# Patient Record
Sex: FEMALE | Race: WHITE | NOT HISPANIC OR LATINO | Employment: OTHER | URBAN - METROPOLITAN AREA
[De-identification: names, ages, dates, MRNs, and addresses within clinical notes are randomized per-mention and may not be internally consistent; named-entity substitution may affect disease eponyms.]

---

## 2023-03-22 ENCOUNTER — DOCUMENTATION ONLY (OUTPATIENT)
Dept: OTHER | Facility: CLINIC | Age: 73
End: 2023-03-22

## 2023-04-02 ENCOUNTER — LAB REQUISITION (OUTPATIENT)
Dept: LAB | Facility: CLINIC | Age: 73
End: 2023-04-02

## 2023-04-02 DIAGNOSIS — A15.0 TUBERCULOSIS OF LUNG: ICD-10-CM

## 2023-04-02 DIAGNOSIS — R76.12 NONSPECIFIC REACTION TO CELL MEDIATED IMMUNITY MEASUREMENT OF GAMMA INTERFERON ANTIGEN RESPONSE WITHOUT ACTIVE TUBERCULOSIS: ICD-10-CM

## 2023-04-03 PROCEDURE — 86481 TB AG RESPONSE T-CELL SUSP: CPT | Performed by: FAMILY MEDICINE

## 2023-04-04 ENCOUNTER — LAB REQUISITION (OUTPATIENT)
Dept: LAB | Facility: CLINIC | Age: 73
End: 2023-04-04

## 2023-04-04 DIAGNOSIS — I50.9 HEART FAILURE, UNSPECIFIED (H): ICD-10-CM

## 2023-04-04 LAB
GAMMA INTERFERON BACKGROUND BLD IA-ACNC: 0.03 IU/ML
M TB IFN-G BLD-IMP: NEGATIVE
M TB IFN-G CD4+ BCKGRND COR BLD-ACNC: 0.82 IU/ML
MITOGEN IGNF BCKGRD COR BLD-ACNC: 0.01 IU/ML
MITOGEN IGNF BCKGRD COR BLD-ACNC: 0.02 IU/ML
QUANTIFERON MITOGEN: 0.85 IU/ML
QUANTIFERON NIL TUBE: 0.03 IU/ML
QUANTIFERON TB1 TUBE: 0.04 IU/ML
QUANTIFERON TB2 TUBE: 0.05

## 2023-04-05 LAB
ANION GAP SERPL CALCULATED.3IONS-SCNC: 16 MMOL/L (ref 7–15)
BUN SERPL-MCNC: 26.4 MG/DL (ref 8–23)
CALCIUM SERPL-MCNC: 8.6 MG/DL (ref 8.8–10.2)
CHLORIDE SERPL-SCNC: 110 MMOL/L (ref 98–107)
CREAT SERPL-MCNC: 0.74 MG/DL (ref 0.51–0.95)
DEPRECATED HCO3 PLAS-SCNC: 18 MMOL/L (ref 22–29)
GFR SERPL CREATININE-BSD FRML MDRD: 84 ML/MIN/1.73M2
GLUCOSE SERPL-MCNC: 72 MG/DL (ref 70–99)
POTASSIUM SERPL-SCNC: 4 MMOL/L (ref 3.4–5.3)
SODIUM SERPL-SCNC: 144 MMOL/L (ref 136–145)

## 2023-04-05 PROCEDURE — 36415 COLL VENOUS BLD VENIPUNCTURE: CPT | Performed by: FAMILY MEDICINE

## 2023-04-05 PROCEDURE — P9603 ONE-WAY ALLOW PRORATED MILES: HCPCS | Performed by: FAMILY MEDICINE

## 2023-04-05 PROCEDURE — 80048 BASIC METABOLIC PNL TOTAL CA: CPT | Performed by: FAMILY MEDICINE

## 2023-06-22 ENCOUNTER — LAB REQUISITION (OUTPATIENT)
Dept: LAB | Facility: CLINIC | Age: 73
End: 2023-06-22
Payer: COMMERCIAL

## 2023-06-22 DIAGNOSIS — R60.9 EDEMA, UNSPECIFIED: ICD-10-CM

## 2023-06-23 LAB
ANION GAP SERPL CALCULATED.3IONS-SCNC: 13 MMOL/L (ref 7–15)
BUN SERPL-MCNC: 14.2 MG/DL (ref 8–23)
CALCIUM SERPL-MCNC: 8.1 MG/DL (ref 8.8–10.2)
CHLORIDE SERPL-SCNC: 106 MMOL/L (ref 98–107)
CREAT SERPL-MCNC: 0.8 MG/DL (ref 0.51–0.95)
DEPRECATED HCO3 PLAS-SCNC: 26 MMOL/L (ref 22–29)
GFR SERPL CREATININE-BSD FRML MDRD: 76 ML/MIN/1.73M2
GLUCOSE SERPL-MCNC: 76 MG/DL (ref 70–99)
POTASSIUM SERPL-SCNC: 3.7 MMOL/L (ref 3.4–5.3)
SODIUM SERPL-SCNC: 145 MMOL/L (ref 136–145)

## 2023-06-23 PROCEDURE — P9603 ONE-WAY ALLOW PRORATED MILES: HCPCS | Mod: ORL | Performed by: FAMILY MEDICINE

## 2023-06-23 PROCEDURE — 36415 COLL VENOUS BLD VENIPUNCTURE: CPT | Mod: ORL | Performed by: FAMILY MEDICINE

## 2023-06-23 PROCEDURE — 80048 BASIC METABOLIC PNL TOTAL CA: CPT | Mod: ORL | Performed by: FAMILY MEDICINE

## 2023-06-25 ENCOUNTER — LAB REQUISITION (OUTPATIENT)
Dept: LAB | Facility: CLINIC | Age: 73
End: 2023-06-25
Payer: COMMERCIAL

## 2023-06-25 DIAGNOSIS — R60.9 EDEMA, UNSPECIFIED: ICD-10-CM

## 2023-06-26 LAB
ANION GAP SERPL CALCULATED.3IONS-SCNC: 11 MMOL/L (ref 7–15)
BUN SERPL-MCNC: 14.3 MG/DL (ref 8–23)
CALCIUM SERPL-MCNC: 9 MG/DL (ref 8.8–10.2)
CHLORIDE SERPL-SCNC: 104 MMOL/L (ref 98–107)
CREAT SERPL-MCNC: 0.85 MG/DL (ref 0.51–0.95)
DEPRECATED HCO3 PLAS-SCNC: 26 MMOL/L (ref 22–29)
GFR SERPL CREATININE-BSD FRML MDRD: 71 ML/MIN/1.73M2
GLUCOSE SERPL-MCNC: 78 MG/DL (ref 70–99)
POTASSIUM SERPL-SCNC: 3.6 MMOL/L (ref 3.4–5.3)
SODIUM SERPL-SCNC: 141 MMOL/L (ref 136–145)

## 2023-06-26 PROCEDURE — P9603 ONE-WAY ALLOW PRORATED MILES: HCPCS | Mod: ORL | Performed by: FAMILY MEDICINE

## 2023-06-26 PROCEDURE — 36415 COLL VENOUS BLD VENIPUNCTURE: CPT | Mod: ORL | Performed by: FAMILY MEDICINE

## 2023-06-26 PROCEDURE — 80048 BASIC METABOLIC PNL TOTAL CA: CPT | Mod: ORL | Performed by: FAMILY MEDICINE

## 2023-11-09 ENCOUNTER — LAB REQUISITION (OUTPATIENT)
Dept: LAB | Facility: CLINIC | Age: 73
End: 2023-11-09
Payer: COMMERCIAL

## 2023-11-09 DIAGNOSIS — I50.9 HEART FAILURE, UNSPECIFIED (H): ICD-10-CM

## 2023-11-10 LAB
ANION GAP SERPL CALCULATED.3IONS-SCNC: 12 MMOL/L (ref 7–15)
BUN SERPL-MCNC: 18.4 MG/DL (ref 8–23)
CALCIUM SERPL-MCNC: 9.2 MG/DL (ref 8.8–10.2)
CHLORIDE SERPL-SCNC: 97 MMOL/L (ref 98–107)
CREAT SERPL-MCNC: 1.13 MG/DL (ref 0.51–0.95)
DEPRECATED HCO3 PLAS-SCNC: 32 MMOL/L (ref 22–29)
EGFRCR SERPLBLD CKD-EPI 2021: 50 ML/MIN/1.73M2
ERYTHROCYTE [DISTWIDTH] IN BLOOD BY AUTOMATED COUNT: 15.7 % (ref 10–15)
GLUCOSE SERPL-MCNC: 102 MG/DL (ref 70–99)
HCT VFR BLD AUTO: 33.2 % (ref 35–47)
HGB BLD-MCNC: 9.7 G/DL (ref 11.7–15.7)
MCH RBC QN AUTO: 26.9 PG (ref 26.5–33)
MCHC RBC AUTO-ENTMCNC: 29.2 G/DL (ref 31.5–36.5)
MCV RBC AUTO: 92 FL (ref 78–100)
PLATELET # BLD AUTO: 225 10E3/UL (ref 150–450)
POTASSIUM SERPL-SCNC: 3.3 MMOL/L (ref 3.4–5.3)
RBC # BLD AUTO: 3.61 10E6/UL (ref 3.8–5.2)
SODIUM SERPL-SCNC: 141 MMOL/L (ref 135–145)
WBC # BLD AUTO: 6.5 10E3/UL (ref 4–11)

## 2023-11-10 PROCEDURE — 80048 BASIC METABOLIC PNL TOTAL CA: CPT | Mod: ORL | Performed by: FAMILY MEDICINE

## 2023-11-10 PROCEDURE — P9603 ONE-WAY ALLOW PRORATED MILES: HCPCS | Mod: ORL | Performed by: FAMILY MEDICINE

## 2023-11-10 PROCEDURE — 36415 COLL VENOUS BLD VENIPUNCTURE: CPT | Mod: ORL | Performed by: FAMILY MEDICINE

## 2023-11-10 PROCEDURE — 85027 COMPLETE CBC AUTOMATED: CPT | Mod: ORL | Performed by: FAMILY MEDICINE

## 2024-04-08 ENCOUNTER — LAB REQUISITION (OUTPATIENT)
Dept: LAB | Facility: CLINIC | Age: 74
End: 2024-04-08

## 2024-04-08 DIAGNOSIS — I50.9 HEART FAILURE, UNSPECIFIED (H): ICD-10-CM

## 2024-05-07 ENCOUNTER — LAB REQUISITION (OUTPATIENT)
Dept: LAB | Facility: CLINIC | Age: 74
End: 2024-05-07
Payer: COMMERCIAL

## 2024-05-07 DIAGNOSIS — I50.9 HEART FAILURE, UNSPECIFIED (H): ICD-10-CM

## 2024-05-07 DIAGNOSIS — D64.9 ANEMIA, UNSPECIFIED: ICD-10-CM

## 2024-05-08 ENCOUNTER — HOSPITAL ENCOUNTER (INPATIENT)
Facility: CLINIC | Age: 74
LOS: 9 days | Discharge: NURSING, REHAB OR LONG-TERM CARE | DRG: 314 | End: 2024-05-18
Attending: FAMILY MEDICINE | Admitting: FAMILY MEDICINE
Payer: COMMERCIAL

## 2024-05-08 DIAGNOSIS — N17.9 AKI (ACUTE KIDNEY INJURY) (H): ICD-10-CM

## 2024-05-08 DIAGNOSIS — R65.20 SEVERE SEPSIS (H): ICD-10-CM

## 2024-05-08 DIAGNOSIS — I48.91 ATRIAL FIBRILLATION WITH RVR (H): ICD-10-CM

## 2024-05-08 DIAGNOSIS — A41.9 SEVERE SEPSIS (H): ICD-10-CM

## 2024-05-08 DIAGNOSIS — J44.9 CHRONIC OBSTRUCTIVE PULMONARY DISEASE, UNSPECIFIED COPD TYPE (H): ICD-10-CM

## 2024-05-08 DIAGNOSIS — E87.5 HYPERKALEMIA: ICD-10-CM

## 2024-05-08 DIAGNOSIS — N18.31 ACUTE RENAL FAILURE SUPERIMPOSED ON STAGE 3A CHRONIC KIDNEY DISEASE, UNSPECIFIED ACUTE RENAL FAILURE TYPE (H): ICD-10-CM

## 2024-05-08 DIAGNOSIS — I27.20 PULMONARY HYPERTENSION (H): ICD-10-CM

## 2024-05-08 DIAGNOSIS — N17.9 ACUTE RENAL FAILURE SUPERIMPOSED ON STAGE 3A CHRONIC KIDNEY DISEASE, UNSPECIFIED ACUTE RENAL FAILURE TYPE (H): ICD-10-CM

## 2024-05-08 DIAGNOSIS — M79.2 NEUROPATHIC PAIN: Primary | ICD-10-CM

## 2024-05-08 DIAGNOSIS — N39.0 ACUTE UTI: ICD-10-CM

## 2024-05-08 DIAGNOSIS — E86.0 DEHYDRATION: ICD-10-CM

## 2024-05-08 DIAGNOSIS — I50.812 CHRONIC RIGHT HEART FAILURE (H): ICD-10-CM

## 2024-05-08 LAB
ANION GAP SERPL CALCULATED.3IONS-SCNC: 21 MMOL/L (ref 7–15)
BASE EXCESS BLDV CALC-SCNC: -8.1 MMOL/L (ref -3–3)
BASOPHILS # BLD AUTO: 0 10E3/UL (ref 0–0.2)
BASOPHILS NFR BLD AUTO: 0 %
BUN SERPL-MCNC: 113 MG/DL (ref 8–23)
CALCIUM SERPL-MCNC: 9.7 MG/DL (ref 8.8–10.2)
CHLORIDE SERPL-SCNC: 97 MMOL/L (ref 98–107)
CREAT SERPL-MCNC: 3.12 MG/DL (ref 0.51–0.95)
DEPRECATED HCO3 PLAS-SCNC: 15 MMOL/L (ref 22–29)
EGFRCR SERPLBLD CKD-EPI 2021: 15 ML/MIN/1.73M2
EOSINOPHIL # BLD AUTO: 0 10E3/UL (ref 0–0.7)
EOSINOPHIL NFR BLD AUTO: 0 %
ERYTHROCYTE [DISTWIDTH] IN BLOOD BY AUTOMATED COUNT: 14.4 % (ref 10–15)
ERYTHROCYTE [DISTWIDTH] IN BLOOD BY AUTOMATED COUNT: 14.5 % (ref 10–15)
GLUCOSE SERPL-MCNC: 64 MG/DL (ref 70–99)
HCO3 BLDV-SCNC: 17 MMOL/L (ref 21–28)
HCT VFR BLD AUTO: 44 % (ref 35–47)
HCT VFR BLD AUTO: 46.9 % (ref 35–47)
HGB BLD-MCNC: 14.4 G/DL (ref 11.7–15.7)
HGB BLD-MCNC: 15.4 G/DL (ref 11.7–15.7)
IMM GRANULOCYTES # BLD: 0.1 10E3/UL
IMM GRANULOCYTES NFR BLD: 1 %
LACTATE SERPL-SCNC: 1.4 MMOL/L (ref 0.7–2)
LYMPHOCYTES # BLD AUTO: 2.4 10E3/UL (ref 0.8–5.3)
LYMPHOCYTES NFR BLD AUTO: 17 %
MCH RBC QN AUTO: 29.8 PG (ref 26.5–33)
MCH RBC QN AUTO: 29.9 PG (ref 26.5–33)
MCHC RBC AUTO-ENTMCNC: 32.7 G/DL (ref 31.5–36.5)
MCHC RBC AUTO-ENTMCNC: 32.8 G/DL (ref 31.5–36.5)
MCV RBC AUTO: 91 FL (ref 78–100)
MCV RBC AUTO: 91 FL (ref 78–100)
MONOCYTES # BLD AUTO: 0.9 10E3/UL (ref 0–1.3)
MONOCYTES NFR BLD AUTO: 6 %
NEUTROPHILS # BLD AUTO: 10.6 10E3/UL (ref 1.6–8.3)
NEUTROPHILS NFR BLD AUTO: 76 %
NRBC # BLD AUTO: 0 10E3/UL
NRBC BLD AUTO-RTO: 0 /100
O2/TOTAL GAS SETTING VFR VENT: 21 %
OXYHGB MFR BLDV: 47 % (ref 70–75)
PCO2 BLDV: 32 MM HG (ref 40–50)
PH BLDV: 7.33 [PH] (ref 7.32–7.43)
PLATELET # BLD AUTO: 288 10E3/UL (ref 150–450)
PLATELET # BLD AUTO: 301 10E3/UL (ref 150–450)
PO2 BLDV: 29 MM HG (ref 25–47)
POTASSIUM SERPL-SCNC: 5.5 MMOL/L (ref 3.4–5.3)
RBC # BLD AUTO: 4.82 10E6/UL (ref 3.8–5.2)
RBC # BLD AUTO: 5.17 10E6/UL (ref 3.8–5.2)
SAO2 % BLDV: 47.3 % (ref 70–75)
SODIUM SERPL-SCNC: 133 MMOL/L (ref 135–145)
WBC # BLD AUTO: 13.3 10E3/UL (ref 4–11)
WBC # BLD AUTO: 14 10E3/UL (ref 4–11)

## 2024-05-08 PROCEDURE — 82805 BLOOD GASES W/O2 SATURATION: CPT | Performed by: FAMILY MEDICINE

## 2024-05-08 PROCEDURE — P9603 ONE-WAY ALLOW PRORATED MILES: HCPCS | Mod: ORL | Performed by: FAMILY MEDICINE

## 2024-05-08 PROCEDURE — 36415 COLL VENOUS BLD VENIPUNCTURE: CPT | Performed by: FAMILY MEDICINE

## 2024-05-08 PROCEDURE — 83880 ASSAY OF NATRIURETIC PEPTIDE: CPT | Performed by: FAMILY MEDICINE

## 2024-05-08 PROCEDURE — 84155 ASSAY OF PROTEIN SERUM: CPT | Performed by: FAMILY MEDICINE

## 2024-05-08 PROCEDURE — 85652 RBC SED RATE AUTOMATED: CPT | Performed by: FAMILY MEDICINE

## 2024-05-08 PROCEDURE — 99291 CRITICAL CARE FIRST HOUR: CPT | Mod: 25

## 2024-05-08 PROCEDURE — 84443 ASSAY THYROID STIM HORMONE: CPT | Performed by: FAMILY MEDICINE

## 2024-05-08 PROCEDURE — 36415 COLL VENOUS BLD VENIPUNCTURE: CPT | Mod: ORL | Performed by: FAMILY MEDICINE

## 2024-05-08 PROCEDURE — 82550 ASSAY OF CK (CPK): CPT | Performed by: FAMILY MEDICINE

## 2024-05-08 PROCEDURE — 85025 COMPLETE CBC W/AUTO DIFF WBC: CPT | Performed by: FAMILY MEDICINE

## 2024-05-08 PROCEDURE — 93005 ELECTROCARDIOGRAM TRACING: CPT

## 2024-05-08 PROCEDURE — 87040 BLOOD CULTURE FOR BACTERIA: CPT | Performed by: FAMILY MEDICINE

## 2024-05-08 PROCEDURE — 80048 BASIC METABOLIC PNL TOTAL CA: CPT | Mod: ORL | Performed by: FAMILY MEDICINE

## 2024-05-08 PROCEDURE — 99292 CRITICAL CARE ADDL 30 MIN: CPT | Performed by: FAMILY MEDICINE

## 2024-05-08 PROCEDURE — 86140 C-REACTIVE PROTEIN: CPT | Performed by: FAMILY MEDICINE

## 2024-05-08 PROCEDURE — 84460 ALANINE AMINO (ALT) (SGPT): CPT | Performed by: FAMILY MEDICINE

## 2024-05-08 PROCEDURE — 85027 COMPLETE CBC AUTOMATED: CPT | Mod: ORL | Performed by: FAMILY MEDICINE

## 2024-05-08 PROCEDURE — 99291 CRITICAL CARE FIRST HOUR: CPT | Mod: 25 | Performed by: FAMILY MEDICINE

## 2024-05-08 PROCEDURE — 93010 ELECTROCARDIOGRAM REPORT: CPT | Performed by: FAMILY MEDICINE

## 2024-05-08 PROCEDURE — 84484 ASSAY OF TROPONIN QUANT: CPT | Performed by: FAMILY MEDICINE

## 2024-05-08 PROCEDURE — 83605 ASSAY OF LACTIC ACID: CPT | Performed by: FAMILY MEDICINE

## 2024-05-08 PROCEDURE — 84145 PROCALCITONIN (PCT): CPT | Performed by: FAMILY MEDICINE

## 2024-05-08 RX ORDER — ATORVASTATIN CALCIUM 40 MG/1
40 TABLET, FILM COATED ORAL DAILY
COMMUNITY

## 2024-05-08 RX ORDER — FLUTICASONE PROPIONATE 50 MCG
1 SPRAY, SUSPENSION (ML) NASAL DAILY
COMMUNITY

## 2024-05-08 RX ORDER — TRAMADOL HYDROCHLORIDE 50 MG/1
50 TABLET ORAL EVERY 8 HOURS PRN
Status: ON HOLD | COMMUNITY
End: 2024-05-18

## 2024-05-08 RX ORDER — ALBUTEROL SULFATE 0.83 MG/ML
2.5 SOLUTION RESPIRATORY (INHALATION) EVERY 6 HOURS PRN
COMMUNITY

## 2024-05-08 RX ORDER — CETIRIZINE HYDROCHLORIDE 10 MG/1
10 TABLET ORAL DAILY
Status: ON HOLD | COMMUNITY
End: 2024-05-17

## 2024-05-08 RX ORDER — ACETAMINOPHEN 500 MG
500-1000 TABLET ORAL 2 TIMES DAILY
Status: ON HOLD | COMMUNITY
End: 2024-05-17

## 2024-05-08 RX ORDER — POTASSIUM CHLORIDE 1500 MG/1
20 TABLET, EXTENDED RELEASE ORAL 2 TIMES DAILY
Status: ON HOLD | COMMUNITY
End: 2024-05-17

## 2024-05-08 RX ORDER — SPIRONOLACTONE 25 MG/1
25 TABLET ORAL DAILY
COMMUNITY

## 2024-05-08 RX ORDER — ASPIRIN 81 MG/1
81 TABLET ORAL DAILY
COMMUNITY

## 2024-05-08 RX ORDER — PREDNISONE 5 MG/1
5 TABLET ORAL DAILY
COMMUNITY

## 2024-05-08 RX ORDER — METHYL SALICYLATE
OIL (ML) MISCELLANEOUS PRN
Status: ON HOLD | COMMUNITY
End: 2024-05-17

## 2024-05-08 RX ORDER — METOPROLOL SUCCINATE 25 MG/1
75 TABLET, EXTENDED RELEASE ORAL DAILY
Status: ON HOLD | COMMUNITY
End: 2024-05-17

## 2024-05-08 RX ORDER — BUMETANIDE 1 MG/1
2 TABLET ORAL DAILY
Status: ON HOLD | COMMUNITY
End: 2024-05-17

## 2024-05-08 ASSESSMENT — COLUMBIA-SUICIDE SEVERITY RATING SCALE - C-SSRS
1. IN THE PAST MONTH, HAVE YOU WISHED YOU WERE DEAD OR WISHED YOU COULD GO TO SLEEP AND NOT WAKE UP?: NO
6. HAVE YOU EVER DONE ANYTHING, STARTED TO DO ANYTHING, OR PREPARED TO DO ANYTHING TO END YOUR LIFE?: NO
2. HAVE YOU ACTUALLY HAD ANY THOUGHTS OF KILLING YOURSELF IN THE PAST MONTH?: NO

## 2024-05-08 ASSESSMENT — ACTIVITIES OF DAILY LIVING (ADL): ADLS_ACUITY_SCORE: 35

## 2024-05-08 NOTE — LETTER
Transition Communication Hand-off for Care Transitions to Next Level of Care Provider    Name: Jayashree Carson  : 1950  MRN #: [id number]  Primary Care Provider: [facility]     [facility] [address] [facility] [address]     Reason for Hospitalization:  Dehydration [E86.0]  Hyperkalemia [E87.5]  Acute UTI [N39.0]  NIC (acute kidney injury) (H24) [N17.9]  Severe sepsis (H) [A41.9, R65.20]  Acute renal failure superimposed on stage 3a chronic kidney disease, unspecified acute renal failure type (H) [N17.9, N18.31]  Admit Date/Time: 2024 10:36 PM  Discharge Date: 24  Payor Source: Payor: BLUE PLUS / Plan: BLUE PLUS ADVANTAGE DUAL MSHO / Product Type: Medicare /     Readmission Assessment Measure (HANY) Risk Score/category: High             Reason for Communication Hand-off Referral: Fragility    Discharge Plan:  Discharge Plan:      Flowsheet Row Most Recent Value   Disposition Comments Return to LTC [facility]             Concern for non-adherence with plan of care:   Y/N no    Discharge Needs Assessment:  Needs      Flowsheet Row Most Recent Value   Equipment Currently Used at Home walker, rolling, wheelchair, manual   # of Referrals Placed by CM External Care Coordination            Already enrolled in Tele-monitoring program and name of program:  no  Follow-up specialty is recommended: No    Follow-up plan:  No future appointments.    Any outstanding tests or procedures:              Key Recommendations:  Patient will return to [facility] Jamshid Howard RN    AVS/Discharge Summary is the source of truth; this is a helpful guide for improved communication of patient story

## 2024-05-09 ENCOUNTER — APPOINTMENT (OUTPATIENT)
Dept: ULTRASOUND IMAGING | Facility: CLINIC | Age: 74
DRG: 314 | End: 2024-05-09
Attending: FAMILY MEDICINE
Payer: COMMERCIAL

## 2024-05-09 ENCOUNTER — APPOINTMENT (OUTPATIENT)
Dept: GENERAL RADIOLOGY | Facility: CLINIC | Age: 74
DRG: 314 | End: 2024-05-09
Attending: FAMILY MEDICINE
Payer: COMMERCIAL

## 2024-05-09 ENCOUNTER — APPOINTMENT (OUTPATIENT)
Dept: CT IMAGING | Facility: CLINIC | Age: 74
DRG: 314 | End: 2024-05-09
Attending: FAMILY MEDICINE
Payer: COMMERCIAL

## 2024-05-09 ENCOUNTER — LAB REQUISITION (OUTPATIENT)
Dept: LAB | Facility: CLINIC | Age: 74
End: 2024-05-09
Payer: COMMERCIAL

## 2024-05-09 DIAGNOSIS — D72.829 ELEVATED WHITE BLOOD CELL COUNT, UNSPECIFIED: ICD-10-CM

## 2024-05-09 PROBLEM — N39.0 ACUTE UTI: Status: ACTIVE | Noted: 2024-05-09

## 2024-05-09 PROBLEM — E87.29 INCREASED ANION GAP METABOLIC ACIDOSIS: Status: ACTIVE | Noted: 2024-05-09

## 2024-05-09 PROBLEM — R65.10 SIRS (SYSTEMIC INFLAMMATORY RESPONSE SYNDROME) (H): Status: ACTIVE | Noted: 2024-05-09

## 2024-05-09 PROBLEM — R82.71 ASYMPTOMATIC BACTERIURIA: Status: ACTIVE | Noted: 2024-05-09

## 2024-05-09 PROBLEM — E87.5 HYPERKALEMIA: Status: ACTIVE | Noted: 2024-05-09

## 2024-05-09 PROBLEM — N18.31 ACUTE RENAL FAILURE SUPERIMPOSED ON STAGE 3A CHRONIC KIDNEY DISEASE, UNSPECIFIED ACUTE RENAL FAILURE TYPE (H): Status: ACTIVE | Noted: 2024-05-09

## 2024-05-09 PROBLEM — E86.0 DEHYDRATION: Status: ACTIVE | Noted: 2024-05-09

## 2024-05-09 PROBLEM — I48.91 ATRIAL FIBRILLATION WITH RVR (H): Status: ACTIVE | Noted: 2024-05-09

## 2024-05-09 PROBLEM — R65.20 SEVERE SEPSIS (H): Status: ACTIVE | Noted: 2024-05-09

## 2024-05-09 PROBLEM — N17.9 ACUTE RENAL FAILURE SUPERIMPOSED ON STAGE 3A CHRONIC KIDNEY DISEASE, UNSPECIFIED ACUTE RENAL FAILURE TYPE (H): Status: ACTIVE | Noted: 2024-05-09

## 2024-05-09 PROBLEM — I95.9 ARTERIAL HYPOTENSION: Status: ACTIVE | Noted: 2024-05-09

## 2024-05-09 PROBLEM — N17.9 AKI (ACUTE KIDNEY INJURY) (H): Status: ACTIVE | Noted: 2024-05-09

## 2024-05-09 PROBLEM — A41.9 SEVERE SEPSIS (H): Status: ACTIVE | Noted: 2024-05-09

## 2024-05-09 LAB
ALBUMIN SERPL BCG-MCNC: 4 G/DL (ref 3.5–5.2)
ALBUMIN UR-MCNC: 30 MG/DL
ALP SERPL-CCNC: 174 U/L (ref 40–150)
ALT SERPL W P-5'-P-CCNC: 89 U/L (ref 0–50)
ANION GAP SERPL CALCULATED.3IONS-SCNC: 12 MMOL/L (ref 7–15)
ANION GAP SERPL CALCULATED.3IONS-SCNC: 19 MMOL/L (ref 7–15)
ANION GAP SERPL CALCULATED.3IONS-SCNC: 21 MMOL/L (ref 7–15)
ANION GAP SERPL CALCULATED.3IONS-SCNC: 21 MMOL/L (ref 7–15)
APPEARANCE UR: ABNORMAL
AST SERPL W P-5'-P-CCNC: ABNORMAL U/L
BACTERIA #/AREA URNS HPF: ABNORMAL /HPF
BILIRUB SERPL-MCNC: 1.1 MG/DL
BILIRUB UR QL STRIP: NEGATIVE
BUN SERPL-MCNC: 103.6 MG/DL (ref 8–23)
BUN SERPL-MCNC: 109.8 MG/DL (ref 8–23)
BUN SERPL-MCNC: 119.3 MG/DL (ref 8–23)
BUN SERPL-MCNC: 88.3 MG/DL (ref 8–23)
CALCIUM SERPL-MCNC: 10 MG/DL (ref 8.8–10.2)
CALCIUM SERPL-MCNC: 7.9 MG/DL (ref 8.8–10.2)
CALCIUM SERPL-MCNC: 8.7 MG/DL (ref 8.8–10.2)
CALCIUM SERPL-MCNC: 8.9 MG/DL (ref 8.8–10.2)
CHLORIDE SERPL-SCNC: 105 MMOL/L (ref 98–107)
CHLORIDE SERPL-SCNC: 111 MMOL/L (ref 98–107)
CHLORIDE SERPL-SCNC: 99 MMOL/L (ref 98–107)
CHLORIDE SERPL-SCNC: 99 MMOL/L (ref 98–107)
CK SERPL-CCNC: 109 U/L (ref 26–192)
COLOR UR AUTO: YELLOW
CREAT SERPL-MCNC: 1.71 MG/DL (ref 0.51–0.95)
CREAT SERPL-MCNC: 2.3 MG/DL (ref 0.51–0.95)
CREAT SERPL-MCNC: 2.63 MG/DL (ref 0.51–0.95)
CREAT SERPL-MCNC: 2.95 MG/DL (ref 0.51–0.95)
CRP SERPL-MCNC: 25.5 MG/L
DEPRECATED HCO3 PLAS-SCNC: 12 MMOL/L (ref 22–29)
DEPRECATED HCO3 PLAS-SCNC: 15 MMOL/L (ref 22–29)
DEPRECATED HCO3 PLAS-SCNC: 15 MMOL/L (ref 22–29)
DEPRECATED HCO3 PLAS-SCNC: 16 MMOL/L (ref 22–29)
EGFRCR SERPLBLD CKD-EPI 2021: 16 ML/MIN/1.73M2
EGFRCR SERPLBLD CKD-EPI 2021: 18 ML/MIN/1.73M2
EGFRCR SERPLBLD CKD-EPI 2021: 22 ML/MIN/1.73M2
EGFRCR SERPLBLD CKD-EPI 2021: 31 ML/MIN/1.73M2
ERYTHROCYTE [DISTWIDTH] IN BLOOD BY AUTOMATED COUNT: 14.7 % (ref 10–15)
ERYTHROCYTE [DISTWIDTH] IN BLOOD BY AUTOMATED COUNT: 14.8 % (ref 10–15)
ERYTHROCYTE [SEDIMENTATION RATE] IN BLOOD BY WESTERGREN METHOD: 17 MM/HR (ref 0–30)
GLUCOSE SERPL-MCNC: 134 MG/DL (ref 70–99)
GLUCOSE SERPL-MCNC: 77 MG/DL (ref 70–99)
GLUCOSE SERPL-MCNC: 79 MG/DL (ref 70–99)
GLUCOSE SERPL-MCNC: 80 MG/DL (ref 70–99)
GLUCOSE UR STRIP-MCNC: NEGATIVE MG/DL
HCT VFR BLD AUTO: 39.9 % (ref 35–47)
HCT VFR BLD AUTO: 42.1 % (ref 35–47)
HGB BLD-MCNC: 12.4 G/DL (ref 11.7–15.7)
HGB BLD-MCNC: 13.8 G/DL (ref 11.7–15.7)
HGB UR QL STRIP: ABNORMAL
KETONES UR STRIP-MCNC: NEGATIVE MG/DL
LACTATE SERPL-SCNC: 1.2 MMOL/L (ref 0.7–2)
LEUKOCYTE ESTERASE UR QL STRIP: ABNORMAL
MCH RBC QN AUTO: 29.7 PG (ref 26.5–33)
MCH RBC QN AUTO: 29.8 PG (ref 26.5–33)
MCHC RBC AUTO-ENTMCNC: 31.1 G/DL (ref 31.5–36.5)
MCHC RBC AUTO-ENTMCNC: 32.8 G/DL (ref 31.5–36.5)
MCV RBC AUTO: 91 FL (ref 78–100)
MCV RBC AUTO: 95 FL (ref 78–100)
NITRATE UR QL: NEGATIVE
NT-PROBNP SERPL-MCNC: ABNORMAL PG/ML (ref 0–900)
PH UR STRIP: 5 [PH] (ref 5–7)
PLATELET # BLD AUTO: 193 10E3/UL (ref 150–450)
PLATELET # BLD AUTO: 245 10E3/UL (ref 150–450)
POTASSIUM SERPL-SCNC: 4.3 MMOL/L (ref 3.4–5.3)
POTASSIUM SERPL-SCNC: 5.3 MMOL/L (ref 3.4–5.3)
POTASSIUM SERPL-SCNC: 5.4 MMOL/L (ref 3.4–5.3)
POTASSIUM SERPL-SCNC: 5.8 MMOL/L (ref 3.4–5.3)
POTASSIUM SERPL-SCNC: 6.1 MMOL/L (ref 3.4–5.3)
PROCALCITONIN SERPL IA-MCNC: 0.14 NG/ML
PROCALCITONIN SERPL IA-MCNC: 0.27 NG/ML
PROT SERPL-MCNC: 7.8 G/DL (ref 6.4–8.3)
RBC # BLD AUTO: 4.18 10E6/UL (ref 3.8–5.2)
RBC # BLD AUTO: 4.63 10E6/UL (ref 3.8–5.2)
RBC URINE: 3 /HPF
SODIUM SERPL-SCNC: 135 MMOL/L (ref 135–145)
SODIUM SERPL-SCNC: 136 MMOL/L (ref 135–145)
SODIUM SERPL-SCNC: 136 MMOL/L (ref 135–145)
SODIUM SERPL-SCNC: 138 MMOL/L (ref 135–145)
SP GR UR STRIP: 1.01 (ref 1–1.03)
TROPONIN T SERPL HS-MCNC: 69 NG/L
TROPONIN T SERPL HS-MCNC: 79 NG/L
TSH SERPL DL<=0.005 MIU/L-ACNC: 2.88 UIU/ML (ref 0.3–4.2)
UROBILINOGEN UR STRIP-MCNC: NORMAL MG/DL
WBC # BLD AUTO: 11.5 10E3/UL (ref 4–11)
WBC # BLD AUTO: 9.2 10E3/UL (ref 4–11)
WBC CLUMPS #/AREA URNS HPF: PRESENT /HPF
WBC URINE: >182 /HPF

## 2024-05-09 PROCEDURE — 87086 URINE CULTURE/COLONY COUNT: CPT | Performed by: FAMILY MEDICINE

## 2024-05-09 PROCEDURE — 250N000011 HC RX IP 250 OP 636: Performed by: FAMILY MEDICINE

## 2024-05-09 PROCEDURE — 80048 BASIC METABOLIC PNL TOTAL CA: CPT | Performed by: INTERNAL MEDICINE

## 2024-05-09 PROCEDURE — 99207 PR APP CREDIT; MD BILLING SHARED VISIT: CPT | Performed by: FAMILY MEDICINE

## 2024-05-09 PROCEDURE — 258N000003 HC RX IP 258 OP 636: Performed by: FAMILY MEDICINE

## 2024-05-09 PROCEDURE — 250N000013 HC RX MED GY IP 250 OP 250 PS 637: Performed by: FAMILY MEDICINE

## 2024-05-09 PROCEDURE — 36415 COLL VENOUS BLD VENIPUNCTURE: CPT | Performed by: FAMILY MEDICINE

## 2024-05-09 PROCEDURE — 258N000003 HC RX IP 258 OP 636: Performed by: INTERNAL MEDICINE

## 2024-05-09 PROCEDURE — 250N000013 HC RX MED GY IP 250 OP 250 PS 637: Performed by: INTERNAL MEDICINE

## 2024-05-09 PROCEDURE — 85027 COMPLETE CBC AUTOMATED: CPT | Performed by: INTERNAL MEDICINE

## 2024-05-09 PROCEDURE — 99418 PROLNG IP/OBS E/M EA 15 MIN: CPT | Performed by: INTERNAL MEDICINE

## 2024-05-09 PROCEDURE — 71250 CT THORAX DX C-: CPT

## 2024-05-09 PROCEDURE — 76770 US EXAM ABDO BACK WALL COMP: CPT

## 2024-05-09 PROCEDURE — 99223 1ST HOSP IP/OBS HIGH 75: CPT | Performed by: INTERNAL MEDICINE

## 2024-05-09 PROCEDURE — 999N000065 XR CHEST PORT 1 VIEW

## 2024-05-09 PROCEDURE — 96361 HYDRATE IV INFUSION ADD-ON: CPT

## 2024-05-09 PROCEDURE — 96365 THER/PROPH/DIAG IV INF INIT: CPT

## 2024-05-09 PROCEDURE — 87186 SC STD MICRODIL/AGAR DIL: CPT | Performed by: FAMILY MEDICINE

## 2024-05-09 PROCEDURE — 84484 ASSAY OF TROPONIN QUANT: CPT | Performed by: FAMILY MEDICINE

## 2024-05-09 PROCEDURE — 70450 CT HEAD/BRAIN W/O DYE: CPT

## 2024-05-09 PROCEDURE — 80048 BASIC METABOLIC PNL TOTAL CA: CPT | Performed by: FAMILY MEDICINE

## 2024-05-09 PROCEDURE — 272N000579 HC TRAY POWER PICC SOLO 4FR SINGLE LUMEN

## 2024-05-09 PROCEDURE — 81001 URINALYSIS AUTO W/SCOPE: CPT | Performed by: FAMILY MEDICINE

## 2024-05-09 PROCEDURE — 36569 INSJ PICC 5 YR+ W/O IMAGING: CPT

## 2024-05-09 PROCEDURE — 83605 ASSAY OF LACTIC ACID: CPT | Performed by: INTERNAL MEDICINE

## 2024-05-09 PROCEDURE — 250N000012 HC RX MED GY IP 250 OP 636 PS 637: Performed by: FAMILY MEDICINE

## 2024-05-09 PROCEDURE — 84145 PROCALCITONIN (PCT): CPT | Performed by: INTERNAL MEDICINE

## 2024-05-09 PROCEDURE — 87040 BLOOD CULTURE FOR BACTERIA: CPT | Performed by: FAMILY MEDICINE

## 2024-05-09 PROCEDURE — 272N000024 HC KIT POWER PICC DOUBLE LUMEN

## 2024-05-09 PROCEDURE — 85014 HEMATOCRIT: CPT | Performed by: FAMILY MEDICINE

## 2024-05-09 PROCEDURE — 84132 ASSAY OF SERUM POTASSIUM: CPT | Performed by: INTERNAL MEDICINE

## 2024-05-09 PROCEDURE — 272N000433 HC KIT CATH IV 18 OR 20G CM, POWERGLIDE W MAX BARRIER

## 2024-05-09 PROCEDURE — 120N000013 HC R&B IMCU

## 2024-05-09 PROCEDURE — 250N000011 HC RX IP 250 OP 636: Mod: JZ | Performed by: INTERNAL MEDICINE

## 2024-05-09 RX ORDER — SPIRONOLACTONE 25 MG/1
25 TABLET ORAL DAILY
Status: DISCONTINUED | OUTPATIENT
Start: 2024-05-09 | End: 2024-05-17

## 2024-05-09 RX ORDER — POTASSIUM CHLORIDE 1500 MG/1
20 TABLET, EXTENDED RELEASE ORAL 2 TIMES DAILY
Status: DISCONTINUED | OUTPATIENT
Start: 2024-05-09 | End: 2024-05-16

## 2024-05-09 RX ORDER — CEFTRIAXONE 1 G/1
1 INJECTION, POWDER, FOR SOLUTION INTRAMUSCULAR; INTRAVENOUS EVERY 24 HOURS
Status: DISCONTINUED | OUTPATIENT
Start: 2024-05-10 | End: 2024-05-12

## 2024-05-09 RX ORDER — HEPARIN SODIUM 5000 [USP'U]/.5ML
5000 INJECTION, SOLUTION INTRAVENOUS; SUBCUTANEOUS EVERY 8 HOURS
Status: DISCONTINUED | OUTPATIENT
Start: 2024-05-09 | End: 2024-05-09

## 2024-05-09 RX ORDER — TRAMADOL HYDROCHLORIDE 50 MG/1
50 TABLET ORAL EVERY 8 HOURS PRN
Status: DISCONTINUED | OUTPATIENT
Start: 2024-05-09 | End: 2024-05-09

## 2024-05-09 RX ORDER — NALOXONE HYDROCHLORIDE 0.4 MG/ML
0.2 INJECTION, SOLUTION INTRAMUSCULAR; INTRAVENOUS; SUBCUTANEOUS
Status: DISCONTINUED | OUTPATIENT
Start: 2024-05-09 | End: 2024-05-18 | Stop reason: HOSPADM

## 2024-05-09 RX ORDER — ONDANSETRON 2 MG/ML
4 INJECTION INTRAMUSCULAR; INTRAVENOUS EVERY 6 HOURS PRN
Status: DISCONTINUED | OUTPATIENT
Start: 2024-05-09 | End: 2024-05-18 | Stop reason: HOSPADM

## 2024-05-09 RX ORDER — ATORVASTATIN CALCIUM 40 MG/1
40 TABLET, FILM COATED ORAL DAILY
Status: DISCONTINUED | OUTPATIENT
Start: 2024-05-09 | End: 2024-05-18 | Stop reason: HOSPADM

## 2024-05-09 RX ORDER — ACETAMINOPHEN 325 MG/1
650 TABLET ORAL ONCE
Status: COMPLETED | OUTPATIENT
Start: 2024-05-09 | End: 2024-05-09

## 2024-05-09 RX ORDER — ACETAMINOPHEN 500 MG
1000 TABLET ORAL 3 TIMES DAILY
Status: DISCONTINUED | OUTPATIENT
Start: 2024-05-09 | End: 2024-05-18 | Stop reason: HOSPADM

## 2024-05-09 RX ORDER — CYCLOBENZAPRINE HCL 5 MG
5 TABLET ORAL 3 TIMES DAILY PRN
Status: DISCONTINUED | OUTPATIENT
Start: 2024-05-09 | End: 2024-05-11

## 2024-05-09 RX ORDER — ALBUTEROL SULFATE 0.83 MG/ML
2.5 SOLUTION RESPIRATORY (INHALATION) EVERY 6 HOURS PRN
Status: DISCONTINUED | OUTPATIENT
Start: 2024-05-09 | End: 2024-05-18 | Stop reason: HOSPADM

## 2024-05-09 RX ORDER — CEFTRIAXONE 2 G/1
2 INJECTION, POWDER, FOR SOLUTION INTRAMUSCULAR; INTRAVENOUS ONCE
Status: COMPLETED | OUTPATIENT
Start: 2024-05-09 | End: 2024-05-09

## 2024-05-09 RX ORDER — HEPARIN SODIUM,PORCINE 10 UNIT/ML
5-10 VIAL (ML) INTRAVENOUS
Status: DISCONTINUED | OUTPATIENT
Start: 2024-05-09 | End: 2024-05-12

## 2024-05-09 RX ORDER — HEPARIN SODIUM (PORCINE) LOCK FLUSH IV SOLN 100 UNIT/ML 100 UNIT/ML
5-10 SOLUTION INTRAVENOUS
Status: DISCONTINUED | OUTPATIENT
Start: 2024-05-09 | End: 2024-05-12

## 2024-05-09 RX ORDER — LIDOCAINE 40 MG/G
CREAM TOPICAL
Status: DISCONTINUED | OUTPATIENT
Start: 2024-05-09 | End: 2024-05-17

## 2024-05-09 RX ORDER — ONDANSETRON 4 MG/1
4 TABLET, ORALLY DISINTEGRATING ORAL EVERY 6 HOURS PRN
Status: DISCONTINUED | OUTPATIENT
Start: 2024-05-09 | End: 2024-05-18 | Stop reason: HOSPADM

## 2024-05-09 RX ORDER — NALOXONE HYDROCHLORIDE 0.4 MG/ML
0.4 INJECTION, SOLUTION INTRAMUSCULAR; INTRAVENOUS; SUBCUTANEOUS
Status: DISCONTINUED | OUTPATIENT
Start: 2024-05-09 | End: 2024-05-18 | Stop reason: HOSPADM

## 2024-05-09 RX ORDER — OXYCODONE HYDROCHLORIDE 5 MG/1
5 TABLET ORAL EVERY 4 HOURS PRN
Status: DISCONTINUED | OUTPATIENT
Start: 2024-05-09 | End: 2024-05-09

## 2024-05-09 RX ORDER — AMOXICILLIN 250 MG
2 CAPSULE ORAL 2 TIMES DAILY PRN
Status: DISCONTINUED | OUTPATIENT
Start: 2024-05-09 | End: 2024-05-18 | Stop reason: HOSPADM

## 2024-05-09 RX ORDER — ACETAMINOPHEN 500 MG
500-1000 TABLET ORAL 2 TIMES DAILY
Status: DISCONTINUED | OUTPATIENT
Start: 2024-05-09 | End: 2024-05-09

## 2024-05-09 RX ORDER — DIGOXIN 0.25 MG/ML
125 INJECTION INTRAMUSCULAR; INTRAVENOUS ONCE
Status: COMPLETED | OUTPATIENT
Start: 2024-05-09 | End: 2024-05-09

## 2024-05-09 RX ORDER — MULTIPLE VITAMINS W/ MINERALS TAB 9MG-400MCG
1 TAB ORAL DAILY
Status: DISCONTINUED | OUTPATIENT
Start: 2024-05-09 | End: 2024-05-18 | Stop reason: HOSPADM

## 2024-05-09 RX ORDER — AMOXICILLIN 250 MG
1 CAPSULE ORAL 2 TIMES DAILY PRN
Status: DISCONTINUED | OUTPATIENT
Start: 2024-05-09 | End: 2024-05-09

## 2024-05-09 RX ORDER — HEPARIN SODIUM 5000 [USP'U]/.5ML
5000 INJECTION, SOLUTION INTRAVENOUS; SUBCUTANEOUS EVERY 8 HOURS
Status: DISCONTINUED | OUTPATIENT
Start: 2024-05-09 | End: 2024-05-11

## 2024-05-09 RX ORDER — BUMETANIDE 2 MG/1
2 TABLET ORAL DAILY
Status: DISCONTINUED | OUTPATIENT
Start: 2024-05-09 | End: 2024-05-16

## 2024-05-09 RX ORDER — HEPARIN SODIUM,PORCINE 10 UNIT/ML
5-10 VIAL (ML) INTRAVENOUS EVERY 24 HOURS
Status: DISCONTINUED | OUTPATIENT
Start: 2024-05-09 | End: 2024-05-12

## 2024-05-09 RX ORDER — SODIUM POLYSTYRENE SULFONATE 15 G/60ML
15 SUSPENSION ORAL; RECTAL ONCE
Status: COMPLETED | OUTPATIENT
Start: 2024-05-09 | End: 2024-05-09

## 2024-05-09 RX ORDER — METOPROLOL SUCCINATE 25 MG/1
25 TABLET, EXTENDED RELEASE ORAL ONCE
Status: DISCONTINUED | OUTPATIENT
Start: 2024-05-09 | End: 2024-05-09

## 2024-05-09 RX ORDER — SODIUM CHLORIDE 9 MG/ML
INJECTION, SOLUTION INTRAVENOUS CONTINUOUS
Status: DISCONTINUED | OUTPATIENT
Start: 2024-05-09 | End: 2024-05-12

## 2024-05-09 RX ORDER — FLUTICASONE PROPIONATE 50 MCG
1 SPRAY, SUSPENSION (ML) NASAL DAILY
Status: DISCONTINUED | OUTPATIENT
Start: 2024-05-09 | End: 2024-05-18 | Stop reason: HOSPADM

## 2024-05-09 RX ORDER — AMOXICILLIN 250 MG
1 CAPSULE ORAL 2 TIMES DAILY PRN
Status: DISCONTINUED | OUTPATIENT
Start: 2024-05-09 | End: 2024-05-18 | Stop reason: HOSPADM

## 2024-05-09 RX ORDER — ACETAMINOPHEN 500 MG
1000 TABLET ORAL 2 TIMES DAILY
Status: DISCONTINUED | OUTPATIENT
Start: 2024-05-09 | End: 2024-05-09

## 2024-05-09 RX ORDER — CETIRIZINE HYDROCHLORIDE 10 MG/1
10 TABLET ORAL DAILY
Status: DISCONTINUED | OUTPATIENT
Start: 2024-05-09 | End: 2024-05-16

## 2024-05-09 RX ORDER — AMOXICILLIN 250 MG
2 CAPSULE ORAL 2 TIMES DAILY PRN
Status: DISCONTINUED | OUTPATIENT
Start: 2024-05-09 | End: 2024-05-09

## 2024-05-09 RX ORDER — ASPIRIN 81 MG/1
81 TABLET ORAL DAILY
Status: DISCONTINUED | OUTPATIENT
Start: 2024-05-09 | End: 2024-05-18 | Stop reason: HOSPADM

## 2024-05-09 RX ORDER — PREDNISONE 5 MG/1
5 TABLET ORAL DAILY
Status: DISCONTINUED | OUTPATIENT
Start: 2024-05-09 | End: 2024-05-18 | Stop reason: HOSPADM

## 2024-05-09 RX ORDER — METOPROLOL TARTRATE 1 MG/ML
5 INJECTION, SOLUTION INTRAVENOUS EVERY 6 HOURS PRN
Status: DISCONTINUED | OUTPATIENT
Start: 2024-05-09 | End: 2024-05-12

## 2024-05-09 RX ORDER — LIDOCAINE 40 MG/G
CREAM TOPICAL
Status: DISCONTINUED | OUTPATIENT
Start: 2024-05-09 | End: 2024-05-12

## 2024-05-09 RX ORDER — ACETAMINOPHEN 325 MG/1
325-650 TABLET ORAL EVERY 6 HOURS PRN
Status: ON HOLD | COMMUNITY
Start: 2024-01-21 | End: 2024-05-17

## 2024-05-09 RX ORDER — CALCIUM CARBONATE 500 MG/1
1000 TABLET, CHEWABLE ORAL 4 TIMES DAILY PRN
Status: DISCONTINUED | OUTPATIENT
Start: 2024-05-09 | End: 2024-05-18 | Stop reason: HOSPADM

## 2024-05-09 RX ADMIN — SODIUM CHLORIDE 500 ML: 9 INJECTION, SOLUTION INTRAVENOUS at 09:58

## 2024-05-09 RX ADMIN — HEPARIN SODIUM 5000 UNITS: 10000 INJECTION, SOLUTION INTRAVENOUS; SUBCUTANEOUS at 14:47

## 2024-05-09 RX ADMIN — CYCLOBENZAPRINE HYDROCHLORIDE 5 MG: 5 TABLET, FILM COATED ORAL at 10:34

## 2024-05-09 RX ADMIN — CEFTRIAXONE SODIUM 2 G: 2 INJECTION, POWDER, FOR SOLUTION INTRAMUSCULAR; INTRAVENOUS at 01:03

## 2024-05-09 RX ADMIN — OXYCODONE HYDROCHLORIDE 2.5 MG: 5 TABLET ORAL at 22:03

## 2024-05-09 RX ADMIN — OXYCODONE HYDROCHLORIDE 5 MG: 5 TABLET ORAL at 06:01

## 2024-05-09 RX ADMIN — ACETAMINOPHEN 1000 MG: 500 TABLET ORAL at 21:13

## 2024-05-09 RX ADMIN — CYCLOBENZAPRINE HYDROCHLORIDE 5 MG: 5 TABLET, FILM COATED ORAL at 15:47

## 2024-05-09 RX ADMIN — SODIUM CHLORIDE 250 ML: 9 INJECTION, SOLUTION INTRAVENOUS at 18:38

## 2024-05-09 RX ADMIN — HEPARIN SODIUM 5000 UNITS: 10000 INJECTION, SOLUTION INTRAVENOUS; SUBCUTANEOUS at 05:37

## 2024-05-09 RX ADMIN — SODIUM CHLORIDE 500 ML: 9 INJECTION, SOLUTION INTRAVENOUS at 12:06

## 2024-05-09 RX ADMIN — ACETAMINOPHEN 650 MG: 325 TABLET, FILM COATED ORAL at 03:45

## 2024-05-09 RX ADMIN — AMIODARONE HYDROCHLORIDE 1 MG/MIN: 50 INJECTION, SOLUTION INTRAVENOUS at 14:55

## 2024-05-09 RX ADMIN — CETIRIZINE HYDROCHLORIDE 10 MG: 10 TABLET, FILM COATED ORAL at 08:05

## 2024-05-09 RX ADMIN — HEPARIN SODIUM 5000 UNITS: 10000 INJECTION, SOLUTION INTRAVENOUS; SUBCUTANEOUS at 22:03

## 2024-05-09 RX ADMIN — ASPIRIN 81 MG: 81 TABLET, COATED ORAL at 08:05

## 2024-05-09 RX ADMIN — OXYCODONE HYDROCHLORIDE 2.5 MG: 5 TABLET ORAL at 14:59

## 2024-05-09 RX ADMIN — PREDNISONE 5 MG: 5 TABLET ORAL at 08:05

## 2024-05-09 RX ADMIN — SODIUM CHLORIDE 500 ML: 9 INJECTION, SOLUTION INTRAVENOUS at 09:22

## 2024-05-09 RX ADMIN — ATORVASTATIN CALCIUM 40 MG: 40 TABLET, FILM COATED ORAL at 08:05

## 2024-05-09 RX ADMIN — Medication 1 TABLET: at 14:47

## 2024-05-09 RX ADMIN — SODIUM POLYSTYRENE SULFONATE 15 G: 15 SUSPENSION ORAL; RECTAL at 15:48

## 2024-05-09 RX ADMIN — ACETAMINOPHEN 1000 MG: 500 TABLET ORAL at 08:05

## 2024-05-09 RX ADMIN — SODIUM CHLORIDE, POTASSIUM CHLORIDE, SODIUM LACTATE AND CALCIUM CHLORIDE 1000 ML: 600; 310; 30; 20 INJECTION, SOLUTION INTRAVENOUS at 03:08

## 2024-05-09 RX ADMIN — SODIUM CHLORIDE: 9 INJECTION, SOLUTION INTRAVENOUS at 13:00

## 2024-05-09 RX ADMIN — METOPROLOL SUCCINATE 75 MG: 50 TABLET, EXTENDED RELEASE ORAL at 05:38

## 2024-05-09 RX ADMIN — SODIUM CHLORIDE 500 ML: 9 INJECTION, SOLUTION INTRAVENOUS at 10:47

## 2024-05-09 RX ADMIN — DIGOXIN 125 MCG: 0.25 INJECTION INTRAMUSCULAR; INTRAVENOUS at 18:39

## 2024-05-09 RX ADMIN — AMIODARONE HYDROCHLORIDE 150 MG: 1.5 INJECTION, SOLUTION INTRAVENOUS at 14:43

## 2024-05-09 RX ADMIN — ACETAMINOPHEN 1000 MG: 500 TABLET ORAL at 14:47

## 2024-05-09 RX ADMIN — SODIUM CHLORIDE 1000 ML: 9 INJECTION, SOLUTION INTRAVENOUS at 01:52

## 2024-05-09 RX ADMIN — SODIUM CHLORIDE 1000 ML: 9 INJECTION, SOLUTION INTRAVENOUS at 00:11

## 2024-05-09 RX ADMIN — SODIUM CHLORIDE: 9 INJECTION, SOLUTION INTRAVENOUS at 07:29

## 2024-05-09 ASSESSMENT — ACTIVITIES OF DAILY LIVING (ADL)
ADLS_ACUITY_SCORE: 40
ADLS_ACUITY_SCORE: 51
ADLS_ACUITY_SCORE: 37
ADLS_ACUITY_SCORE: 54
ADLS_ACUITY_SCORE: 54
ADLS_ACUITY_SCORE: 35
ADLS_ACUITY_SCORE: 40
ADLS_ACUITY_SCORE: 35
ADLS_ACUITY_SCORE: 54
ADLS_ACUITY_SCORE: 51
DEPENDENT_IADLS:: CLEANING;COOKING;LAUNDRY;MEAL PREPARATION;SHOPPING;MEDICATION MANAGEMENT;MONEY MANAGEMENT;TRANSPORTATION;INCONTINENCE
ADLS_ACUITY_SCORE: 40
ADLS_ACUITY_SCORE: 51
ADLS_ACUITY_SCORE: 51
ADLS_ACUITY_SCORE: 40
ADLS_ACUITY_SCORE: 37
ADLS_ACUITY_SCORE: 54
ADLS_ACUITY_SCORE: 54
ADLS_ACUITY_SCORE: 52
ADLS_ACUITY_SCORE: 40
ADLS_ACUITY_SCORE: 51
ADLS_ACUITY_SCORE: 54

## 2024-05-09 NOTE — PROGRESS NOTES
S-(situation): Patient arrives to room 217 via cart from ED [facility]    B-(background): Admitted for Urosepsis, A-fib RVR.  -150    A-(assessment): Pt with Intermittent confusion, BP soft, Got Bolus x3 in ED    R-(recommendations): Orders reviewed with Pt. Will monitor patient per MD orders.     Inpatient nursing criteria listed below were met:    Health care directives status obtained and documented: Yes  VTE ordered/documented: Yes  Skin issues/needs documented:Yes  Isolation addressed and Signage used: NA  Fall Prevention: Care plan updated Yes Education given and documented NA  Care Plan initiated and Co-Morbidities added: Yes  Education Assessment documented:Yes  Admission Education Documented: Yes  If present CAUTI/CLABI Education done: NA  New medication patient education completed and documented (Possible Side Effects of Common Medications handout): Yes  Allergies Reviewed: Yes  Admission Medication Reconciliation completed: Yes  Home medications if not able to send immediately home with family stored here: NA  Reminder note placed in discharge instructions regarding home meds: NA  Individualized care needs/preferences addressed and charted: Yes  Provider Notified that patient has arrived to the unit: Yes

## 2024-05-09 NOTE — ED TRIAGE NOTES
Pt presents by EMS from [facility] in [place]. Pt has been not eating and not interacting with staff lately. Pt sent in for abnormal labs.

## 2024-05-09 NOTE — ED NOTES
Bed: ED07  Expected date: 5/8/24  Expected time: 10:40 PM  Means of arrival:   Comments:  Abnormal Labs

## 2024-05-09 NOTE — PROVIDER NOTIFICATION
BP's have been in the 60's-80's systolically with maps in the 50's-60's. Bp goes up slightly when pt is aroused but still in the 80's. HR 90's- low 100's. MD updated.     1830: MD wants to continue Amiodorone infusion. Will order 1 x digoxin and 250ml bolus overy 1 hour.

## 2024-05-09 NOTE — H&P
[facility]    History and Physical - Hospitalist Service       Date of Admission:  5/8/2024    Assessment & Plan      Acute renal failure,   -    Likely due to dehydration, in addition to her home medications including Entresto and Aldactone.  Will hold them for now.  -    Patient did receive 2 L of normal saline in the ED.  But due to history of heart failure will do gentle hydration.  Monitor for any signs of CHF exacerbation.  -    Renal ultrasound is pending.  Nephrology on consult appreciate input.    Urinary tract infection  -     Start IV Rocephin pending urinary cultures.    A fib RVR s/p watchman procedure   -     Blood pressure has improved after IV hydration.  Will restart patient's metoprolol XL 75 mg p.o. daily.  Cardiology is on consult appreciate input.    Chronic diastolic CHF  -     Will hold Bumex due to the acute kidney injury.  Monitor for any signs of CHF exacerbation.    Hx of CVA with left sided weakness   -    At baseline patient has left-sided weakness and facial droop.    -    Currently on Lipitor, aspirin    Hypertension   -     Initially patient was hypotensive but did respond well to IV hydration.  -     Metoprolol XL has been restarted.  Continue to hold Aldactone, Entresto, Bumex due to acute kidney injury.    COPD  -    Patient is not in any exacerbation at this time.  -    Albuterol as needed if needed.  Pulmonary hypertension   Morbid obesity   Chronic pain syndrome.       Diet:  cardiac   DVT Prophylaxis: Heparin SQ  Miramontes Catheter: Not present  Lines: None     Cardiac Monitoring: None  Code Status:   DNI otherwise ok with resuscitation.    Clinically Significant Risk Factors Present on Admission        # Hyperkalemia: Highest K = 6.1 mmol/L in last 2 days, will monitor as appropriate      # Anion Gap Metabolic Acidosis: Highest Anion Gap = 21 mmol/L in last 2 days, will monitor and treat as appropriate    # Drug Induced Platelet Defect: home  medication list includes an antiplatelet medication                   Disposition Plan     Medically Ready for Discharge: Anticipated in 2-4 Days           Joe Puri MD  Hospitalist Service  [facility]  Securely message with [employer] (more info)  Text page via [employer] Paging/Directory     ______________________________________________________________________    Chief Complaint   Confusion, fatigue     History is obtained from the patient    History of Present Illness   Jayashree Carson is a 73 year old female with past medical history of atrial fibrillation status post Watchman procedure, CVA with left-sided weakness, hypertension, COPD, pulmonary hypertension, morbid obesity was transferred from nursing [facility] due to increased confusion and generalized fatigue.  Patient's symptoms have been increasing for the past couple of days.  Patient is alert and oriented x 3.  Denies any fever, chills, chest pain, shortness of breath, abdominal pain or urinary symptoms.  On presentation to the ED patient's vital signs were significant for low blood pressure.  She was given 2 L of fluids.  With improvement of her blood pressure.  Her labs were significant for elevation in creatinine 2.63.  In taking a look back at her previous creatinine and it was up to 3.12.  But a couple months ago her creatinine was actually normal at 1.  States she has had decreased appetite.  UA was positive for a urinary tract infection and patient was started on IV Rocephin.    The patient reached ICU her blood pressure has been stabilized but unfortunately heart rate was in the 150s with A-fib with RVR.  We are giving her dose of metoprolol XL 75 mg.      Past Medical History      Acute on chronic diastolic CHF  Chronic pain syndrome  Contracture left hand  Hemiplegia and hemiparesis following cerebral infarction affecting left nondominant side  Hyperlipidemia   Mrbid obesity  Peripheral vascular  disease  Pulmonary hypertension  Acute pulmonary edema  COPD  Obstipation  Depression essential hypertension  Low back pain  Atrial fibrillation with Watchman    Past Surgical History   No past surgical history on file.    Prior to Admission Medications   Prior to Admission Medications   Prescriptions Last Dose Informant Patient Reported? Taking?   acetaminophen (TYLENOL) 500 MG tablet 5/8/2024 at 2000  Yes Yes   Sig: Take 500-1,000 mg by mouth 2 times daily   albuterol (PROVENTIL) (2.5 MG/3ML) 0.083% neb solution Past Week  Yes Yes   Sig: Take 2.5 mg by nebulization every 6 hours as needed for shortness of breath, wheezing or cough   aspirin 81 MG EC tablet 5/8/2024 at 0800  Yes Yes   Sig: Take 81 mg by mouth daily   atorvastatin (LIPITOR) 40 MG tablet 5/8/2024 at 2000  Yes Yes   Sig: Take 40 mg by mouth daily   bumetanide (BUMEX) 1 MG tablet 5/8/2024 at 0800  Yes Yes   Sig: Take 2 mg by mouth daily   cetirizine (ZYRTEC) 10 MG tablet 5/8/2024 at 0800  Yes Yes   Sig: Take 10 mg by mouth daily   diclofenac (VOLTAREN) 1 % topical gel 5/7/2024 at 0800  Yes Yes   Sig: Apply 2 g topically 4 times daily   fluticasone (FLONASE) 50 MCG/ACT nasal spray 5/8/2024 at 2100  Yes Yes   Sig: Spray 1 spray into both nostrils daily   methyl salicylate external liquid 5/8/2024 at 2000  Yes Yes   Sig: Apply topically as needed for moderate to severe pain   metoprolol succinate ER (TOPROL XL) 25 MG 24 hr tablet 5/8/2024 at 0800  Yes Yes   Sig: Take 75 mg by mouth daily   potassium chloride peyman ER (KLOR-CON M20) 20 MEQ CR tablet 5/8/2024 at 0800  Yes Yes   Sig: Take 20 mEq by mouth 2 times daily   predniSONE (DELTASONE) 5 MG tablet 5/8/2024 at 0800  Yes Yes   Sig: Take 5 mg by mouth daily   sacubitril-valsartan (ENTRESTO) 24-26 MG per tablet 5/8/2024 at 2000  Yes Yes   Sig: Take 1 tablet by mouth 2 times daily   spironolactone (ALDACTONE) 25 MG tablet 5/8/2024 at 0800  Yes Yes   Sig: Take 25 mg by mouth daily   traMADol (ULTRAM) 50 MG  tablet 5/7/2024 at 2139  Yes Yes   Sig: Take 50 mg by mouth every 8 hours as needed for severe pain      Facility-Administered Medications: None        Review of Systems    The 10 point Review of Systems is negative other than noted in the HPI or here.   Generalized weakness.     Physical Exam   Vital Signs: Temp: 97.2  F (36.2  C) Temp src: Axillary BP: 95/43 Pulse: 120   Resp: 14 SpO2: 97 %      Weight: 220 lbs 0 oz    Constitutional: awake, alert, cooperative, no apparent distress, and appears stated age  Eyes: pupils equal, sclera clear, conjunctiva normal  Respiratory: No increased work of breathing, good air exchange, clear to auscultation bilaterally, no crackles or wheezing  Cardiovascular: Irregular rhythm and rate.  GI: No scars, normal bowel sounds, soft, non-distended, non-tender, no masses palpated, no hepatosplenomegally  Skin: no redness, warmth, or swelling  Musculoskeletal: Trace bilateral lower extremity edema  Neurologic: Patient is alert and oriented x 3.  She does have left-sided weakness with contracted left hand.  As well as left-sided facial droop from previous stroke in 2016.    Medical Decision Making       55 MINUTES SPENT BY ME on the date of service doing chart review, history, exam, documentation & further activities per the note.      Data     I have personally reviewed the following data over the past 24 hrs:    14.0 (H)  \   15.4   / 301     136 99 109.8 (H) /  79   5.3 16 (L) 2.63 (H) \     ALT: 89 (H) AST: N/A AP: 174 (H) TBILI: 1.1   ALB: 4.0 TOT PROTEIN: 7.8 LIPASE: N/A     Trop: 69 (H) BNP: 15,089 (H)     TSH: 2.88 T4: N/A A1C: N/A     Procal: 0.27 CRP: 25.50 (H) Lactic Acid: 1.4         Imaging results reviewed over the past 24 hrs:   Recent Results (from the past 24 hour(s))   CT Head w/o Contrast    Narrative    EXAM: CT HEAD W/O CONTRAST  LOCATION: [facility]  DATE: 5/9/2024    INDICATION: altered mental status  COMPARISON:  3/1/2023.  TECHNIQUE: Routine CT Head without IV contrast. Multiplanar reformats. Dose reduction techniques were used.    FINDINGS:  INTRACRANIAL CONTENTS: No intracranial hemorrhage, extraaxial collection, or mass effect.  No CT evidence of acute infarct. There is diffuse confluent low-attenuation within the periventricular and subcortical white matter consistent with advanced small   vessel ischemia disease. There is encephalomalacia involving the right middle cerebral artery territory, the left parietal lobe, left occipital lobe and the inferior left cerebellum. The ventricular system, basal cisterns and the cortical sulci are   consistent with diffuse volume loss.     VISUALIZED ORBITS/SINUSES/MASTOIDS: No intraorbital abnormality. No paranasal sinus mucosal disease. No middle ear or mastoid effusion.    BONES/SOFT TISSUES: No acute abnormality.      Impression    IMPRESSION:  1.  No CT finding of a mass, hemorrhage or focal area suggestive of acute infarct.  2.  Diffuse age related changes with multiple areas of encephalomalacia as described above.   CT Chest Abdomen Pelvis w/o Contrast    Narrative    EXAM: CT CHEST ABDOMEN PELVIS W/O CONTRAST  LOCATION: [facility]  DATE: 5/9/2024    INDICATION: RLQ tenderness, tachycardia, acute renal failure, dehydration. concern for sepsis  COMPARISON: Noncontrast CT of the chest 4/11/2024.  TECHNIQUE: CT scan of the chest, abdomen, and pelvis was performed without IV contrast. Multiplanar reformats were obtained. Dose reduction techniques were used.   CONTRAST: None.    FINDINGS:   LUNGS AND PLEURA: Irregular consolidation at the posterior base of the right lower lobe compatible with postpneumonic scarring (images 228-252 of axial series 4). Stable scarring anterior right middle lobe (image 190 of series 4). Mild streaky   scarring/atelectasis of the lower left lung. Small benign calcified granulomata in the right upper and right lower  lobes.    MEDIASTINUM/AXILLAE: Enlarged heart. Left atrial appendage occlusion device in position. Small calcified right hilar and mediastinal lymph nodes related to benign granulomatous disease.    CORONARY ARTERY CALCIFICATION: Severe.    HEPATOBILIARY: Postcholecystectomy. Lobulated contour of the liver concerning for cirrhosis.    PANCREAS: Normal.    SPLEEN: Numerous tiny benign calcified granulomata.    ADRENAL GLANDS: Normal.    KIDNEYS/BLADDER: 2 mm nonobstructing stones in both renal collecting systems. Mild cortical scarring of the left kidney. Small benign cysts of the kidneys not warranting follow-up. Normal bladder.    BOWEL: Colonic diverticulosis. No bowel obstruction or inflammation. Normal appendix. No free fluid or gas.    LYMPH NODES: Normal.    VASCULATURE: Moderate aortoiliac atherosclerosis.    PELVIC ORGANS: Normal.    MUSCULOSKELETAL: Degenerative changes of the spine. Mild convex left lumbar scoliosis.      Impression    IMPRESSION:  1.  No acute process identified in the chest, abdomen or pelvis.  2.  Areas of scarring in both lungs.  3.  Cardiomegaly and severe coronary artery calcification.  4.  Lobulated contour of the liver concerning for cirrhosis.  5.  Bilateral nonobstructive nephrolithiasis.  6.  Colonic diverticulosis.

## 2024-05-09 NOTE — CONSULTS
Care Management Initial Consult    General Information  Assessment completed with: VM-chart review, Care Team Member, Myriam Neal [facility] RN  Type of CM/SW Visit: Initial Assessment    Primary Care Provider verified and updated as needed: Yes   Readmission within the last 30 days: no previous admission in last 30 days      Reason for Consult: discharge planning  Advance Care Planning: Advance Care Planning Reviewed: other (see comments) (none on file)          Communication Assessment  Patient's communication style: spoken language (English or Bilingual)    Hearing Difficulty or Deaf: no   Wear Glasses or Blind: no    Cognitive  Cognitive/Neuro/Behavioral: .WDL except, level of consciousness, mood/behavior, orientation, speech  Level of Consciousness: intermittent confusion  Arousal Level: opens eyes spontaneously  Orientation: disoriented to, situation        Speech: clear    Living Environment:   People in home: facility resident     Current living Arrangements: other (see comments) (LTC)  [facility]: [facility]   Able to return to prior arrangements:         Family/Social Support:  Care provided by: other (see comments) (facility staff)  Provides care for: no one, unable/limited ability to care for self  Marital Status: Single  Children          Description of Support System: Supportive, Involved    Support Assessment: Adequate family and caregiver support    Current Resources:   Patient receiving home care services: No     Community Resources: County Worker, , Other (see comment) (CC coordinator)  Equipment currently used at home: walker, rolling, wheelchair, manual  Supplies currently used at home: Incontinence Supplies    Employment/Financial:  Employment Status: retired        Financial Concerns: none   Referral to Financial Worker: No       Does the patient's insurance plan have a 3 day qualifying hospital stay waiver?  No    Lifestyle & Psychosocial Needs:  Social  Determinants of Health     Food Insecurity: Not on file   Depression: Not on file   Housing Stability: Not on file   Tobacco Use: Not on file   Financial Resource Strain: Not on file   Alcohol Use: Not on file   Transportation Needs: Not on file   Physical Activity: Not on file   Interpersonal Safety: Not on file   Stress: Not on file   Social Connections: Not on file   Health Literacy: Not on file       Functional Status:  Prior to admission patient needed assistance:   Dependent ADLs:: Ambulation-walker, Bathing, Dressing, Grooming, Incontinence, Positioning, Transfers, Wheelchair-with assist, Toileting, Eating  Dependent IADLs:: Cleaning, Cooking, Laundry, Meal Preparation, Shopping, Medication Management, Money Management, Transportation, Incontinence       Mental Health Status:  Mental Health Status: No Current Concerns       Chemical Dependency Status:  Chemical Dependency Status: No Current Concerns             Values/Beliefs:  Spiritual, Cultural Beliefs, Cheondoism Practices, Values that affect care: no               Additional Information:  Per MD at IDT rounds pt is NOT medically ready for discharge today. CM consulted due to discharge planning, patient comes from [facility] (Phone: [phone] Fax: [phone]) LTC unit.    CM placed call to [facility] to perform assessment, [facility] pt resides in LTC facility is A1 with all cares and wheelchair bound except for small distances. Patient will need w/c transport upon discharge. [facility] patient has financial worker [facility], unsure of name, [phone]    CM placed call to Liv to introduce self/role, Liv stated pt was hospitalized at [facility] 4/9-4/15 for CHF, this is not displayed in chart review care everywhwere. CM informed Liv pt to return to LTC once medically stable.    CM placed call to Myriam QUEEN at [facility] (Phone: [phone] Fax:  [phone]) LTC to discuss baseline, Patient is A1 with all IADLS and ADLS at baseline. Patient has a walker for pivot transfers and short distances, pt is wheelchair bound for longer distances. Per facility unsure if bedhold is in place, facility to contact CM once known. RN to RN [phone].    CM to continue to follow during hospitalization for discharge planning.     Update 3:30- Confirmed with Mary in admissions patient has a bedhold, weekend phone number [phone]    Plan: Return to [facility] (Phone: [phone] Fax: [phone]) LTC    [facility]omal [facility]stud[facility] RNCM  Care Transitions Registered Nurse  Tele: [phone]

## 2024-05-09 NOTE — PROGRESS NOTES
"CLINICAL NUTRITION SERVICES - ASSESSMENT NOTE     Nutrition Prescription    RECOMMENDATIONS FOR MDs/PROVIDERS TO ORDER:  None at this time    Malnutrition Status:    Unable to determine due to lack of NFPE - pt was unavailable, will retry as able. Lack of recent weight data.    Recommendations already ordered by Registered Dietitian (RD):  Will offer trial of Ensure enlive (no chocolate) TID with each meal to support PO intake - will adjust based on pt preference/tolerance     Multivitamin with minerals - ordered due to poor intake less than 75% of estimated needs    Future/Additional Recommendations:  Will follow to monitor oral intake, weight changes, GI symptoms/BMs, and nutritional supplement tolerance      REASON FOR ASSESSMENT  Jayashree Carson is a/an 73 year old female assessed by the dietitian for: identified at risk via screening criteria    MST score of 2: recent weight loss of 2-13 lbs, poor appetite noted    NUTRITION HISTORY  Pt admitted via EMS from [facility]. Has been eating less lately and less interactive with staff. Has been withdrawn from peers and staff the last few days and refusing meals. BP have been a bit lower, having increased pain. Pt provided some hx upon arrival, is aware that is not oriented to day, date, or month. Knows that it is 2024. Has not been feeling well, was in the [facility] ED recently. Is not sure how long ago, but they sent pt back to nursing home. Pt reports hurting \"all over.\" Was able to walk before went to [facility] ED but has not been able to since. Has not had much to eat or drink recently, with urine output markedly less. Not sure the last time urinated. Has had a little diarrhea. No vomiting. Had labs drawn at nursing home earlier in the day of arrival, was told to go to ED because they were abnormal. Renal function has markedly deteriorated. Has chronic a.fib with watchman. Pt reports hx of abdominal surgery but not sure what it was for. Uses a walker/wheelchair at " "baseline.     Found to have urosepsis, acute renal failure likely related to dehydration, UTI, a.fib    Dxhx of stage 3a CKD, CHF, chronic pain syndrome, contracture of left hand, hemiplegia and hemiparesis following cerebral infarction affecting left nondominant side, hyperlipidemia, morbid obesity, PVD, pulm HTN, acute pulmonary edema, COPD, depression, HTN, a.fib with watchman    No previous RD notes available per chart review for additional nutrition hx.     CURRENT NUTRITION ORDERS  Diet: Orders Placed This Encounter      Combination Diet Low Saturated Fat Na <2400mg Diet, No Caffeine Diet    Intake/Tolerance: 0% this AM per flow sheet. No GI concerns - bowel sounds are audible, normoactive in all quadrants. Last BM was 5/8. Pt is passing flatus.     LABS  Labs reviewed - elevated potassium (critically elevated upon arrival), low CO2, elevated urea nitrogen and creatinine, low GFR, low calcium, elevated anion gap, elevated alkaline phosphatase, elevated ALT, elevated CRP, elevated BNP, elevated troponin    MEDICATIONS  Medications reviewed - tylenol, aspirin, lipitor, bumex (autoheld), zyrtec, flonase, heparin injection, toprol, potassium chloride (autoheld), prednisone, entresto (autoheld), aldactone (autoheld), sodium chloride IVF at 75 mL/hr = 1,800 mL per day, PRN oxycodone given today 5/9    ANTHROPOMETRICS  Height: 149.9 cm (4' 11\")  Most Recent Weight: 99.8 kg (220 lb 1.6 oz) via bed scale   IBW: 95 lbs  ADJ BW: 126 lbs  BMI: Obesity Grade III BMI >40  Weight History:   Wt Readings from Last 15 Encounters:   05/09/24 99.8 kg (220 lb 1.6 oz)   No other weight data available per chart review - unable to assess changes.    Dosing Weight: 57.3 kg using ADJ BW    ASSESSED NUTRITION NEEDS  Estimated Energy Needs: 1,433-1,719+ kcals/day (25 - 30+ kcals/kg)  Justification: Increased needs and Repletion  Estimated Protein Needs: 34-69+ grams protein/day (0.6 - 1.2+ grams of pro/kg)  Justification: CKD, " Hypercatabolism with critical illness, Increased needs, and Repletion  Estimated Fluid Needs: 1,433-1,719 mL/day (25 - 30 mL/kg)   Justification: Maintenance    PHYSICAL FINDINGS  See malnutrition section below.  Per chart review: blanchable redness to perineum, buttocks/IT, and sacrum/coccyx    MALNUTRITION  % Intake: </= 50% for >/= 5 days (severe) *estimated per chart review, will verify with pt as able - some confusion noted, so unsure if hx will be reliable  % Weight Loss: Unable to assess - no previous weight data found  Subcutaneous Fat Loss: Unable to assess  Muscle Loss: Unable to assess  Fluid Accumulation/Edema: Does not meet criteria - 1+ trace dependent and to BLEs  Malnutrition Diagnosis: Unable to determine due to lack of NFPE, no previous weight data    NUTRITION DIAGNOSIS  Inadequate oral intake related to poor appetite, acute on chronic illness as evidenced by estimated intake of </= 50% for >/= 5 days (severe), report of recent meal refusals, and increased needs above baseline    INTERVENTIONS  Implementation  Nutrition Education: Will be provided if education needs arise - slight confusion noted earlier in stay, will monitor and provide ed if appropriate    Collaboration with other providers  Medical food supplement therapy - Ensure enlive TID (no chocolate)  Multivitamin/mineral supplement therapy - ordered due to poor PO intake less than 75% of estimated needs    Goals  Patient to consume % of nutritionally adequate meal trays TID, or the equivalent with supplements/snacks  Pt weight will remain stable during admission  Pt will tolerate nutritional supplement      Monitoring/Evaluation  Progress toward goals will be monitored and evaluated per protocol.  Delaney Hernandez RD, LD  Clinical Dietitian  Office: [phone]  Weekend pager: [phone]

## 2024-05-09 NOTE — PROVIDER NOTIFICATION
Potassium now 5.8. Unable to obtain oral temp. Rectal temp 96.6. Warm blankets applied. MD updated. 1 x Kayexalate ordered. No bear hugger at this time.

## 2024-05-09 NOTE — PLAN OF CARE
"Goal Outcome Evaluation:      Plan of Care Reviewed With: patient    Overall Patient Progress: decliningOverall Patient Progress: declining    Outcome Evaluation: 4 Hour Shift Note: Bp's have been low this afternoon. MD aware and wants to continue Amiodorone infusion.  Additional 250ml bolus and 1 x digoxin given. HR has been in the 80's-low 100's. Unable to obtain oral temp at start of shift. Rectal temp of 96.6. MD did not want bear hugger applied. Warm blankets used and recheck was 97.5 rectally.  Pt has been alert and talkative. Reports tenderness and pain all over especially with repositioning. Flexeril given x 1. She also reported seeing bugs earlier this afternoon but when asked several hours later, reports \"it's better\".  Potassium 5.8. 1 x Kayexalate given. Pt did have 1 very large loose stool and was incontinent of urine at that time as well. Unable to capture amout. Poor appetite for supper taking bites only.      "

## 2024-05-09 NOTE — ED PROVIDER NOTES
"  History     Chief Complaint   Patient presents with    Abnormal Labs     HPI  Jayashree Carson is a 73 year old female who presents to the ED by EMS from [facility] in [place].  Apparently has been eating less lately and less interactive with staff.  That is the only history we got from medics.    Patient can actually give me some history and is aware although she is not oriented to day date or month.  She knows that it is 2024.    She states she has not been feeling well and was in the [facility] emergency department recently.  She does not recall how long ago but she states they sent her back to the [facility].  She was not pleased with that visit.  She hurts \"all over\".  States that she was able to walk before she went to [facility] emergency department but has not been able to sense.  Says she has not had much to eat or drink recently.  Urine output has been markedly last.  She is not sure of the last time she urinated.  She has had a little bit of diarrhea which she classifies as \"wet farts\".  No vomiting.  No significant cough.  She has oxygen per nasal cannula in place but states she is not on that on a regular basis.  I have very little in the way of old records.  She had labs drawn at the nursing home earlier today and apparently told to go to the hospital because they were abnormal.    Renal function has deteriorated markedly.  Last labs I have available were done on 11/10/2023 and showed creatinine 1.113, BUN 18.4, GFR 50 all of which were slightly worse compared to June 2023.  Earlier today her GFR is down to 15, creatinine up to 3.12 and her BUN is up to 113.  Some of this certainly could be prerenal so we will rehydrate her and see if that corrects.  Potassium up just slightly at 5.5, CO2 down at 15 with an anion gap of 21.  Blood glucose was low at 64 earlier today.    She has chronic A-fib but is not on anticoagulation because she had a watchman placed.  She had some sort of abdominal " surgery but does not recall what.    Finally was able to obtain some records from the nursing home.    Per nursing notes, she has been withdrawn from her peers and staff for a few days and has been refusing meals.  Her blood pressures have been a little lower throughout the week and has been having increasing pain as well.        Past medical history:    Acute on chronic diastolic CHF  Chronic pain syndrome  Contracture left hand  Hemiplegia and hemiparesis following cerebral infarction affecting left nondominant side  Hyperlipidemia   Mrbid obesity  Peripheral vascular disease  Pulmonary hypertension  Acute pulmonary edema  COPD  Obstipation  Depression essential hypertension  Low back pain  Atrial fibrillation with Watchman      POLST: Attempt CPR but DO NOT INTUBATE   Selective treatment: Use medical treatment, antibiotics, IV fluids and cardiac monitor as indicated.  No intubation, advanced airway interventions or mechanical ventilation management.  May consider less invasive airway support such as CPAP or BiPAP.  Transfer to hospital if indicated.  Generally avoid the ICU.    Allergies:  Not on File    Problem List:    There are no problems to display for this patient.       Past Medical History:    No past medical history on file.    Past Surgical History:    No past surgical history on file.    Family History:    No family history on file.    Social History:  Marital Status:          Medications:    acetaminophen (TYLENOL) 500 MG tablet  albuterol (PROVENTIL) (2.5 MG/3ML) 0.083% neb solution  aspirin 81 MG EC tablet  atorvastatin (LIPITOR) 40 MG tablet  bumetanide (BUMEX) 1 MG tablet  cetirizine (ZYRTEC) 10 MG tablet  diclofenac (VOLTAREN) 1 % topical gel  fluticasone (FLONASE) 50 MCG/ACT nasal spray  methyl salicylate external liquid  metoprolol succinate ER (TOPROL XL) 25 MG 24 hr tablet  potassium chloride peyman ER (KLOR-CON M20) 20 MEQ CR tablet  predniSONE (DELTASONE) 5 MG tablet  sacubitril-valsartan  (ENTRESTO) 24-26 MG per tablet  spironolactone (ALDACTONE) 25 MG tablet  traMADol (ULTRAM) 50 MG tablet          Review of Systems    Physical Exam   BP: (!) 132/119  Pulse: (!) 124  Temp: 97.2  F (36.2  C)  Resp: 16  Weight: 99.8 kg (220 lb)  SpO2: 98 %      Physical Exam    ED Course        Procedures              EKG Interpretation:      Interpreted by Derrick Haynes MD  Time reviewed: 11:18 PM   Symptoms at time of EKG: weakness, abnormal labs   Rhythm: atrial fibrillation - rapid  Rate: 146  Axis: Left Axis Deviation  Ectopy: none  Conduction: normal  ST Segments/ T Waves: T wave inversion I and aVL  Q Waves: none  Comparison to prior: No old EKG available    Clinical Impression: A-fib with a rapid ventricular response at 146.  Lateral T wave inversions in lead I and aVL.  No acute ST elevation or depression noted.            Critical Care time:  was 120 minutes for this patient excluding procedures.     The patient has signs of Severe Sepsis        If one the following conditions is present, a 30 mL/kg bolus is recommended as part of the 6 hour bundle (IBW can be used for BMI >30, or document refusal/contraindication):      1.   Initial hypotension  defined as 2 bps < 90 or map < 65 in the 6hrs before or 3hrs after time zero.     2.  Lactate >4.      The patient has signs of Severe Sepsis as evidenced by:    1. 2 SIRS criteria, AND  2. Suspected infection, AND   3. Organ dysfunction:  SBP <90, MAP < 65, or SBP decrease of >40 from baseline due to infection and ARF with Cr >2 due to infection    Time severe sepsis diagnosis confirmed: 2345  05/08/24 as this was the time when SBP <90 or MAP <65    3 Hour Severe Sepsis Bundle Completion:    1. Initial Lactic Acid Result:   Recent Labs   Lab Test 05/08/24  2332   LACT 1.4     2. Blood Cultures before Antibiotics: Yes  3. Broad Spectrum Antibiotics Administered:  yes       Anti-infectives (From admission through now)      Start     Dose/Rate Route  Frequency Ordered Stop    05/09/24 0045  cefTRIAXone (ROCEPHIN) 2 g vial to attach to  ml bag for ADULTS or NS 50 ml bag for PEDS         2 g  over 30 Minutes Intravenous ONCE 05/09/24 0040 05/09/24 0133            4. Is initial hypotension present?     Yes. (Definition - 2 SBPs <90, MAP <65, or decrease > 40 from baseline due to infection w/in 3 hrs of each other during the time period of 6 hrs before and 3 hrs after time zero)   Full 30 mL/kg bolus intentionally NOT administered to this patient due to obese patient - using ideal body weight and CHF and acute Pulmonary Edema. The target volume to infuse in this patient is 3000 gently.    BMI Readings from Last 1 Encounters:   No data found for BMI     30 mL/kg fluids based on weight: 2,990 mL  30 mL/kg fluids based on IBW (must be >= 60 inches tall): Patient ideal weight not available.                        Labs from earlier this morning at the nursing home:    Results for orders placed or performed in visit on 05/08/24 (from the past 24 hour(s))   Basic metabolic panel   Result Value Ref Range    Sodium 133 (L) 135 - 145 mmol/L    Potassium 5.5 (H) 3.4 - 5.3 mmol/L    Chloride 97 (L) 98 - 107 mmol/L    Carbon Dioxide (CO2) 15 (L) 22 - 29 mmol/L    Anion Gap 21 (H) 7 - 15 mmol/L    Urea Nitrogen 113.0 (H) 8.0 - 23.0 mg/dL    Creatinine 3.12 (H) 0.51 - 0.95 mg/dL    GFR Estimate 15 (L) >60 mL/min/1.73m2    Calcium 9.7 8.8 - 10.2 mg/dL    Glucose 64 (L) 70 - 99 mg/dL   CBC with platelets   Result Value Ref Range    WBC Count 13.3 (H) 4.0 - 11.0 10e3/uL    RBC Count 4.82 3.80 - 5.20 10e6/uL    Hemoglobin 14.4 11.7 - 15.7 g/dL    Hematocrit 44.0 35.0 - 47.0 %    MCV 91 78 - 100 fL    MCH 29.9 26.5 - 33.0 pg    MCHC 32.7 31.5 - 36.5 g/dL    RDW 14.4 10.0 - 15.0 %    Platelet Count 288 150 - 450 10e3/uL         Labs tonight.    Results for orders placed or performed during the hospital encounter of 05/08/24 (from the past 24 hour(s))   CBC with platelets  differential    Narrative    The following orders were created for panel order CBC with platelets differential.  Procedure                               Abnormality         Status                     ---------                               -----------         ------                     CBC with platelets and d...[[id number]]  Abnormal            Final result                 Please view results for these tests on the individual orders.   Comprehensive metabolic panel   Result Value Ref Range    Sodium 135 135 - 145 mmol/L    Potassium 6.1 (HH) 3.4 - 5.3 mmol/L    Carbon Dioxide (CO2) 15 (L) 22 - 29 mmol/L    Anion Gap 21 (H) 7 - 15 mmol/L    Urea Nitrogen 119.3 (H) 8.0 - 23.0 mg/dL    Creatinine 2.95 (H) 0.51 - 0.95 mg/dL    GFR Estimate 16 (L) >60 mL/min/1.73m2    Calcium 10.0 8.8 - 10.2 mg/dL    Chloride 99 98 - 107 mmol/L    Glucose 80 70 - 99 mg/dL    Alkaline Phosphatase 174 (H) 40 - 150 U/L    AST      ALT 89 (H) 0 - 50 U/L    Protein Total 7.8 6.4 - 8.3 g/dL    Albumin 4.0 3.5 - 5.2 g/dL    Bilirubin Total 1.1 <=1.2 mg/dL   Lactic acid whole blood w/ reflex x1 in 3 Hr when >2   Result Value Ref Range    Lactic Acid, Initial 1.4 0.7 - 2.0 mmol/L   Procalcitonin   Result Value Ref Range    Procalcitonin 0.27 <0.50 ng/mL   Troponin T, High Sensitivity   Result Value Ref Range    Troponin T, High Sensitivity 79 (H) <=14 ng/L   TSH with free T4 reflex   Result Value Ref Range    TSH 2.88 0.30 - 4.20 uIU/mL   Nt probnp inpatient (BNP)   Result Value Ref Range    N terminal Pro BNP Inpatient 15,089 (H) 0 - 900 pg/mL   CRP inflammation   Result Value Ref Range    CRP Inflammation 25.50 (H) <5.00 mg/L   Erythrocyte sedimentation rate auto   Result Value Ref Range    Erythrocyte Sedimentation Rate 17 0 - 30 mm/hr   CK total   Result Value Ref Range     26 - 192 U/L   CBC with platelets and differential   Result Value Ref Range    WBC Count 14.0 (H) 4.0 - 11.0 10e3/uL    RBC Count 5.17 3.80 - 5.20 10e6/uL     Hemoglobin 15.4 11.7 - 15.7 g/dL    Hematocrit 46.9 35.0 - 47.0 %    MCV 91 78 - 100 fL    MCH 29.8 26.5 - 33.0 pg    MCHC 32.8 31.5 - 36.5 g/dL    RDW 14.5 10.0 - 15.0 %    Platelet Count 301 150 - 450 10e3/uL    % Neutrophils 76 %    % Lymphocytes 17 %    % Monocytes 6 %    % Eosinophils 0 %    % Basophils 0 %    % Immature Granulocytes 1 %    NRBCs per 100 WBC 0 <1 /100    Absolute Neutrophils 10.6 (H) 1.6 - 8.3 10e3/uL    Absolute Lymphocytes 2.4 0.8 - 5.3 10e3/uL    Absolute Monocytes 0.9 0.0 - 1.3 10e3/uL    Absolute Eosinophils 0.0 0.0 - 0.7 10e3/uL    Absolute Basophils 0.0 0.0 - 0.2 10e3/uL    Absolute Immature Granulocytes 0.1 <=0.4 10e3/uL    Absolute NRBCs 0.0 10e3/uL   Blood gas venous   Result Value Ref Range    pH Venous 7.33 7.32 - 7.43    pCO2 Venous 32 (L) 40 - 50 mm Hg    pO2 Venous 29 25 - 47 mm Hg    Bicarbonate Venous 17 (L) 21 - 28 mmol/L    Base Excess/Deficit Venous -8.1 (L) -3.0 - 3.0 mmol/L    FIO2 21     Oxyhemoglobin Venous 47 (L) 70 - 75 %    O2 Sat, Venous 47.3 (L) 70.0 - 75.0 %    Narrative    In healthy individuals, oxyhemoglobin (O2Hb) and oxygen saturation (SO2) are approximately equal. In the presence of dyshemoglobins, oxyhemoglobin can be considerably lower than oxygen saturation.   UA with Microscopic reflex to Culture    Specimen: Urine, Catheter   Result Value Ref Range    Color Urine Yellow Colorless, Straw, Light Yellow, Yellow    Appearance Urine Cloudy (A) Clear    Glucose Urine Negative Negative mg/dL    Bilirubin Urine Negative Negative    Ketones Urine Negative Negative mg/dL    Specific Gravity Urine 1.013 1.003 - 1.035    Blood Urine Small (A) Negative    pH Urine 5.0 5.0 - 7.0    Protein Albumin Urine 30 (A) Negative mg/dL    Urobilinogen Urine Normal Normal, 2.0 mg/dL    Nitrite Urine Negative Negative    Leukocyte Esterase Urine Large (A) Negative    Bacteria Urine Many (A) None Seen /HPF    WBC Clumps Urine Present (A) None Seen /HPF    RBC Urine 3 (H) <=2 /HPF     WBC Urine >182 (H) <=5 /HPF    Narrative    Urine Culture ordered based on laboratory criteria   CT Head w/o Contrast    Narrative    EXAM: CT HEAD W/O CONTRAST  LOCATION: [facility]  DATE: 5/9/2024    INDICATION: altered mental status  COMPARISON: 3/1/2023.  TECHNIQUE: Routine CT Head without IV contrast. Multiplanar reformats. Dose reduction techniques were used.    FINDINGS:  INTRACRANIAL CONTENTS: No intracranial hemorrhage, extraaxial collection, or mass effect.  No CT evidence of acute infarct. There is diffuse confluent low-attenuation within the periventricular and subcortical white matter consistent with advanced small   vessel ischemia disease. There is encephalomalacia involving the right middle cerebral artery territory, the left parietal lobe, left occipital lobe and the inferior left cerebellum. The ventricular system, basal cisterns and the cortical sulci are   consistent with diffuse volume loss.     VISUALIZED ORBITS/SINUSES/MASTOIDS: No intraorbital abnormality. No paranasal sinus mucosal disease. No middle ear or mastoid effusion.    BONES/SOFT TISSUES: No acute abnormality.      Impression    IMPRESSION:  1.  No CT finding of a mass, hemorrhage or focal area suggestive of acute infarct.  2.  Diffuse age related changes with multiple areas of encephalomalacia as described above.   CT Chest Abdomen Pelvis w/o Contrast    Narrative    EXAM: CT CHEST ABDOMEN PELVIS W/O CONTRAST  LOCATION: [facility]  DATE: 5/9/2024    INDICATION: RLQ tenderness, tachycardia, acute renal failure, dehydration. concern for sepsis  COMPARISON: Noncontrast CT of the chest 4/11/2024.  TECHNIQUE: CT scan of the chest, abdomen, and pelvis was performed without IV contrast. Multiplanar reformats were obtained. Dose reduction techniques were used.   CONTRAST: None.    FINDINGS:   LUNGS AND PLEURA: Irregular consolidation at the posterior base of the right lower  lobe compatible with postpneumonic scarring (images 228-252 of axial series 4). Stable scarring anterior right middle lobe (image 190 of series 4). Mild streaky   scarring/atelectasis of the lower left lung. Small benign calcified granulomata in the right upper and right lower lobes.    MEDIASTINUM/AXILLAE: Enlarged heart. Left atrial appendage occlusion device in position. Small calcified right hilar and mediastinal lymph nodes related to benign granulomatous disease.    CORONARY ARTERY CALCIFICATION: Severe.    HEPATOBILIARY: Postcholecystectomy. Lobulated contour of the liver concerning for cirrhosis.    PANCREAS: Normal.    SPLEEN: Numerous tiny benign calcified granulomata.    ADRENAL GLANDS: Normal.    KIDNEYS/BLADDER: 2 mm nonobstructing stones in both renal collecting systems. Mild cortical scarring of the left kidney. Small benign cysts of the kidneys not warranting follow-up. Normal bladder.    BOWEL: Colonic diverticulosis. No bowel obstruction or inflammation. Normal appendix. No free fluid or gas.    LYMPH NODES: Normal.    VASCULATURE: Moderate aortoiliac atherosclerosis.    PELVIC ORGANS: Normal.    MUSCULOSKELETAL: Degenerative changes of the spine. Mild convex left lumbar scoliosis.      Impression    IMPRESSION:  1.  No acute process identified in the chest, abdomen or pelvis.  2.  Areas of scarring in both lungs.  3.  Cardiomegaly and severe coronary artery calcification.  4.  Lobulated contour of the liver concerning for cirrhosis.  5.  Bilateral nonobstructive nephrolithiasis.  6.  Colonic diverticulosis.   Basic metabolic panel   Result Value Ref Range    Sodium 136 135 - 145 mmol/L    Potassium 5.3 3.4 - 5.3 mmol/L    Chloride 99 98 - 107 mmol/L    Carbon Dioxide (CO2) 16 (L) 22 - 29 mmol/L    Anion Gap 21 (H) 7 - 15 mmol/L    Urea Nitrogen 109.8 (H) 8.0 - 23.0 mg/dL    Creatinine 2.63 (H) 0.51 - 0.95 mg/dL    GFR Estimate 18 (L) >60 mL/min/1.73m2    Calcium 8.9 8.8 - 10.2 mg/dL    Glucose 79  70 - 99 mg/dL   Troponin T, High Sensitivity   Result Value Ref Range    Troponin T, High Sensitivity 69 (H) <=14 ng/L       Medications   lactated ringers BOLUS 1,000 mL (has no administration in time range)   sodium chloride 0.9% BOLUS 1,000 mL (0 mLs Intravenous Stopped 5/9/24 0131)   cefTRIAXone (ROCEPHIN) 2 g vial to attach to  ml bag for ADULTS or NS 50 ml bag for PEDS (0 g Intravenous Stopped 5/9/24 0133)   sodium chloride 0.9% BOLUS 1,000 mL (1,000 mLs Intravenous $New Bag 5/9/24 0152)       Assessments & Plan (with Medical Decision Making)  73-year-old resident of [facility] sent in because of abnormal labs that drawn earlier today and resulted this afternoon/evening.  Has been more withdrawn from staff and peers the last few days and refusing to eat or drink.  She states her urine output has been markedly decreased and her renal function has deteriorated significantly with a GFR of 15.  It was 50 just 6 months ago.  BUN and creatinine are also markedly elevated.  Suspect at least some of this is prerenal renal failure so we will rehydrate, recheck her labs, work her up for sepsis and she will need to stay in the hospital.  Potassium was only up slightly at 5.5.  Labs tonight show potassium to be up bit more at 6.1.  Renal function remains poor BUN of 109.8, creatinine 2.95,  GFR 16.  Lactic acid normal at 1.4 her white count is elevated at 14.0.  She meets SIRS criteria because of her tachycardia and elevated white count.  Her urine came back showing large leukocyte Estrace, greater than 182 white cells.  Urine culture is pending.  She was started on Rocephin for sepsis due to urinary tract infection.  She was started on gentle rehydration initially as she does have a history of CHF.  She does not look wet on her CT scan so the rate of her IV fluids is increased.  She has had a couple of low blood pressures and then rebounds low normal pressures.  Has remained in A-fib and  tachycardic but her rate has been trending downward slowly.  I think she needs more aggressive fluid resuscitation to replace her volume depletion due to inadequate oral intake over the last few days.  Initial troponin was elevated at 79.  Repeat 2 hours later is come down to 69.  We repeated her basic profile and she is trending towards improvement.  Potassium is now normal at 5.3, .8, creatinine 2.63, GFR 18.  Continue to rehydrate her and follow her renal function.  I think her big problem is severe dehydration and intravascular volume depletion due to inadequate oral intake over the last few days.  Nursing staff was understandably cautious with the initial IV fluid bolus so as not to push her into CHF but she does not look wet on her CT so I think we can be more aggressive with fluids  Head CT was negative for any acute intracranial process.  CT of the chest abdomen pelvis without contrast also was negative for any acute process.  I spoke with Dr Puri, hospitalist on-call.  He has assumed her care and will write orders.  She will go to the ICU because her pressures have been soft.  Currently on her second liter of fluid.      I have reviewed the nursing notes.    I have reviewed the findings, diagnosis, plan and need for follow up with the patient.       Critical Care Addendum  My initial assessment, based on my review of prehospital provider report, review of nursing observations, review of vital signs, focused history, and physical exam, established a high suspicion that Jayashree Carson has sepsis with indication for early goal-directed therapy, altered mental status, and a high risk electrolyte abnormality, which requires immediate intervention, and therefore she is critically ill.     After the initial assessment, the care team initiated multiple lab tests, initiated IV fluid administration, and initiated medication therapy with IV antibiotics  to provide stabilization care. Due to the critical  nature of this patient, I reassessed nursing observations, vital signs, physical exam, 12 lead ECG analysis, and mental status multiple times prior to her disposition.     Time also spent performing documentation, discussion with family to obtain medical information for decision making, and reviewing test results.     Critical care time (excluding teaching time and procedures): 120 minutes.     ED to Inpatient Handoff:    Discussed with Dr Puri at 0244  Patient accepted for Inpatient Stay  Pending studies include BC, UC  Code Status: Attempt CPR but DO NOT INTUBATE or use mechanical ventilation.  Noninvasive respiratory support such as CPAP/BiPAP would be acceptable.  IV fluids, IV antibiotics acceptable.             Medical Decision Making  The patient's presentation was of high complexity (an acute health issue posing potential threat to life or bodily function).    The patient's evaluation involved:  review of external note(s) from 3+ sources (see separate area of note for details)  review of 3+ test result(s) ordered prior to this encounter (see separate area of note for details)  ordering and/or review of 3+ test(s) in this encounter (see separate area of note for details)  discussion of management or test interpretation with another health professional (see separate area of note for details)    The patient's management necessitated high risk (a decision regarding hospitalization).        New Prescriptions    No medications on file       Final diagnoses:   None       5/8/2024   [facility] EMERGENCY DEPT       Derrick Haynes MD  05/09/24 3365

## 2024-05-09 NOTE — PROGRESS NOTES
[facility]  Procedure Note        Jayashree Carson  [id number]   May 9, 2024 Indication: Poor IV access           Pause for the cause: Consent for catheter placement procedure signed  Time out completed  Patient ID's verified using two distinct indicators  All necessary equipment is present  Site marked if extremity to be used has been predetermined  CVA, L sided weakness. Will use RUE   Type of line to be used: Midline catheter   Full barrier precautions used: Yes   Skin preparation: Chloraprep   Date of insertion: May 9, 2024   Device type: Single lumen, non-valved, 4.0   Catheter brand: [employer]   Lot number: [id number] midline   Insertion location: Right cephalic vein   Method of placement: Venipuncture  MST  Ultrasound   Number of attempts: With ultrasound: 3   Without ultrasound: 0   Difficulty threading: Yes (see summary)   Midline IV device: Dressing dry and intact  Transparent semmipermeable dressing applied  Chlorhexidine patch  Catheter securement device   Arm circumference: Adults 10 cm   Midline extremity circumference: 37  cm   Vein diameter:  4mm    Internal length: 11 cm   Midline visible catheter length: 0 cm   Total catheter length: 11 cm   Tip termination: Axilla (midline)   Method of verification: External measurement   Midline patency post placement: Positive blood return  Flushes without difficulty  Saline locked   Line flush: Line flush documented on the eMAR   Placement verified by: Registered Nurse   Catheter placed by: Dodie Botello RN   Discontinuation form initiated: No   Patient tolerance: Tolerated well  Intradermal injection   PICC Educational information to patient (Information from PICC package):  Yes   VAD flushing orders entered:  Yes     Summary:  This procedure was performed with difficulty. There were no immediate complications.       Cannot use LUE due to CVA/left sided weakness.PICC attempt unsuccessful. Brachial vein and basilic veins accessed but could not advance wire. Cephalic  vein accessed.Could not thread past shoulder without resistance felt, on xray PICC seen coiled just past shoulder. Attempted to reposition but still could not thread smoothly past 20cm internal length. Over the wire PICC exchanged for midline to give pt stable access. Good blood return, flushes well. Labs drawn off midline for staff.   Per protocol:  If pt requires pressors over 24 hours, central internal jugular or subclavian line to be placed. Primary RN aware.

## 2024-05-09 NOTE — PLAN OF CARE
Goal Outcome Evaluation:      Plan of Care Reviewed With: patient, child    Overall Patient Progress: no changeOverall Patient Progress: no change    Outcome Evaluation: Pt. A&Ox 4, intermittent hallucinations of mice in the room. -150's but unable to administer metoprolol d/t BP MAPs. Amiodarone bolus and drip started at 1445, HR responding well. BPs improving. C/o pain to back down into the left leg, oxycodone x 2 and tylenol x 2. Samina area red, cares done and barrier cream applied. Edema to bilateral ankles, 2+. IVF continue at 75 mls/ hr following bolus x 3 this shift. Purwick in place, adequate urine output, cloudy yellow. Family at bedside most of shift, attentive to patient, involved in cares.

## 2024-05-09 NOTE — PLAN OF CARE
"Goal Outcome Evaluation:      Plan of Care Reviewed With: patient    Overall Patient Progress: decliningOverall Patient Progress: declining    Outcome Evaluation: Pt -150 A-fib RVR.  PIV placed by ultrasound, PICC or Central line ordered.  IV NS running at 50 ml/hr.  5mg oxycodone given for back pain.  Pt AAO 3-4 with intermittnet confusion.  IV ABX for Urosepsis/Martha/Dehydration.  got 3 L fluids in ED.  Left upper and lower extremety contracted from previous CVA.  pur wik in place, no UOP as of yet.  see flowsheets for skin issues. [facility] from [facility] [place].  uses wheelchair/Walker at baseline    BP (!) 83/61   Pulse 111   Temp 97.9  F (36.6  C) (Oral)   Resp 18   Ht 1.499 m (4' 11\")   Wt 99.8 kg (220 lb 1.6 oz)   SpO2 97%   BMI 44.45 kg/m     "

## 2024-05-09 NOTE — PROGRESS NOTES
S-(situation): MD updated regarding continued A-fib RVR with rates between 120-150s. BP low w/ low MAPs.    B-(background): Sepsis, UTI, NIC.     A-(assessment): Pt. Reports feeling overall okay but does appear more ill feeling than previously this AM. Refusing to answer orientation questions.     R-(recommendations): Hold order for x1 metoprolol, give 500 ml NS bolus over 30 min. Update after bolus completed.

## 2024-05-09 NOTE — PROGRESS NOTES
[facility]    Medicine Progress Note - Hospitalist Service    Date of Admission:  5/8/2024    Assessment & Plan   Jayashree Carson is a 73 year old female with past medical history of atrial fibrillation status post Watchman procedure, CVA with left-sided weakness, hypertension, COPD, pulmonary hypertension, morbid obesity was transferred from nursing [facility] due to increased confusion and generalized fatigue.  Patient's symptoms have been increasing for the past couple of days.  Patient is alert and oriented x 3.  Denies any fever, chills, chest pain, shortness of breath, abdominal pain or urinary symptoms.  On presentation to the ED patient's vital signs were significant for low blood pressure.  She was given 2 L of fluids.  With improvement of her blood pressure.  Her labs were significant for creatinine 3.12.  But a couple months ago her creatinine was actually normal at 1.  States she has had decreased appetite.  UA was abnormal and patient was started on IV Rocephin.     The patient reached ICU her blood pressure has been stabilized but unfortunately heart rate was in the 150s with A-fib with RVR.  Metoprolol XL 75 mg was given.    Code status was initially ordered as special code: all resuscitate measures, except intubation and mechanical ventilation. I explained to the patient and her daughter that resuscitation only works if all elements of the system are available to use, including potential intubation and mechanical ventilation. The patient kept saying that she wants to be resuscitated but doesn't want intubation. The daughter says that she always says this. I told the patient that if she wants to be resuscitated I'm changing her code status to Full Code, including intubation and mechanical ventilation. The patient didn't oppose but didn't affirm either.    Principal Problem:    Acute renal failure superimposed on stage 3a chronic kidney disease, unspecified acute  renal failure type (H)    Assessment: Acute renal failure likely due to combination of dehydration due to decreased PO intake and PTA medications. Renal function is rapidly improving with IV hydration: Cr 3.12 -->2.95 -->2.30 -->1.71.  She received 2000 ml of IV saline overnight and 1750 ml in boluses today.    Plan: 1. Continue holding spironolactone, bumetanide, Entresto.   2. Continue IV fluids, increase rate from 50 ml to 75 ml/hr.   3. BMP in am.    Active Problems:    Atrial fibrillation with RVR (H)    Assessment: Uncontrolled HR likely exacerbated by dehydration. She received her PTA dose of metoprolol at 4 am today. HR fast despite IV fluids. Amiodarone drip started this afternoon. HR decreased from 140's to 100-120.    Plan: 1. Continue amiodarone.   2. Digoxin 125 mcg IV x 1.   3. Continue metoprolol only if not hypotensive. Holding parameters added.      Arterial hypotension    Assessment: Likely due to combination of volume depletion/dehydration and regular dose of metoprolol given to control rapid atrial fibrillation.  She received 2000 ml of IV saline overnight and 1750 ml in boluses today. Initially MAP improved from 30's to 70's, then dropped to 50's.    Plan: 1. Continue IV fluids as above. Last bolus of 250 ml was ordered this afternoon.   2. Continue holding spironolactone, bumetanide, Entresto.   3. Continue metoprolol only if not hypotensive. Holding parameters added.      Hyperkalemia    Assessment: Secondary to acute renal failure. K 6.1 -->5.4 --> 5.8 just with IV saline.    Plan: 1. Kayexalate x 1 dose.   2. BMP in am.      Asymptomatic bacteriuria    Assessment: Abnormal UA but without symptoms. Cultures negative. Received ceftriaxone x 1 dose. Procalcitonin low x 2: 0.27, 0.14. Rapidly improving leukocytosis.    Plan: Continue ceftriaxone for now due to persistent hypotension. Likely discontinue within 24 hours if no new evidence of infection.      Increased anion gap metabolic acidosis    " Assessment: Secondary to acute renal failure. Slowly improving acidosis, 12 -->15. AG normalized.    Plan: 1. IV fluids as above.   2. BMP in am.          Diet: Combination Diet Low Saturated Fat Na <2400mg Diet, No Caffeine Diet    DVT Prophylaxis: Heparin SQ  Miramontes Catheter: Not present  Lines: None     Cardiac Monitoring: ACTIVE order. Indication: Tachyarrhythmias, acute (48 hours)  Code Status: Full Code      Clinically Significant Risk Factors Present on Admission        # Hyperkalemia: Highest K = 6.1 mmol/L in last 2 days, will monitor as appropriate      # Anion Gap Metabolic Acidosis: Highest Anion Gap = 21 mmol/L in last 2 days, will monitor and treat as appropriate    # Drug Induced Platelet Defect: home medication list includes an antiplatelet medication        # Severe Obesity: Estimated body mass index is 44.45 kg/m  as calculated from the following:    Height as of this encounter: 1.499 m (4' 11\").    Weight as of this encounter: 99.8 kg (220 lb 1.6 oz).              Disposition Plan     Medically Ready for Discharge: Anticipated in 2-4 Days             Alex Carvalho MD  Hospitalist Service  [facility]  Securely message with [employer] (more info)  Text page via [facility] Paging/Directory   ______________________________________________________________________    Interval History   Patient admitted last night with acute renal failure from assisted living facility with decreased oral intake and weakness. Denies shortness of breath, chest discomfort, abdominal pain.    Physical Exam   Vital Signs: Temp: 97.9  F (36.6  C) Temp src: Oral BP: (!) 72/44 (Manual) Pulse: (!) 133   Resp: 12 SpO2: 95 % O2 Device: None (Room air)    Weight: 220 lbs 1.6 oz    Constitutional: awake, fatigued, alert, cooperative, no apparent distress, appears stated age, and moderately obese  Eyes: Lids and lashes normal, pupils equal, round and reactive to light, extra ocular muscles intact, sclera " clear, conjunctiva normal  ENT: normocephalic, without obvious abnormality, atraumatic  Respiratory: No increased work of breathing, good air exchange, clear to auscultation bilaterally, no crackles or wheezing  Cardiovascular: irregularly irregular rhythm, normal S1 and S2, no S3, no S4, no murmur  GI: soft, non-distended, and non-tender  Skin: no bruising or bleeding and no rashes  Musculoskeletal: no lower extremity pitting edema present  there is no redness, warmth, or swelling of the joints  tone is normal  Neurologic: Awake, alert, oriented to name, place and time.  Cranial nerves II-XII are grossly intact.  Motor is 5 out of 5 on the right, left arm 4/5, left hand 3/5, left LE 4/5.  Sensory is intact.    Medical Decision Making       120 MINUTES SPENT BY ME on the date of service doing chart review, history, exam, documentation & further activities per the note.  MANAGEMENT DISCUSSED with the following over the past 24 hours: the patient, daughter, nurses, care coordination team   NOTE(S)/MEDICAL RECORDS REVIEWED over the past 24 hours: ED notes, H&P, nursing notes       Data   Imaging results reviewed over the past 24 hrs:   Recent Results (from the past 24 hour(s))   CT Head w/o Contrast    Narrative    EXAM: CT HEAD W/O CONTRAST  LOCATION: [facility]  DATE: 5/9/2024    INDICATION: altered mental status  COMPARISON: 3/1/2023.  TECHNIQUE: Routine CT Head without IV contrast. Multiplanar reformats. Dose reduction techniques were used.    FINDINGS:  INTRACRANIAL CONTENTS: No intracranial hemorrhage, extraaxial collection, or mass effect.  No CT evidence of acute infarct. There is diffuse confluent low-attenuation within the periventricular and subcortical white matter consistent with advanced small   vessel ischemia disease. There is encephalomalacia involving the right middle cerebral artery territory, the left parietal lobe, left occipital lobe and the inferior left  cerebellum. The ventricular system, basal cisterns and the cortical sulci are   consistent with diffuse volume loss.     VISUALIZED ORBITS/SINUSES/MASTOIDS: No intraorbital abnormality. No paranasal sinus mucosal disease. No middle ear or mastoid effusion.    BONES/SOFT TISSUES: No acute abnormality.      Impression    IMPRESSION:  1.  No CT finding of a mass, hemorrhage or focal area suggestive of acute infarct.  2.  Diffuse age related changes with multiple areas of encephalomalacia as described above.   CT Chest Abdomen Pelvis w/o Contrast    Narrative    EXAM: CT CHEST ABDOMEN PELVIS W/O CONTRAST  LOCATION: [facility]  DATE: 5/9/2024    INDICATION: RLQ tenderness, tachycardia, acute renal failure, dehydration. concern for sepsis  COMPARISON: Noncontrast CT of the chest 4/11/2024.  TECHNIQUE: CT scan of the chest, abdomen, and pelvis was performed without IV contrast. Multiplanar reformats were obtained. Dose reduction techniques were used.   CONTRAST: None.    FINDINGS:   LUNGS AND PLEURA: Irregular consolidation at the posterior base of the right lower lobe compatible with postpneumonic scarring (images 228-252 of axial series 4). Stable scarring anterior right middle lobe (image 190 of series 4). Mild streaky   scarring/atelectasis of the lower left lung. Small benign calcified granulomata in the right upper and right lower lobes.    MEDIASTINUM/AXILLAE: Enlarged heart. Left atrial appendage occlusion device in position. Small calcified right hilar and mediastinal lymph nodes related to benign granulomatous disease.    CORONARY ARTERY CALCIFICATION: Severe.    HEPATOBILIARY: Postcholecystectomy. Lobulated contour of the liver concerning for cirrhosis.    PANCREAS: Normal.    SPLEEN: Numerous tiny benign calcified granulomata.    ADRENAL GLANDS: Normal.    KIDNEYS/BLADDER: 2 mm nonobstructing stones in both renal collecting systems. Mild cortical scarring of the left kidney.  Small benign cysts of the kidneys not warranting follow-up. Normal bladder.    BOWEL: Colonic diverticulosis. No bowel obstruction or inflammation. Normal appendix. No free fluid or gas.    LYMPH NODES: Normal.    VASCULATURE: Moderate aortoiliac atherosclerosis.    PELVIC ORGANS: Normal.    MUSCULOSKELETAL: Degenerative changes of the spine. Mild convex left lumbar scoliosis.      Impression    IMPRESSION:  1.  No acute process identified in the chest, abdomen or pelvis.  2.  Areas of scarring in both lungs.  3.  Cardiomegaly and severe coronary artery calcification.  4.  Lobulated contour of the liver concerning for cirrhosis.  5.  Bilateral nonobstructive nephrolithiasis.  6.  Colonic diverticulosis.   US Renal Complete Non-Vascular    Narrative    ULTRASOUND RENAL COMPLETE NONVASCULAR  5/9/2024 9:23 AM    CLINICAL HISTORY:  Acute renal failure.    TECHNIQUE: Routine Bilateral Renal and Bladder Ultrasound.    COMPARISON: CT chest, abdomen and pelvis 5/9/2024.    FINDINGS:  RIGHT KIDNEY: 9.8 cm. Normal echogenicity without hydronephrosis or  masses.     LEFT KIDNEY: 8.8 cm. Normal echogenicity without hydronephrosis or  masses. Left renal cyst measuring 1.8 cm; no follow-up necessary.    BLADDER: Not visualized, probably decompressed.      Impression    IMPRESSION:  No collecting system dilatation.    KRISTIN SCOTT MD         SYSTEM ID:  [id number]   XR Chest Port 1 View    Narrative    CHEST ONE VIEW PORTABLE   5/9/2024 2:23 PM     HISTORY: RN placed PICC - verify tip placement.    COMPARISON: Chest x-ray 5/9/2024.      Impression    IMPRESSION:   1. Abnormal positioning of a right PICC line, its tip folded upon  itself and distally projects over the right axillary soft tissues.  Recommend repositioning.  2. Cardiomegaly. No acute airspace disease. Calcified granulomas on  the right.    KAYLYNN DARNELL MD         SYSTEM ID:  [id number]     Most Recent 3 CBC's:  Recent Labs   Lab Test 05/09/24  1651  05/09/24  0645 05/08/24  2332   WBC 9.2 11.5* 14.0*   HGB 12.4 13.8 15.4   MCV 95 91 91    245 301     Most Recent 3 BMP's:  Recent Labs   Lab Test 05/09/24  1427 05/09/24  0645 05/09/24  0204    136 136   POTASSIUM 5.8* 5.4* 5.3   CHLORIDE 111* 105 99   CO2 15* 12* 16*   BUN 88.3* 103.6* 109.8*   CR 1.71* 2.30* 2.63*   ANIONGAP 12 19* 21*   ALIDA 7.9* 8.7* 8.9   * 77 79     Most Recent 2 LFT's:  Recent Labs   Lab Test 05/08/24  2332   ALT 89*   ALKPHOS 174*   BILITOTAL 1.1     Most Recent 3 BNP's:  Recent Labs   Lab Test 05/08/24  2332   NTBNPI 15,089*     Most Recent TSH and T4:  Recent Labs   Lab Test 05/08/24  2332   TSH 2.88     Most Recent Urinalysis:  Recent Labs   Lab Test 05/09/24  0012   COLOR Yellow   APPEARANCE Cloudy*   URINEGLC Negative   URINEBILI Negative   URINEKETONE Negative   SG 1.013   UBLD Small*   URINEPH 5.0   PROTEIN 30*   NITRITE Negative   LEUKEST Large*   RBCU 3*   WBCU >182*

## 2024-05-09 NOTE — PROGRESS NOTES
Attempted to draw labs off of Midline. It is very positional and sluggish with blood return. Flushes well. Will change to lab collect in attempt to not jeopardize newly placed line.

## 2024-05-10 ENCOUNTER — APPOINTMENT (OUTPATIENT)
Dept: CARDIOLOGY | Facility: CLINIC | Age: 74
DRG: 314 | End: 2024-05-10
Attending: FAMILY MEDICINE
Payer: COMMERCIAL

## 2024-05-10 PROBLEM — Z95.818 PRESENCE OF WATCHMAN LEFT ATRIAL APPENDAGE CLOSURE DEVICE: Status: ACTIVE | Noted: 2024-05-10

## 2024-05-10 PROBLEM — J44.9 COPD (CHRONIC OBSTRUCTIVE PULMONARY DISEASE) (H): Status: ACTIVE | Noted: 2024-05-10

## 2024-05-10 PROBLEM — Z86.73 HISTORY OF CVA (CEREBROVASCULAR ACCIDENT): Status: ACTIVE | Noted: 2024-05-10

## 2024-05-10 PROBLEM — I27.20 PULMONARY HYPERTENSION (H): Status: ACTIVE | Noted: 2024-05-10

## 2024-05-10 PROBLEM — I10 ESSENTIAL HYPERTENSION: Status: ACTIVE | Noted: 2024-05-10

## 2024-05-10 LAB
ANION GAP SERPL CALCULATED.3IONS-SCNC: 10 MMOL/L (ref 7–15)
ANION GAP SERPL CALCULATED.3IONS-SCNC: 13 MMOL/L (ref 7–15)
BUN SERPL-MCNC: 65.1 MG/DL (ref 8–23)
BUN SERPL-MCNC: 69.6 MG/DL (ref 8–23)
CALCIUM SERPL-MCNC: 8.2 MG/DL (ref 8.8–10.2)
CALCIUM SERPL-MCNC: 8.5 MG/DL (ref 8.8–10.2)
CHLORIDE SERPL-SCNC: 116 MMOL/L (ref 98–107)
CHLORIDE SERPL-SCNC: 118 MMOL/L (ref 98–107)
CREAT SERPL-MCNC: 1.2 MG/DL (ref 0.51–0.95)
CREAT SERPL-MCNC: 1.46 MG/DL (ref 0.51–0.95)
DEPRECATED HCO3 PLAS-SCNC: 14 MMOL/L (ref 22–29)
DEPRECATED HCO3 PLAS-SCNC: 14 MMOL/L (ref 22–29)
EGFRCR SERPLBLD CKD-EPI 2021: 37 ML/MIN/1.73M2
EGFRCR SERPLBLD CKD-EPI 2021: 47 ML/MIN/1.73M2
ERYTHROCYTE [DISTWIDTH] IN BLOOD BY AUTOMATED COUNT: 14.8 % (ref 10–15)
GLUCOSE SERPL-MCNC: 111 MG/DL (ref 70–99)
GLUCOSE SERPL-MCNC: 75 MG/DL (ref 70–99)
HCT VFR BLD AUTO: 37.2 % (ref 35–47)
HGB BLD-MCNC: 11.5 G/DL (ref 11.7–15.7)
HOLD SPECIMEN: NORMAL
LVEF ECHO: NORMAL
MCH RBC QN AUTO: 29.5 PG (ref 26.5–33)
MCHC RBC AUTO-ENTMCNC: 30.9 G/DL (ref 31.5–36.5)
MCV RBC AUTO: 95 FL (ref 78–100)
PLATELET # BLD AUTO: 191 10E3/UL (ref 150–450)
POTASSIUM SERPL-SCNC: 4 MMOL/L (ref 3.4–5.3)
POTASSIUM SERPL-SCNC: 5.2 MMOL/L (ref 3.4–5.3)
PROCALCITONIN SERPL IA-MCNC: 0.08 NG/ML
RBC # BLD AUTO: 3.9 10E6/UL (ref 3.8–5.2)
SODIUM SERPL-SCNC: 142 MMOL/L (ref 135–145)
SODIUM SERPL-SCNC: 143 MMOL/L (ref 135–145)
WBC # BLD AUTO: 8 10E3/UL (ref 4–11)

## 2024-05-10 PROCEDURE — 250N000013 HC RX MED GY IP 250 OP 250 PS 637: Performed by: FAMILY MEDICINE

## 2024-05-10 PROCEDURE — 258N000003 HC RX IP 258 OP 636: Performed by: FAMILY MEDICINE

## 2024-05-10 PROCEDURE — 250N000013 HC RX MED GY IP 250 OP 250 PS 637: Performed by: INTERNAL MEDICINE

## 2024-05-10 PROCEDURE — 258N000003 HC RX IP 258 OP 636: Performed by: INTERNAL MEDICINE

## 2024-05-10 PROCEDURE — 200N000001 HC R&B ICU

## 2024-05-10 PROCEDURE — 93306 TTE W/DOPPLER COMPLETE: CPT

## 2024-05-10 PROCEDURE — 84145 PROCALCITONIN (PCT): CPT | Performed by: FAMILY MEDICINE

## 2024-05-10 PROCEDURE — 82374 ASSAY BLOOD CARBON DIOXIDE: CPT | Performed by: FAMILY MEDICINE

## 2024-05-10 PROCEDURE — 250N000011 HC RX IP 250 OP 636: Performed by: INTERNAL MEDICINE

## 2024-05-10 PROCEDURE — 250N000011 HC RX IP 250 OP 636: Mod: JZ | Performed by: FAMILY MEDICINE

## 2024-05-10 PROCEDURE — 250N000012 HC RX MED GY IP 250 OP 636 PS 637: Performed by: FAMILY MEDICINE

## 2024-05-10 PROCEDURE — 93306 TTE W/DOPPLER COMPLETE: CPT | Mod: 26 | Performed by: INTERNAL MEDICINE

## 2024-05-10 PROCEDURE — 85027 COMPLETE CBC AUTOMATED: CPT | Performed by: FAMILY MEDICINE

## 2024-05-10 PROCEDURE — 36416 COLLJ CAPILLARY BLOOD SPEC: CPT | Performed by: FAMILY MEDICINE

## 2024-05-10 PROCEDURE — 80048 BASIC METABOLIC PNL TOTAL CA: CPT | Performed by: INTERNAL MEDICINE

## 2024-05-10 PROCEDURE — 99233 SBSQ HOSP IP/OBS HIGH 50: CPT | Performed by: FAMILY MEDICINE

## 2024-05-10 PROCEDURE — 250N000011 HC RX IP 250 OP 636: Performed by: FAMILY MEDICINE

## 2024-05-10 PROCEDURE — 36415 COLL VENOUS BLD VENIPUNCTURE: CPT | Performed by: INTERNAL MEDICINE

## 2024-05-10 RX ORDER — AMIODARONE HYDROCHLORIDE 100 MG/1
200 TABLET ORAL DAILY
Status: DISCONTINUED | OUTPATIENT
Start: 2024-05-11 | End: 2024-05-13

## 2024-05-10 RX ADMIN — ACETAMINOPHEN 1000 MG: 500 TABLET ORAL at 09:14

## 2024-05-10 RX ADMIN — SODIUM CHLORIDE: 9 INJECTION, SOLUTION INTRAVENOUS at 02:52

## 2024-05-10 RX ADMIN — AMIODARONE HYDROCHLORIDE 0.5 MG/MIN: 50 INJECTION, SOLUTION INTRAVENOUS at 20:57

## 2024-05-10 RX ADMIN — ACETAMINOPHEN 1000 MG: 500 TABLET ORAL at 20:41

## 2024-05-10 RX ADMIN — ATORVASTATIN CALCIUM 40 MG: 40 TABLET, FILM COATED ORAL at 09:14

## 2024-05-10 RX ADMIN — PREDNISONE 5 MG: 5 TABLET ORAL at 09:13

## 2024-05-10 RX ADMIN — HEPARIN SODIUM 5000 UNITS: 10000 INJECTION, SOLUTION INTRAVENOUS; SUBCUTANEOUS at 05:56

## 2024-05-10 RX ADMIN — ASPIRIN 81 MG: 81 TABLET, COATED ORAL at 09:13

## 2024-05-10 RX ADMIN — CETIRIZINE HYDROCHLORIDE 10 MG: 10 TABLET, FILM COATED ORAL at 09:13

## 2024-05-10 RX ADMIN — CEFTRIAXONE SODIUM 1 G: 1 INJECTION, POWDER, FOR SOLUTION INTRAMUSCULAR; INTRAVENOUS at 05:58

## 2024-05-10 RX ADMIN — HEPARIN SODIUM 5000 UNITS: 10000 INJECTION, SOLUTION INTRAVENOUS; SUBCUTANEOUS at 14:25

## 2024-05-10 RX ADMIN — Medication 1 TABLET: at 09:13

## 2024-05-10 RX ADMIN — CYCLOBENZAPRINE HYDROCHLORIDE 5 MG: 5 TABLET, FILM COATED ORAL at 01:13

## 2024-05-10 RX ADMIN — SODIUM CHLORIDE 250 ML: 9 INJECTION, SOLUTION INTRAVENOUS at 10:52

## 2024-05-10 RX ADMIN — ACETAMINOPHEN 1000 MG: 500 TABLET ORAL at 14:25

## 2024-05-10 ASSESSMENT — ACTIVITIES OF DAILY LIVING (ADL)
ADLS_ACUITY_SCORE: 54
ADLS_ACUITY_SCORE: 56
ADLS_ACUITY_SCORE: 54
ADLS_ACUITY_SCORE: 54
ADLS_ACUITY_SCORE: 56
ADLS_ACUITY_SCORE: 56
ADLS_ACUITY_SCORE: 54
ADLS_ACUITY_SCORE: 56
ADLS_ACUITY_SCORE: 56
ADLS_ACUITY_SCORE: 54
ADLS_ACUITY_SCORE: 54
ADLS_ACUITY_SCORE: 56
ADLS_ACUITY_SCORE: 54
ADLS_ACUITY_SCORE: 56
ADLS_ACUITY_SCORE: 54
ADLS_ACUITY_SCORE: 56
ADLS_ACUITY_SCORE: 54
ADLS_ACUITY_SCORE: 56
ADLS_ACUITY_SCORE: 54

## 2024-05-10 NOTE — PROGRESS NOTES
[facility]    Medicine Progress Note - Hospitalist Service    Date of Admission:  5/8/2024    Assessment & Plan     Jayashree Carson is a 73 year old female with past medical history of atrial fibrillation status post Watchman procedure, CVA with left-sided weakness, CHF of unknown type on daily diuretics and Entresto, hypertension, COPD, pulmonary hypertension, and morbid obesity was transferred from [facility] to [facility] ED via EMS due to increased confusion and generalized fatigue for past few days.   On presentation to the ED patient's vital signs were significant for low blood pressure and she was found to have an acute kidney injury with creatinine of 3.12.  UA was concerning for possible UTI and patient was started on Rocephin.  A fib with RVR was noted with HR in the 140-150 range.  She was given 2 L of fluids with transient improvement of blood pressure and heart rate but return of hypotension and HR in the 150s and decision was made to admit patient to ICU for ongoing management.     Since admission, patient has received another 1.75 L fluid bolus (in 250-500 mL increments) for recurrent hypotension that occurs.  HR failed to improve with PO metoprolol with worsening of blood pressure noted and patient was transitioned to amiodarone bolus followed by continuous infusion on 5/9/24 with HR now in the 80-100s.  This morning blood pressures have once more become 70-80s systolic but MAPs remaining at goal range of 65 and above and amiodarone has now been discontinued due to this hypotension.  Renal function has improved greatly with creatinine down to 1.46 and potassium normalized.  Patient has been having adequate urinary output following fluid resuscitation.  Continues to be very somnolent but awakens to voice, denies pain or new symptoms but articulates that she is very tired and will fall asleep easily if conversation lags.      WBC has normalized,  patient's temperature was low last evening but improved with warm blankets, procalcitonin is low risk for systemic infection.  On review of [place] chart and [facility] patient has no records for review, however reports going to the [facility] system for her care.  Will evaluate why access to her [facility] records are not available at this time.  Will also get stat ECHO to evaluate heart function as assess volume status in patient who may need ongoing intermittent bolusing or consideration of vasopressor support.  Continue ICU management with nursing to inform if hypotension persists following amiodarone holding.      Principal Problem:    Arterial hypotension    SIRS    Assessment: Likely due to combination of volume depletion/dehydration and regular dose of metoprolol given to control rapid atrial fibrillation.  She has had overall improvement with initiation of amiodarone with improved rate control but blood pressures continue to mildly low normal to hypotensive.  Metoprolol effect from XL dose given yesterday morning anticipate to wear off as the day progresses.      Plan:   -Continue IV fluids as above and transiently holding of amiodarone now with consideration of addition 250 mg IV bolus if mild hypotension remains.    -Continue holding spironolactone, bumetanide, Entresto.  -Continue to hold metoprolol.  -if patient continues to have persistent hypotension beyond expected timeframe for metoprolol XL given would need to consider vasopressor support and evaluate for additional etiology   -continue Rocephin for UTI  -obtain ECHO today to evaluate current heart function status given CHF of unknown etiology  -attempt to access [facility] records for [facility] - unable to identify patient when a search is attempted even though it is known patient was seen there last month.      Active Problems:    Acute renal failure superimposed on stage 3a chronic kidney disease, unspecified acute renal failure type  (H)    Assessment: Acute renal failure likely due to combination of dehydration due to decreased PO intake and PTA medications. Renal function is rapidly improving with IV hydration: Cr 3.12 down down to 1.46 with urine output improving overall.      Plan:   -Continue holding spironolactone, bumetanide, Entresto.  -Continue IV fluids, increase rate from 50 ml to 75 ml/hr.  -Continue strict I/O monitoring  -Repeat BMP tomorrow morning       Atrial fibrillation with RVR (H)    Presence of Watchman left atrial appendage closure device    Assessment: Uncontrolled HR likely exacerbated by dehydration. She received her PTA dose of metoprolol at 4 am today. HR fast despite IV fluids. Patient was given Digoxin 125 mcg IV x1 without change noted.  Amiodarone bolus and drip started with HR decreased from 140-150s down to 80-100s but patient having intermittent hypotension and drip has been intermittently held overnight.    Plan:   -Hold amiodarone again now due to persistent hypotension and monitor for ipmrovement  -no further digoxin at this time.   -hold metoprolol at this time until blood pressures stabilize.    -echocardiogram today      Hyperkalemia    Assessment: Secondary to acute renal failure with initial potassium of 6.1 and improved following kayexalate and IV fluids.  Potassium has now normalized as creatinine is improving towards previous normal.    Plan:   -continue to monitor for ongoing resolution       Asymptomatic bacteriuria    Assessment: Abnormal UA but unknown if patient had been having symptoms recently - was having hesitancy and decreased output but unclear if from infection or worsening NIC.  Procalcitonin low x 2: 0.27, 0.14. Rapidly improving leukocytosis.  Two species of Klebsiella now growing out on UC in patient with persistent hypotension     Plan: Continue ceftriaxone for now due to persistent hypotension and possible infection       Increased anion gap metabolic acidosis    Assessment:  Secondary to acute renal failure. Slowly improving as renal function improves with anion gap now normalized but bicarb is still low.      Plan:   -Continue IV fluids as above.  -BMP and VBG tomorrow to monitor for ongoing resolution       CHF - unknown type, unknown severity    Assessment:  patient has documented history of CHF but she is unaware of what type or severity.  On paperwork from [facility] chronic diastolic CHF is listed the patient is on loop diuretic, potassium sparing diuretic and Entresto which is typically seen more than systolic heart failure management    Plan:    -Proceed with echocardiogram today for further evaluation  -attempt to obtain records from [facility] where patient normal gets her care - unable to access through [facility].       Essential hypertension    Assessment:  present at baseline with patient on Bumex, metoprolol XL, Entresto and spironolactone at baseline.  Presenting with persistent hypotension as above and home regimen is being held.     Plan:    -continue to hold home regimen for now and monitor for hypotension resolution       History of CVA with residual left sided weakness    Assessment:  previous history, on ASA daily     Plan:    -continue ASA during this stay  -continue supportive cares       COPD    Assessment:  previous history with patient on prednisone 5 mg daily and uses prn albuterol for breakthrough symptoms    Plan:    -continue prednisone 5 mg for now but consider stress dose steroids if hypotension persists      Pulmonary hypertension    Assessment:  previous history per LTC facility records but patient and family unaware of history, not on oxygen at baseline and no on any medications     Plan:    -proceed with echo as above for re-evaluation           Diet: Combination Diet Low Saturated Fat Na <2400mg Diet, No Caffeine Diet  Snacks/Supplements Adult: Ensure Enlive; With Meals    DVT Prophylaxis: Heparin SQ  Miramontes Catheter: Not  "present  Lines: PRESENT             Cardiac Monitoring: ACTIVE order. Indication: Tachyarrhythmias, acute (48 hours)  Code Status: Full Code      Clinically Significant Risk Factors        # Hyperkalemia: Highest K = 6.1 mmol/L in last 2 days, will monitor as appropriate   # Hypocalcemia: Lowest Ca = 7.9 mg/dL in last 2 days, will monitor and replace as appropriate    # Anion Gap Metabolic Acidosis: Highest Anion Gap = 21 mmol/L in last 2 days, will monitor and treat as appropriate             # Severe Obesity: Estimated body mass index is 45.36 kg/m  as calculated from the following:    Height as of this encounter: 1.499 m (4' 11\").    Weight as of this encounter: 101.9 kg (224 lb 9.6 oz)., PRESENT ON ADMISSION     # Financial/Environmental Concerns: none         Disposition Plan     Medically Ready for Discharge: Anticipated in 2-4 Days             Mariah Cuevas MD  Hospitalist Service  [facility]  Securely message with [employer] (more info)  Text page via [employer] Paging/Directory   ______________________________________________________________________    Interval History   Patient overall improved but with ongoing intermittent hypotension present - was able to improve with amiodarone drip discontinuation and patient did not need any further fluid bolusing overnight.  Still very sleepy but awakens easily to voice and with maintain wakefulness during the conversation.  Denies any symptoms other than sleepiness.     Physical Exam   Vital Signs: Temp: 97.9  F (36.6  C) Temp src: Rectal BP: (!) 85/57 Pulse: 85   Resp: 10 SpO2: 97 % O2 Device: None (Room air)    Weight: 224 lbs 9.6 oz    Constitutional: sleeping on entry, awakens to voice but falls asleep easily if not being spoken to, cooperative, vague on details   Respiratory: No increased work of breathing, good air exchange, clear to auscultation bilaterally, no crackles or wheezing  Cardiovascular: irregularly irregular " rhythm in the 90s currently   GI: bowel sounds present, abdomen obese but soft and non-distended   Musculoskeletal: no lower extremity pitting edema present  Neurologic: awake, oriented to person, place, month/date and somewhat to situation.  Vague on details of the past few days.     Medical Decision Making       47 MINUTES SPENT BY ME on the date of service performing ongoing critical care management, doing chart review, history, exam, documentation & further activities per the note.      Data     I have personally reviewed the following data over the past 24 hrs:    8.0  \   11.5 (L)   / 191     142 118 (H) 65.1 (H) /  111 (H)   5.2 14 (L) 1.20 (H) \     Procal: 0.08 CRP: N/A Lactic Acid: N/A         Imaging results reviewed over the past 24 hrs:   Recent Results (from the past 24 hour(s))   Echocardiogram Complete   Result Value    LVEF  60-65%    Narrative    [id number]  [id number]  [id number]  [id number]^DOUG^TAWNY^SOPHIA     [facility]  Echocardiography Laboratory  [address] [place] [place] [address]     Name: DARBY HOUSER  MRN: [id number]  : 1950  Study Date: 05/10/2024 07:50 AM  Age: 73 yrs  Gender: Female  Patient Location: [facility]  Reason For Study: CHF, Shock  History: ckd, afib  Ordering Physician: TAWNY CAMACHO  Performed By: Dodie Knight     BSA: 1.9 m2  Height: 59 in  Weight: 225 lb  HR: 86  BP: 85/57 mmHg  ______________________________________________________________________________  Procedure  Complete Portable Echo Adult.  ______________________________________________________________________________  Interpretation Summary     Left ventricular systolic function is normal. The visual ejection fraction is  60-65%. No regional wall motion abnormalities noted.  The right ventricle is moderately dilated. Moderately decreased right  ventricular systolic function.  There is severe biatrial enlargement.  There is moderate (2+) tricuspid  regurgitation.  The right ventricular systolic pressure is elevated at 49.1 mmHg + RA  pressure.  RA pressure estimated at 15 mmHg or greater.  The rhythm was rapid atrial fibrillation.  No prior study.  ______________________________________________________________________________  Left Ventricle  The left ventricle is normal in size. There is concentric remodeling present.  Diastolic function not assessed due to atrial fibrillation. Left ventricular  systolic function is normal. The visual ejection fraction is 60-65%. No  regional wall motion abnormalities noted.     Right Ventricle  The right ventricle is moderately dilated. Moderately decreased right  ventricular systolic function.     Atria  There is severe biatrial enlargement.     Mitral Valve  There is mild (1+) mitral regurgitation.     Tricuspid Valve  There is moderate (2+) tricuspid regurgitation. The right ventricular systolic  pressure is approximated at 49.1 mmHg plus the right atrial pressure.     Aortic Valve  The aortic valve is not well visualized. No aortic stenosis is present.     Pulmonic Valve  The pulmonic valve is not well visualized.     Vessels  Normal size aorta. Normal size ascending aorta. IVC diameter >2.1 cm  collapsing <50% with sniff suggests a high RA pressure estimated at 15 mmHg or  greater.     Rhythm  The rhythm was rapid atrial fibrillation.     ______________________________________________________________________________  MMode/2D Measurements & Calculations  IVSd: 1.4 cm  LVIDd: 3.6 cm  LVIDs: 2.7 cm  LVPWd: 1.3 cm  FS: 24.6 %     LV mass(C)d: 173.1 grams  LV mass(C)dI: 89.3 grams/m2  Ao root diam: 3.6 cm  LA dimension: 4.1 cm  asc Aorta Diam: 3.4 cm  LA/Ao: 1.1  Ao root diam index Ht(cm/m): 2.4  Ao root diam index BSA (cm/m2): 1.9  Asc Ao diam index BSA (cm/m2): 1.8  Asc Ao diam index Ht(cm/m): 2.3  EF Biplane: 86.7 %  LA Volume (BP): 51.2 ml     LA Volume Index (BP): 26.4 ml/m2  RWT: 0.74  TAPSE: 1.2 cm     Doppler  Measurements & Calculations  MV E max damaso: 107.0 cm/sec  MV dec time: 0.13 sec  Ao V2 max: 96.4 cm/sec  Ao max P.0 mmHg  LV V1 max P.0 mmHg  LV V1 max: 71.1 cm/sec  PA V2 max: 77.2 cm/sec  PA max P.4 mmHg  PA acc time: 0.13 sec  TR max damaso: 350.2 cm/sec  TR max P.1 mmHg  AV Damaso Ratio (DI): 0.74  Medial E/e': 16.1  RV S Damaso: 7.6 cm/sec     ______________________________________________________________________________  Report approved by: Laura Purvis 05/10/2024 11:16 AM

## 2024-05-10 NOTE — PLAN OF CARE
Goal Outcome Evaluation:      Plan of Care Reviewed With: patient, sibling, child    Overall Patient Progress: no changeOverall Patient Progress: no change    Outcome Evaluation: Pt lethargic this morning but more alert this afternoon, when awake very frustrated with being in hospital. A & O x 3, disoriented to situation. Denies any pain this shift, scheduled tylenol given per MAR. Blood pressures in 70s-80s systolic today. Amiodarone drip stopped at 0830 this morning, provider notified. This afternoon blood pressure up to 100s systolic, amiodarone drip restarted about 1345. HR in 80s to 110s. Pt refuses any repositioning or pillows. Takes pulse oximeter and blood pressure cuff off at times. Refused breakfast this morning, family at bedside for lunch encouraging intake. Pt had small amount of cottage cheese and few bites of fruit. 250 mL bolus given this shift x 1 and IV fluids continue at 75 mL/hr, only 300 mL urine out put today. Bladder scanned for 195 this afternoon, then patient voided another 100 mL so 400mL total urine output this shift.

## 2024-05-10 NOTE — PLAN OF CARE
Goal Outcome Evaluation:      Plan of Care Reviewed With: patient    Overall Patient Progress: no change    Outcome Evaluation: Amiodarone drip stopped at 0140 after consulting with tele-ICU, SBP frequently below 90. Patient's remained hypotensive with pressures as low as 70s/50s MAPs intermittently between 60-65. Follow up with Tele-ICU at 0500 as Amiodarone drip had not been able to be resumed due to inconsistent MAPS and BP readings. Verbal order to resume Amio, restarted at 0520 after two BP readings with SBP of 103 and 102 and MAP > 65.   Potassium recheck 4.3 after kayexalate.   Rectal temps 97.9, unable to obtain oral temp.   Patient intermittently confused as to where she is. Poor insight, asking for monitors to be removed repeatedly even after explanations provided.   Anxious, frustrated, restless, cries out in pain with repositioning due to 'back spasms' that radiate to legs. She states her pain is at baseline. Oxycodone x1 and Flexeril x1. She reports that she typically uses a kailee steady at LTC facility.   Lung sounds are clear, diminished, patient denies shortness of breath. No supplemental oxygen needs.   Falls precautions maintained.

## 2024-05-11 LAB
ALBUMIN SERPL BCG-MCNC: 3.1 G/DL (ref 3.5–5.2)
ALP SERPL-CCNC: 109 U/L (ref 40–150)
ALT SERPL W P-5'-P-CCNC: 42 U/L (ref 0–50)
ANION GAP SERPL CALCULATED.3IONS-SCNC: 12 MMOL/L (ref 7–15)
AST SERPL W P-5'-P-CCNC: 35 U/L (ref 0–45)
BACTERIA UR CULT: ABNORMAL
BACTERIA UR CULT: ABNORMAL
BASE EXCESS BLDV CALC-SCNC: -8 MMOL/L (ref -3–3)
BILIRUB SERPL-MCNC: 0.4 MG/DL
BUN SERPL-MCNC: 48.5 MG/DL (ref 8–23)
CALCIUM SERPL-MCNC: 8.3 MG/DL (ref 8.8–10.2)
CHLORIDE SERPL-SCNC: 117 MMOL/L (ref 98–107)
CREAT SERPL-MCNC: 1.09 MG/DL (ref 0.51–0.95)
DEPRECATED HCO3 PLAS-SCNC: 14 MMOL/L (ref 22–29)
EGFRCR SERPLBLD CKD-EPI 2021: 53 ML/MIN/1.73M2
ERYTHROCYTE [DISTWIDTH] IN BLOOD BY AUTOMATED COUNT: 15.1 % (ref 10–15)
GLUCOSE SERPL-MCNC: 85 MG/DL (ref 70–99)
HCO3 BLDV-SCNC: 17 MMOL/L (ref 21–28)
HCT VFR BLD AUTO: 36.7 % (ref 35–47)
HGB BLD-MCNC: 11.3 G/DL (ref 11.7–15.7)
MCH RBC QN AUTO: 29.5 PG (ref 26.5–33)
MCHC RBC AUTO-ENTMCNC: 30.8 G/DL (ref 31.5–36.5)
MCV RBC AUTO: 96 FL (ref 78–100)
NT-PROBNP SERPL-MCNC: 9007 PG/ML (ref 0–900)
O2/TOTAL GAS SETTING VFR VENT: 21 %
OXYHGB MFR BLDV: 81 % (ref 70–75)
PCO2 BLDV: 32 MM HG (ref 40–50)
PH BLDV: 7.33 [PH] (ref 7.32–7.43)
PLATELET # BLD AUTO: 206 10E3/UL (ref 150–450)
PO2 BLDV: 46 MM HG (ref 25–47)
POTASSIUM SERPL-SCNC: 3.9 MMOL/L (ref 3.4–5.3)
PROT SERPL-MCNC: 5.6 G/DL (ref 6.4–8.3)
RBC # BLD AUTO: 3.83 10E6/UL (ref 3.8–5.2)
SAO2 % BLDV: 82 % (ref 70–75)
SODIUM SERPL-SCNC: 143 MMOL/L (ref 135–145)
WBC # BLD AUTO: 9.2 10E3/UL (ref 4–11)

## 2024-05-11 PROCEDURE — 82805 BLOOD GASES W/O2 SATURATION: CPT | Performed by: FAMILY MEDICINE

## 2024-05-11 PROCEDURE — 258N000003 HC RX IP 258 OP 636: Performed by: INTERNAL MEDICINE

## 2024-05-11 PROCEDURE — 250N000009 HC RX 250: Performed by: INTERNAL MEDICINE

## 2024-05-11 PROCEDURE — 80053 COMPREHEN METABOLIC PANEL: CPT | Performed by: FAMILY MEDICINE

## 2024-05-11 PROCEDURE — 83880 ASSAY OF NATRIURETIC PEPTIDE: CPT | Performed by: FAMILY MEDICINE

## 2024-05-11 PROCEDURE — 200N000001 HC R&B ICU

## 2024-05-11 PROCEDURE — 99233 SBSQ HOSP IP/OBS HIGH 50: CPT | Performed by: FAMILY MEDICINE

## 2024-05-11 PROCEDURE — 250N000013 HC RX MED GY IP 250 OP 250 PS 637: Performed by: INTERNAL MEDICINE

## 2024-05-11 PROCEDURE — 250N000011 HC RX IP 250 OP 636: Performed by: INTERNAL MEDICINE

## 2024-05-11 PROCEDURE — 85027 COMPLETE CBC AUTOMATED: CPT | Performed by: FAMILY MEDICINE

## 2024-05-11 PROCEDURE — 250N000013 HC RX MED GY IP 250 OP 250 PS 637: Performed by: FAMILY MEDICINE

## 2024-05-11 PROCEDURE — 250N000011 HC RX IP 250 OP 636: Performed by: FAMILY MEDICINE

## 2024-05-11 PROCEDURE — 250N000012 HC RX MED GY IP 250 OP 636 PS 637: Performed by: FAMILY MEDICINE

## 2024-05-11 PROCEDURE — 36415 COLL VENOUS BLD VENIPUNCTURE: CPT | Performed by: FAMILY MEDICINE

## 2024-05-11 PROCEDURE — 250N000009 HC RX 250: Performed by: FAMILY MEDICINE

## 2024-05-11 RX ORDER — METOPROLOL TARTRATE 1 MG/ML
2.5 INJECTION, SOLUTION INTRAVENOUS ONCE
Status: COMPLETED | OUTPATIENT
Start: 2024-05-11 | End: 2024-05-11

## 2024-05-11 RX ORDER — TIZANIDINE 2 MG/1
2 TABLET ORAL EVERY 6 HOURS PRN
Status: DISCONTINUED | OUTPATIENT
Start: 2024-05-11 | End: 2024-05-18 | Stop reason: HOSPADM

## 2024-05-11 RX ORDER — METOPROLOL TARTRATE 25 MG/1
25 TABLET, FILM COATED ORAL 2 TIMES DAILY
Status: DISCONTINUED | OUTPATIENT
Start: 2024-05-11 | End: 2024-05-11

## 2024-05-11 RX ORDER — METOPROLOL TARTRATE 1 MG/ML
5 INJECTION, SOLUTION INTRAVENOUS ONCE
Status: COMPLETED | OUTPATIENT
Start: 2024-05-11 | End: 2024-05-11

## 2024-05-11 RX ORDER — ENOXAPARIN SODIUM 100 MG/ML
40 INJECTION SUBCUTANEOUS EVERY 12 HOURS
Status: DISCONTINUED | OUTPATIENT
Start: 2024-05-11 | End: 2024-05-16

## 2024-05-11 RX ADMIN — METOPROLOL TARTRATE 2.5 MG: 1 INJECTION, SOLUTION INTRAVENOUS at 08:22

## 2024-05-11 RX ADMIN — CYCLOBENZAPRINE HYDROCHLORIDE 5 MG: 5 TABLET, FILM COATED ORAL at 05:00

## 2024-05-11 RX ADMIN — AMIODARONE HYDROCHLORIDE 200 MG: 100 TABLET ORAL at 08:24

## 2024-05-11 RX ADMIN — METOPROLOL TARTRATE 5 MG: 1 INJECTION, SOLUTION INTRAVENOUS at 13:26

## 2024-05-11 RX ADMIN — ASPIRIN 81 MG: 81 TABLET, COATED ORAL at 08:24

## 2024-05-11 RX ADMIN — SODIUM CHLORIDE: 9 INJECTION, SOLUTION INTRAVENOUS at 04:50

## 2024-05-11 RX ADMIN — ATORVASTATIN CALCIUM 40 MG: 40 TABLET, FILM COATED ORAL at 08:24

## 2024-05-11 RX ADMIN — CEFTRIAXONE SODIUM 1 G: 1 INJECTION, POWDER, FOR SOLUTION INTRAMUSCULAR; INTRAVENOUS at 05:00

## 2024-05-11 RX ADMIN — METOPROLOL TARTRATE 12.5 MG: 25 TABLET, FILM COATED ORAL at 21:21

## 2024-05-11 RX ADMIN — OXYCODONE HYDROCHLORIDE 2.5 MG: 5 TABLET ORAL at 00:19

## 2024-05-11 RX ADMIN — Medication 1 TABLET: at 08:24

## 2024-05-11 RX ADMIN — ACETAMINOPHEN 1000 MG: 500 TABLET ORAL at 08:23

## 2024-05-11 RX ADMIN — TIZANIDINE 2 MG: 2 TABLET ORAL at 13:25

## 2024-05-11 RX ADMIN — ACETAMINOPHEN 1000 MG: 500 TABLET ORAL at 13:25

## 2024-05-11 RX ADMIN — PREDNISONE 5 MG: 5 TABLET ORAL at 08:24

## 2024-05-11 ASSESSMENT — ACTIVITIES OF DAILY LIVING (ADL)
ADLS_ACUITY_SCORE: 56

## 2024-05-11 NOTE — PROGRESS NOTES
[facility]    Medicine Progress Note - Hospitalist Service    Date of Admission:  5/8/2024    Assessment & Plan     Jayashree Carson is a 73 year old female with past medical history of atrial fibrillation status post Watchman procedure, CVA with left-sided weakness, chronic diastolic CHF, hypertension, COPD, pulmonary hypertension with moderate right sided heart failure, and morbid obesity was transferred from [facility] to [facility] via EMS due to increased confusion and generalized fatigue for past few days.   On presentation to the ED patient's vital signs were significant for persistent hypotension and she was found to have an acute kidney injury with creatinine of 3.12.  UA was concerning for possible UTI and patient was started on Rocephin.  A fib with RVR was noted with HR in the 140-150 range.  She was given 2 L of fluids with transient improvement of blood pressure and heart rate but return of hypotension and HR in the 150s and decision was made to admit patient to ICU for ongoing management.     On arrival to the ICU, HR failed to improve with PO metoprolol XL with worsening of blood pressure noted and persistent hypotension once more developing and patient was transitioned to amiodarone bolus and subsequent  infusion on 5/9/24 with HR now in the 80-100s.  Blood pressures remained low, thought secondary to beta blocker effect and ongoing dehydration and patient received another 2 L fluid bolusing over the next 24 hours for blood pressures support and hydration.  Patient did not require vasopressor initiation.      Blood pressures have now improved with MAPs consistently over 65 for the past 12 hours and amiodarone infusion is complete with HR in the 80-140s (mainly 100-110s).      Plan for today:  -Will attempt metoprolol 2.5 mg IV x1 now and monitor closely for blood pressure tolerance and effect on HR.  If well tolerated by blood pressure but  heart rate not well controlled will increase to 5 mg IV this afternoon in hopes of restarting oral short acting metoprolol this evening for ongoing close monitoring.   - continue with transition to PO amiodarone 200 mg daily this morning.  - saline lock IV fluids but continue to hold diuretics at this time.    -Will change flexeril to Zanaflex which hopefully will be less sedating but improve muscle spasm pain.  Could consider transition to baclofen in the future if symptoms still bothersome.  -anticipate transfer to med/surg floor later today vs tomorrow depending on HR and blood pressure tolerance of above plan  -will discuss with patient and family patient's current mood and attitude and wishes for this stay as well as goals going forward once family arrives later today        Principal Problem:    Arterial hypotension    SIRS    Assessment: Likely due to combination of volume depletion/dehydration and a fib with RVR initially and continued even after initial corrective efforts likely secondary to regular dose of metoprolol XL given to control rapid atrial fibrillation early in this hospital course.  She has had overall improvement with initiation of amiodarone with improved rate control but still above goal this morning    Plan:   -transition to PO amiodarone 200 mg daily  -give IV metoprolol 2.5 mg now and monitor for HR and blood pressure response.   -Continue holding spironolactone, bumetanide, Entresto.  -Continue to hold PO metoprolol but hope to restart this evening.  -continue Rocephin for UTI    Active Problems:    Acute renal failure superimposed on stage 3a chronic kidney disease, unspecified acute renal failure type (H)    Assessment: Acute renal failure likely due to combination of dehydration due to decreased PO intake and PTA medications. Renal function is rapidly improving with IV hydration: Cr 3.12 down down to 1.20 with urine output improving overall.      Plan:   -Continue holding  spironolactone, bumetanide, Entresto.  -will saline lock IV fluids to avoid fluid overload development now that blood pressures have stabilized  -Continue strict I/O monitoring  -Repeat BMP tomorrow morning       Atrial fibrillation with RVR (H)    Presence of Watchman left atrial appendage closure device    Assessment: Uncontrolled HR likely exacerbated by dehydration. She received her PTA dose of metoprolol at 4 am today. HR fast despite IV fluids. Patient was given Digoxin 125 mcg IV x1 without change noted.  Amiodarone bolus and drip started with HR decreased from 140-150s down to  on average as infusion is complete    Plan:   -transition to amiodarone 200 mg PO daily  -Give IV metoprolol today and monitor closely for HR and blood pressure effect with hopes of starting short acting oral metoprolol this evening if able.   -no further digoxin at this time.       Hyperkalemia    Assessment: Secondary to acute renal failure with initial potassium of 6.1 and improved following kayexalate and IV fluids.  Potassium has now normalized as creatinine is improving towards previous normal.    Plan:   -continue to monitor for ongoing resolution       Bacteriuria - uncertain if symptomatic or asymptomatic in patient is vague recall    Assessment: Abnormal UA but unknown if patient had been having symptoms recently - was having hesitancy and decreased output but unclear if from infection or worsening NIC.  Procalcitonin low x 2: 0.27, 0.14. Rapidly improving leukocytosis.  Two species of Klebsiella now growing out on UC in patient with persistent hypotension     Plan: Continue ceftriaxone for now due to persistent hypotension and possible infection but will be short antibiotic course       Increased anion gap metabolic acidosis    Assessment: Secondary to acute renal failure. Slowly improving as renal function improves with anion gap now normalized but bicarb is still low and compensated metabolic acidosis present on  VBG    Plan:   -saline lock IV fluids today  -BMP and VBG tomorrow to monitor for ongoing resolution       Chronic diastolic congestive heart failure    Chronic right sided systolic heart failure    Assessment:  previous history and noted on echo performed during this hospitalization. Patient is normally on Bumex, spironolactone, and Entresto, all of which have been held since this admission due to persistent hypotension, NIC and dehydration     Plan:    -continue to hold home regimen  -saline lock IV fluids today due to improvement in renal function and stability in blood pressures  -monitor for signs of volume overload going forward.       Essential hypertension    Assessment:  present at baseline with patient on Bumex, metoprolol XL, Entresto and spironolactone at baseline.  Presenting with persistent hypotension as above and home regimen is being held.     Plan:    -continue to hold home regimen for now and monitor for hypotension resolution       History of CVA with residual left sided weakness    Assessment:  previous history, on ASA daily     Plan:    -continue ASA during this stay  -continue supportive cares       COPD    Assessment:  previous history with patient on prednisone 5 mg daily and uses prn albuterol for breakthrough symptoms    Plan:    -continue prednisone 5 mg for now but consider stress dose steroids if hypotension persists      Pulmonary hypertension    Assessment:  previous history per LTC facility records but patient and family unaware of history, not on oxygen at baseline and no on any medications     Plan:    -proceed with echo as above for re-evaluation           Diet: Snacks/Supplements Adult: Ensure Enlive; With Meals  Combination Diet Low Saturated Fat Na <2400mg Diet, No Caffeine Diet    DVT Prophylaxis: Lovenox  Miramontes Catheter: Not present  Lines: PRESENT             Cardiac Monitoring: ACTIVE order. Indication: Tachyarrhythmias, acute (48 hours)  Code Status: Full Code   "    Clinically Significant Risk Factors        # Hyperkalemia: Highest K = 5.8 mmol/L in last 2 days, will monitor as appropriate       # Hypoalbuminemia: Lowest albumin = 3.1 g/dL at 5/11/2024  5:18 AM, will monitor as appropriate     # Hypertension: Noted on problem list  # Heart failure with preserved ejection fraction: EF >50% and home medication list includes sacubitril/valsartan (Entresto)       # Severe Obesity: Estimated body mass index is 45.16 kg/m  as calculated from the following:    Height as of this encounter: 1.499 m (4' 11\").    Weight as of this encounter: 101.4 kg (223 lb 9.6 oz)., PRESENT ON ADMISSION     # Financial/Environmental Concerns: none         Disposition Plan     Medically Ready for Discharge: Anticipated in 2-4 Days             Mariah Cuevas MD  Hospitalist Service  [facility]  Securely message with xiao qu wu you (more info)  Text page via [facility] Paging/Directory   ______________________________________________________________________    Interval History   Patient has become more awake last evening and overnight - has been very focal in her displeasure of her current quality of life since her stroke and her need for frequent hospitalizations and inability to care for herself.  She currently denies pain, denies lightheadedness/dizziness, denies concerns.  She vocalizes a desire to leave the hospital but is unsure why she is here or why she continues to need hospitalization and is not open to discussion of why ongoing hospitalization is recommended.  HR has been in the 90-120s overall but sometimes will transiently increase into the 130-140s, especially when awake and frustrated.  MAPs have been consistently over 65 overnight and amiodarone drip did not have to be held.  Continues on RA with other vitals stable and normal.  No new nursing concerns.     Physical Exam   Vital Signs: Temp: 98.2  F (36.8  C) Temp src: Oral BP: 102/58 Pulse: 111   Resp: 14 " SpO2: 96 % O2 Device: None (Room air)    Weight: 223 lbs 9.6 oz    Constitutional: awake, alert, frustrated and vocal in her displeasure this morning, does not assist with exam but doesn't refuse it, no apparent distress, and appears stated age  Respiratory: No increased work of breathing, decreased air exchange, clear to auscultation bilaterally, no crackles or wheezing  Cardiovascular: irregularly irregular rhythm - was in the 100-110 range on entry into the room but does increase into the 120-140 range intermittently during this visit   GI: bowel sounds present in all 4 quadrants, abdomen obese but soft and non-tender   Musculoskeletal: ongoing flexure and rigidity of the left arm and leg, no edema present in bilateral lower legs   Neurologic: awake to person, place, month/year but not day or date, not sure why she is in the hospital or what has happened over the past few days but is resistant to learning more about it this morning     Medical Decision Making       55 MINUTES SPENT BY ME on the date of service doing chart review, history, exam, documentation & further activities per the note.      Data     I have personally reviewed the following data over the past 24 hrs:    9.2  \   11.3 (L)   / 206     143 117 (H) 48.5 (H) /  85   3.9 14 (L) 1.09 (H) \     ALT: 42 AST: 35 AP: 109 TBILI: 0.4   ALB: 3.1 (L) TOT PROTEIN: 5.6 (L) LIPASE: N/A     Trop: N/A BNP: 9,007 (H)     Procal: 0.08 CRP: N/A Lactic Acid: N/A

## 2024-05-11 NOTE — PROGRESS NOTES
"Blood pressure cuff began inflating for 1530 check. Pt. Took blood pressure cuff off. Writer entered room to attempt to put blood pressure cuff back in place. Pt. Repeatedly yelled \"get me out of here\" while writer provided education as to why we are checking blood pressure and the importance of it. Pt. Continued to yell at writer \"go get in your funny car and get me out of here.\" At this time blood pressure cuff was left off. Will continue to reassess.     MD and charge nurse updated.   "

## 2024-05-11 NOTE — PLAN OF CARE
Goal Outcome Evaluation:    Plan of Care Reviewed With: patient    Overall Patient Progress: improving    Outcome Evaluation: Pt is alert and oriented to self, place, and time. Disoriented to situation at times. Pt reported back pain, given roxicodone and flexiril with some improvement. Tele shows a-fib with HR 90-100s. Amio gtt was stopped at 0500. BPs better, MAPS over 65. On room air with O2 sats 96%. Pt appeared frustrated overnight. Asked pt why and pt stated that she's been in and out of hopitals since her stroke and is not getting better. Pt stated that she does not do any physical therapy at [facility] as she cannot afford it. Repositioning pt q2h. Good urine output via the purewick.     14.7

## 2024-05-11 NOTE — PLAN OF CARE
Goal Outcome Evaluation:      Plan of Care Reviewed With: patient, child    Overall Patient Progress: no changeOverall Patient Progress: no change    Outcome Evaluation: Pt. A&Ox 4. Tele shows a-fib with rate of 70's-90's this evening following transition to oral amio and IV metoprolol doses. MAPs did go under 65 briefly following second dose of IV metoprolol, improved with time. Pt. continues to refuse cares and medications this shift, education provided. See previous notes for details. Poor appetite continues; refused breakfast, ate fruit cup and milk for lunch, refused supper. Offered alternatives, continued refusal. Small amounts of water this shift. Purwick in place, 400 cc output this shift. Administered PRN Zanaflex for muscle spasms, reports little relief but declines all other interventions offered.

## 2024-05-11 NOTE — PROGRESS NOTES
"Daughter, Morelia, called nursing desk for an update. States she received an \"odd\" phone call from patient stating \"I hope that you're having fun at the [facility]\" and then patient hung up the conversation. Daughter attempted to call back and patient declined phone call. Daughter updated regarding blood pressure cuff and patient refusing to put back on. Daughter report she will try reaching out to patient again later and will be here to visit tomorrow.   "

## 2024-05-11 NOTE — PLAN OF CARE
"Goal Outcome Evaluation:      Plan of Care Reviewed With: patient          Outcome Evaluation: Patient seems to sleep during the day and become more alert/interactive around supper time. Mood is flat and she appears very depressed. Pt will often decline assistance with anything unless insisted upon by staff. At times she seems irritable but it appears to be more out of frustration and her inability to do things for herself. Temp has been WNL today. HR remains in the 80's-100's. Amiodorone continues until 0500 and will switch to oral tomorrow. BP improved. She has declined anything for pain and seems to tolerate repositioning better today. Pt refused evening heparin tonight. Bites for supper again tonight. Offered pt ice cream at HS which she reluctantly accepted. She quickly became discouraged when she wasn't able to hold the container with her left hand. \"Oh just forget it!\" Writer offered to help. Pt stated \"And be fed like a little baby?!! I'm not doing that!\". Writer held the container while pt scooped the ice cream out. She was very upset that she wasn't able to do it herself and was just going to refuse to eat instead of accept help.      "

## 2024-05-12 LAB
ANION GAP SERPL CALCULATED.3IONS-SCNC: 12 MMOL/L (ref 7–15)
BASE EXCESS BLDV CALC-SCNC: -6.5 MMOL/L (ref -3–3)
BUN SERPL-MCNC: 32.4 MG/DL (ref 8–23)
CALCIUM SERPL-MCNC: 8.8 MG/DL (ref 8.8–10.2)
CHLORIDE SERPL-SCNC: 118 MMOL/L (ref 98–107)
CREAT SERPL-MCNC: 0.96 MG/DL (ref 0.51–0.95)
DEPRECATED HCO3 PLAS-SCNC: 13 MMOL/L (ref 22–29)
EGFRCR SERPLBLD CKD-EPI 2021: 61 ML/MIN/1.73M2
ERYTHROCYTE [DISTWIDTH] IN BLOOD BY AUTOMATED COUNT: 15.3 % (ref 10–15)
GLUCOSE SERPL-MCNC: 77 MG/DL (ref 70–99)
HCO3 BLDV-SCNC: 19 MMOL/L (ref 21–28)
HCT VFR BLD AUTO: 41.3 % (ref 35–47)
HGB BLD-MCNC: 12.4 G/DL (ref 11.7–15.7)
MCH RBC QN AUTO: 29.8 PG (ref 26.5–33)
MCHC RBC AUTO-ENTMCNC: 30 G/DL (ref 31.5–36.5)
MCV RBC AUTO: 99 FL (ref 78–100)
O2/TOTAL GAS SETTING VFR VENT: 21 %
OXYHGB MFR BLDV: 34 % (ref 70–75)
PCO2 BLDV: 39 MM HG (ref 40–50)
PH BLDV: 7.3 [PH] (ref 7.32–7.43)
PLATELET # BLD AUTO: 200 10E3/UL (ref 150–450)
PO2 BLDV: 23 MM HG (ref 25–47)
POTASSIUM SERPL-SCNC: 4 MMOL/L (ref 3.4–5.3)
RBC # BLD AUTO: 4.16 10E6/UL (ref 3.8–5.2)
SAO2 % BLDV: 34.9 % (ref 70–75)
SODIUM SERPL-SCNC: 143 MMOL/L (ref 135–145)
WBC # BLD AUTO: 10 10E3/UL (ref 4–11)

## 2024-05-12 PROCEDURE — 250N000013 HC RX MED GY IP 250 OP 250 PS 637: Performed by: FAMILY MEDICINE

## 2024-05-12 PROCEDURE — 250N000011 HC RX IP 250 OP 636: Performed by: FAMILY MEDICINE

## 2024-05-12 PROCEDURE — 250N000009 HC RX 250: Performed by: INTERNAL MEDICINE

## 2024-05-12 PROCEDURE — 80048 BASIC METABOLIC PNL TOTAL CA: CPT | Performed by: FAMILY MEDICINE

## 2024-05-12 PROCEDURE — 250N000009 HC RX 250: Performed by: FAMILY MEDICINE

## 2024-05-12 PROCEDURE — 120N000013 HC R&B IMCU

## 2024-05-12 PROCEDURE — 82805 BLOOD GASES W/O2 SATURATION: CPT | Performed by: FAMILY MEDICINE

## 2024-05-12 PROCEDURE — 250N000013 HC RX MED GY IP 250 OP 250 PS 637: Performed by: INTERNAL MEDICINE

## 2024-05-12 PROCEDURE — 99233 SBSQ HOSP IP/OBS HIGH 50: CPT | Performed by: FAMILY MEDICINE

## 2024-05-12 PROCEDURE — 250N000012 HC RX MED GY IP 250 OP 636 PS 637: Performed by: FAMILY MEDICINE

## 2024-05-12 PROCEDURE — 250N000011 HC RX IP 250 OP 636: Mod: JZ | Performed by: STUDENT IN AN ORGANIZED HEALTH CARE EDUCATION/TRAINING PROGRAM

## 2024-05-12 PROCEDURE — 36415 COLL VENOUS BLD VENIPUNCTURE: CPT | Performed by: FAMILY MEDICINE

## 2024-05-12 PROCEDURE — 85027 COMPLETE CBC AUTOMATED: CPT | Performed by: FAMILY MEDICINE

## 2024-05-12 PROCEDURE — 250N000011 HC RX IP 250 OP 636: Performed by: INTERNAL MEDICINE

## 2024-05-12 RX ORDER — OLANZAPINE 10 MG/2ML
5 INJECTION, POWDER, FOR SOLUTION INTRAMUSCULAR ONCE
Status: COMPLETED | OUTPATIENT
Start: 2024-05-12 | End: 2024-05-12

## 2024-05-12 RX ORDER — METOPROLOL TARTRATE 50 MG
50 TABLET ORAL 2 TIMES DAILY
Status: DISCONTINUED | OUTPATIENT
Start: 2024-05-12 | End: 2024-05-16

## 2024-05-12 RX ORDER — TRAMADOL HYDROCHLORIDE 50 MG/1
50 TABLET ORAL EVERY 8 HOURS PRN
Status: DISCONTINUED | OUTPATIENT
Start: 2024-05-12 | End: 2024-05-18 | Stop reason: HOSPADM

## 2024-05-12 RX ORDER — METOPROLOL TARTRATE 1 MG/ML
2.5 INJECTION, SOLUTION INTRAVENOUS EVERY 6 HOURS PRN
Status: DISCONTINUED | OUTPATIENT
Start: 2024-05-12 | End: 2024-05-13

## 2024-05-12 RX ORDER — METOPROLOL TARTRATE 25 MG/1
25 TABLET, FILM COATED ORAL 2 TIMES DAILY
Status: DISCONTINUED | OUTPATIENT
Start: 2024-05-12 | End: 2024-05-12

## 2024-05-12 RX ORDER — CEFTRIAXONE 1 G/1
1 INJECTION, POWDER, FOR SOLUTION INTRAMUSCULAR; INTRAVENOUS EVERY 24 HOURS
Status: COMPLETED | OUTPATIENT
Start: 2024-05-13 | End: 2024-05-13

## 2024-05-12 RX ORDER — GABAPENTIN 100 MG/1
100 CAPSULE ORAL 3 TIMES DAILY
Status: DISCONTINUED | OUTPATIENT
Start: 2024-05-12 | End: 2024-05-17

## 2024-05-12 RX ORDER — OLANZAPINE 10 MG/2ML
2.5 INJECTION, POWDER, FOR SOLUTION INTRAMUSCULAR ONCE
Status: DISCONTINUED | OUTPATIENT
Start: 2024-05-12 | End: 2024-05-12

## 2024-05-12 RX ORDER — HALOPERIDOL 5 MG/ML
2 INJECTION INTRAMUSCULAR EVERY 6 HOURS PRN
Status: DISCONTINUED | OUTPATIENT
Start: 2024-05-12 | End: 2024-05-17

## 2024-05-12 RX ADMIN — CEFTRIAXONE SODIUM 1 G: 1 INJECTION, POWDER, FOR SOLUTION INTRAMUSCULAR; INTRAVENOUS at 06:27

## 2024-05-12 RX ADMIN — FLUTICASONE PROPIONATE 1 SPRAY: 50 SPRAY, METERED NASAL at 09:23

## 2024-05-12 RX ADMIN — ATORVASTATIN CALCIUM 40 MG: 40 TABLET, FILM COATED ORAL at 08:30

## 2024-05-12 RX ADMIN — Medication 1 TABLET: at 08:30

## 2024-05-12 RX ADMIN — METOPROLOL TARTRATE 12.5 MG: 25 TABLET, FILM COATED ORAL at 08:30

## 2024-05-12 RX ADMIN — METOPROLOL TARTRATE 2.5 MG: 1 INJECTION, SOLUTION INTRAVENOUS at 23:42

## 2024-05-12 RX ADMIN — HEPARIN, PORCINE (PF) 10 UNIT/ML INTRAVENOUS SYRINGE 5 ML: at 08:49

## 2024-05-12 RX ADMIN — PREDNISONE 5 MG: 5 TABLET ORAL at 08:30

## 2024-05-12 RX ADMIN — ACETAMINOPHEN 1000 MG: 500 TABLET ORAL at 06:27

## 2024-05-12 RX ADMIN — OLANZAPINE 5 MG: 10 INJECTION, POWDER, FOR SOLUTION INTRAMUSCULAR at 16:56

## 2024-05-12 RX ADMIN — ASPIRIN 81 MG: 81 TABLET, COATED ORAL at 08:29

## 2024-05-12 RX ADMIN — TIZANIDINE 2 MG: 2 TABLET ORAL at 11:44

## 2024-05-12 RX ADMIN — OXYCODONE HYDROCHLORIDE 2.5 MG: 5 TABLET ORAL at 14:17

## 2024-05-12 RX ADMIN — METOPROLOL TARTRATE 5 MG: 1 INJECTION, SOLUTION INTRAVENOUS at 10:17

## 2024-05-12 RX ADMIN — HALOPERIDOL LACTATE 2 MG: 5 INJECTION, SOLUTION INTRAMUSCULAR at 23:27

## 2024-05-12 RX ADMIN — OLANZAPINE 5 MG: 10 INJECTION, POWDER, FOR SOLUTION INTRAMUSCULAR at 22:59

## 2024-05-12 RX ADMIN — AMIODARONE HYDROCHLORIDE 200 MG: 100 TABLET ORAL at 08:29

## 2024-05-12 ASSESSMENT — ACTIVITIES OF DAILY LIVING (ADL)
ADLS_ACUITY_SCORE: 55
ADLS_ACUITY_SCORE: 56
ADLS_ACUITY_SCORE: 54
ADLS_ACUITY_SCORE: 56
ADLS_ACUITY_SCORE: 57
ADLS_ACUITY_SCORE: 56
ADLS_ACUITY_SCORE: 54
ADLS_ACUITY_SCORE: 54
ADLS_ACUITY_SCORE: 55
ADLS_ACUITY_SCORE: 57
ADLS_ACUITY_SCORE: 56
ADLS_ACUITY_SCORE: 56
ADLS_ACUITY_SCORE: 55
ADLS_ACUITY_SCORE: 56
ADLS_ACUITY_SCORE: 56
ADLS_ACUITY_SCORE: 54
ADLS_ACUITY_SCORE: 56
ADLS_ACUITY_SCORE: 56
ADLS_ACUITY_SCORE: 54
ADLS_ACUITY_SCORE: 56
ADLS_ACUITY_SCORE: 54

## 2024-05-12 NOTE — PROGRESS NOTES
[facility]    Medicine Progress Note - Hospitalist Service    Date of Admission:  5/8/2024    Assessment & Plan     Jayashree Carson is a 73 year old female with past medical history of atrial fibrillation status post Watchman procedure, CVA with left-sided weakness, chronic diastolic CHF, hypertension, COPD, pulmonary hypertension with moderate right sided heart failure, and morbid obesity was transferred from [facility] to [facility] ED via EMS due to increased confusion and generalized fatigue for past few days.   On presentation to the ED patient's vital signs were significant for persistent hypotension and she was found to have an acute kidney injury with creatinine of 3.12.  UA was concerning for possible UTI and patient was started on Rocephin.  A fib with RVR was noted with HR in the 140-150 range.  She was given 2 L of fluids with transient improvement of blood pressure and heart rate but return of hypotension and HR in the 150s and decision was made to admit patient to ICU for ongoing management.     On arrival to the ICU, HR failed to improve with PO metoprolol XL with worsening of blood pressure noted and persistent hypotension once more developing and patient was transitioned to amiodarone bolus and subsequent  infusion on 5/9/24 with HR now in the 80-100s.  Blood pressures remained low, thought secondary to beta blocker effect and ongoing dehydration and patient received another 2 L fluid bolusing over the next 24 hours for blood pressures support and hydration.  Patient did not require vasopressor initiation.  Following extensive fluid resuscitation and improved HR control patient blood pressures have now stabilized with patient's mentation much improved.  HR has been in the 90-120s at rest but increasing into the 150s with activity.  Will start oral metoprolol with upward titration as blood pressures will allow to hopefully achieve HR less than  100 at rest and 120 with activity.      Extensive conversation occurred with patient and her family today regarding her current medical status and options going forward, code status, possible procedures that may need to be considered in upcoming days if HR control cannot be achieved.  All questions answered and patient/family will continue thinking about this information and inform if there is a change they desire in goals of care going forward.     Principal Problem:    Arterial hypotension    SIRS    Assessment: Likely due to combination of volume depletion/dehydration and a fib with RVR initially and continued even after initial corrective efforts likely secondary to regular dose of metoprolol XL given to control rapid atrial fibrillation early in this hospital course.  She has had overall improvement with initiation of amiodarone with improved rate control but still above goal this morning    Plan:   -Continue PO amiodarone 200 mg daily  -Start PO metoprolol 12.5 mg BID and titrate up as needed to achieve goal HR and as tolerated by blood pressures  -Continue holding spironolactone, bumetanide, Entresto.  -continue Rocephin for possibleUTI    Active Problems:    Acute renal failure superimposed on stage 3a chronic kidney disease, unspecified acute renal failure type (H)    Assessment: Acute renal failure likely due to combination of dehydration due to decreased PO intake and PTA medications. Renal function is rapidly improving with IV hydration: Cr 3.12 down down to 0.9 with urine output improving overall.      Plan:   -Continue holding spironolactone, bumetanide, Entresto.  -will saline lock IV fluids to avoid fluid overload development now that blood pressures have stabilized  -Continue strict I/O monitoring  -Repeat BMP tomorrow morning       Atrial fibrillation with RVR (H)    Presence of Watchman left atrial appendage closure device    Assessment: Uncontrolled HR likely exacerbated by dehydration. She  received her PTA dose of metoprolol at 4 am today. HR fast despite IV fluids. Patient was given Digoxin 125 mcg IV x1 without change noted.  Amiodarone bolus and drip started with HR decreased from 140-150s down to  on average while at rest but patient still getting into the 150-160s with activity.      Plan:   -Continue amiodarone 200 mg PO daily  -start metoprolol 12.5 mg BID and titrate up as needed.  IV metoprolol prn available for sustained HR above 110 at rest.  -no further digoxin at this time.   -Discussed possible need for transfer with consideration of cardioversion vs ablation if medical optimization is not successful and patient will think about if she would want procedures to be done going forward.       Hyperkalemia    Assessment: Secondary to acute renal failure with initial potassium of 6.1 and improved following kayexalate and IV fluids.  Potassium has now normalized as creatinine is improving towards previous normal.    Plan:   -continue to monitor for ongoing resolution       Bacteriuria - uncertain if symptomatic or asymptomatic in patient is vague recall    Assessment: Abnormal UA but unknown if patient had been having symptoms recently - was having hesitancy and decreased output but unclear if from infection or worsening NIC.  Procalcitonin low x 2: 0.27, 0.14. Rapidly improving leukocytosis.  Two species of Klebsiella now growing out on UC in patient with persistent hypotension     Plan: Continue ceftriaxone for completion of 5 day antibiotic course given clinical picture on first arrival       Increased anion gap metabolic acidosis    Assessment: Secondary to acute renal failure. Slowly improving as renal function improves with anion gap now normalized but bicarb is still low and metabolic acidosis present on VBG but bicarb is improving overall    Plan:   -saline lock IV fluids   -BMP and VBG tomorrow to monitor for ongoing resolution       Chronic diastolic congestive heart failure     Chronic right sided systolic heart failure    Assessment:  previous history and noted on echo performed during this hospitalization. Patient is normally on Bumex, spironolactone, and Entresto, all of which have been held since this admission due to persistent hypotension, NIC and dehydration     Plan:    -continue to hold home regimen  -saline lock IV fluids  -monitor for signs of volume overload going forward.       Essential hypertension    Assessment:  present at baseline with patient on Bumex, metoprolol XL, Entresto and spironolactone at baseline.  Presenting with persistent hypotension as above and home regimen is being held.     Plan:    -continue to hold home regimen for now and monitor for hypotension resolution       History of CVA with residual left sided weakness    Neuropathic pain on left side following CVA    Assessment:  previous history, on ASA daily.  Patient has had chronic neuropathic pain since her stroke and was well managed with gabapentin for years but for reasons unknown to patient and family she was weaned off this medication last December and has strugged with the return of this neuropathic pain since.  They are requesting reinitiation of the gabapentin.     Plan:    -will start gabapentin 100 mg BID.  IP pharmacy will attempt to figure out old regimen and will attempt to connect with long term care geriatrics team tomorrow to see why this medication had been discontinued   -continue ASA during this stay  -continue supportive cares       COPD    Assessment:  previous history with patient on prednisone 5 mg daily and uses prn albuterol for breakthrough symptoms    Plan:    -continue prednisone 5 mg for now but consider stress dose steroids if hypotension persists      Pulmonary hypertension    Assessment:  previous history per LTC facility records but patient and family unaware of history, not on oxygen at baseline and no on any medications     Plan:    -proceed with echo as above for  "re-evaluation           Diet: Snacks/Supplements Adult: Ensure Enlive; With Meals  Combination Diet Low Saturated Fat Na <2400mg Diet, No Caffeine Diet    DVT Prophylaxis: Enoxaparin (Lovenox) SQ  Miramontes Catheter: Not present  Lines: PRESENT             Cardiac Monitoring: ACTIVE order. Indication: Tachyarrhythmias, acute (48 hours)  Code Status: Full Code      Clinically Significant Risk Factors              # Hypoalbuminemia: Lowest albumin = 3.1 g/dL at 5/11/2024  5:18 AM, will monitor as appropriate     # Hypertension: Noted on problem list  # Heart failure with preserved ejection fraction: EF >50% and home medication list includes sacubitril/valsartan (Entresto)       # Severe Obesity: Estimated body mass index is 45.16 kg/m  as calculated from the following:    Height as of this encounter: 1.499 m (4' 11\").    Weight as of this encounter: 101.4 kg (223 lb 9.6 oz)., PRESENT ON ADMISSION     # Financial/Environmental Concerns: none         Disposition Plan     Medically Ready for Discharge: Anticipated in 2-4 Days             Mariah Cuevas MD  Hospitalist Service  [facility]  Securely message with [employer] (more info)  Text page via [facility] Paging/Directory   ______________________________________________________________________    Interval History   Patient has continued to improve overall with blood pressures normal and MAPs in the 80-90s.  HR continues to be  at rest and increased into the 150-160 range with activity this morning and improved upon rest.  Patient much more awake and pleasant - states she can finally tell she is getting better.  Patient having increased neuropathic pain in the left leg and arm which she reports has been present since her stroke but not well controlled recently.  No additional new concerns.  No new nursing concerns     Physical Exam   Vital Signs: Temp: 97.6  F (36.4  C) Temp src: Axillary BP: 123/89 Pulse: (!) 160   Resp: 16 SpO2: " 98 % O2 Device: None (Room air)    Weight: 223 lbs 9.6 oz    Constitutional: awake, alert, cooperative, no apparent distress, and appears stated age  Respiratory: No increased work of breathing, good air exchange, clear to auscultation bilaterally, no crackles or wheezing  Cardiovascular: irregularly irregular rhythm currently in the 100-120 range at rest   GI: bowel sounds present, abdomen soft and non-tender   Musculoskeletal: no lower extremity pitting edema present with ongoing non-pitting edema present in bilateral lower legs.  Ongoing residual weakness and some rigidity in left side   Neurologic: awake, alert, oriented to person, place and month/year but not oriented to situation and she reports now remembering much of the past few days     Medical Decision Making       60 MINUTES SPENT BY ME on the date of service doing chart review, history, exam, documentation & further activities per the note.      Data     I have personally reviewed the following data over the past 24 hrs:    10.0  \   12.4   / 200     143 118 (H) 32.4 (H) /  77   4.0 13 (L) 0.96 (H) \

## 2024-05-12 NOTE — PROGRESS NOTES
S-(situation): status update    B-(background): weakness, dehydration, a-fib    A-(assessment): patient c/o legs and feet burning today with pins and needles. Patient has been in bed for 3 days, we did get her up into a recliner chair this morning with the help of a elgin steady mobility device and 2 staff assist.  Did try 2 bites of eggs and 2 bites of yogurt with a bite of oatmeal this morning. Took pills whole with cranberry juice, in fact, has drank 3 juices so far today. Purewick in place. States she has not had a bowel movement for 3 days, is passing gas.  Did give her a leg/foot rub after breakfast to help with leg/foot pain and burning. She has her left great toe wrapped with gauze/tape, she says it helps her to stand on her feet. This writer removed that dressing, did not see any open areas, but she has asked me to re-wrap it, so will do that for her. With the activity of getting up into the recliner chair and eating breakfast, her heart rate did get tachycardic into 170's briefly, but 130's-140's, now that she is settled in the recliner chair, heart rate is down to 110's.    Patient states it feels good to be up in a chair and out of bed.    R-(recommendations): will encourage ADL cares today, while providing adequate rest periods for heart rate to recover.

## 2024-05-12 NOTE — PROGRESS NOTES
Received a call from the [employer] , who stated she received a call from the 9-1-1 emergency dispatcher with the request from the patient to get her out of this hospital. Kindly acknowledged the [employer] .     Patient is continuing to call people on her phone. Heart rates have decreased from 170's down to 120-140's, patient will not cooperate to obtain another blood pressure again.

## 2024-05-12 NOTE — PROGRESS NOTES
S-(situation): shift update    B-(background): up to commode    A-(assessment): After breakfast, patient stated she needed to get up to the commode, heart rate high so IV metoprolol given, elgin steady and assist of 2 to the commode, did have a moderate soft -liquidy BM, brown in color. Once settled back in the chair, legs up at her request. She has been talking to her family on the phone.  Heart rate 90's-120's, BP stable at 106/74. Oxygen saturations 95-99%, does appear winded with any activity.    R-(recommendations): Provider notified of IV Metoprolol given, she asked to be notified of heart rates/BP this afternoon for an update and would like to talk to daughter when she arrives today.

## 2024-05-12 NOTE — PROGRESS NOTES
Patients heart rate spiked up into the 160's-170's, began yelling out again. Charge nurse came into the room to try to deescalate and get a blood pressure, patient states she is dizzy, patient became combative and verbally abusive. BP obtained, 104/89.  Dr. Cuevas notified, ok'd to give more IV Metoprolol, but the order has parameters to hold with BP systolic below 110. Ordered IM Zyprexa. Given in left thigh with 2 staff holding her down while this writer administered the Zyprexa.  Awaiting Zyprexa to be effective.

## 2024-05-12 NOTE — PLAN OF CARE
"  Problem: Oral Intake Inadequate  Goal: Improved Oral Intake  Outcome: Not Progressing     Problem: Adult Inpatient Plan of Care  Goal: Plan of Care Review  Description: The Plan of Care Review/Shift note should be completed every shift.  The Outcome Evaluation is a brief statement about your assessment that the patient is improving, declining, or no change.  This information will be displayed automatically on your shift  note.  Outcome: Progressing  Goal: Patient-Specific Goal (Individualized)  Description: You can add care plan individualizations to a care plan. Examples of Individualization might be:  \"Parent requests to be called daily at 9am for status\", \"I have a hard time hearing out of my right ear\", or \"Do not touch me to wake me up as it startles  me\".  Outcome: Progressing  Goal: Absence of Hospital-Acquired Illness or Injury  Outcome: Progressing  Intervention: Identify and Manage Fall Risk  Recent Flowsheet Documentation  Taken 5/12/2024 0400 by Chris Horton RN  Safety Promotion/Fall Prevention:   activity supervised   increased rounding and observation   increase visualization of patient   room door open   room near nurse's station   safety round/check completed   supervised activity  Taken 5/12/2024 0000 by Chris Horton RN  Safety Promotion/Fall Prevention:   activity supervised   increased rounding and observation   increase visualization of patient   room door open   room near nurse's station   safety round/check completed   supervised activity  Taken 5/11/2024 2000 by Chris Horton, RN  Safety Promotion/Fall Prevention:   activity supervised   increased rounding and observation   increase visualization of patient   room door open   room near nurse's station   safety round/check completed   supervised activity  Intervention: Prevent Skin Injury  Recent Flowsheet Documentation  Taken 5/12/2024 0400 by Chris Horton RN  Body Position: refuses positioning  Skin Protection: incontinence pads " utilized  Device Skin Pressure Protection: absorbent pad utilized/changed  Taken 5/12/2024 0000 by Chris Horton RN  Body Position: refuses positioning  Skin Protection: incontinence pads utilized  Device Skin Pressure Protection: absorbent pad utilized/changed  Taken 5/11/2024 2000 by Chris Horton RN  Body Position: refuses positioning  Skin Protection: incontinence pads utilized  Device Skin Pressure Protection: absorbent pad utilized/changed  Intervention: Prevent and Manage VTE (Venous Thromboembolism) Risk  Recent Flowsheet Documentation  Taken 5/12/2024 0400 by Chris Horton RN  VTE Prevention/Management: (Lovenox) other (see comments)  Taken 5/12/2024 0000 by Chris Horton RN  VTE Prevention/Management: (Lovenox) other (see comments)  Taken 5/11/2024 2000 by Chris Horton RN  VTE Prevention/Management: (Lovenox) other (see comments)  Goal: Optimal Comfort and Wellbeing  Outcome: Progressing  Goal: Readiness for Transition of Care  Outcome: Progressing     Problem: Risk for Delirium  Goal: Optimal Coping  Outcome: Progressing  Intervention: Optimize Psychosocial Adjustment to Delirium  Recent Flowsheet Documentation  Taken 5/12/2024 0400 by Chris Horton RN  Supportive Measures:   active listening utilized   positive reinforcement provided  Taken 5/12/2024 0000 by Chris Horton RN  Supportive Measures:   active listening utilized   positive reinforcement provided  Taken 5/11/2024 2000 by Chris Horton RN  Supportive Measures:   active listening utilized   positive reinforcement provided  Goal: Improved Behavioral Control  Outcome: Progressing  Intervention: Minimize Safety Risk  Recent Flowsheet Documentation  Taken 5/12/2024 0400 by Chris Horton RN  Communication Enhancement Strategies: call light answered in person  Enhanced Safety Measures:   review medications for side effects with activity   room near unit station  Taken 5/12/2024 0000 by Chris Horton RN  Communication Enhancement  Strategies: call light answered in person  Enhanced Safety Measures:   review medications for side effects with activity   room near unit station  Taken 5/11/2024 2000 by Chris Horton RN  Communication Enhancement Strategies: call light answered in person  Enhanced Safety Measures:   review medications for side effects with activity   room near unit station  Goal: Improved Attention and Thought Clarity  Outcome: Progressing  Intervention: Maximize Cognitive Function  Recent Flowsheet Documentation  Taken 5/12/2024 0400 by Chris Horton RN  Reorientation Measures: clock in view  Taken 5/12/2024 0000 by Chris Horton RN  Reorientation Measures: clock in view  Taken 5/11/2024 2000 by Chris Horton RN  Reorientation Measures: clock in view  Goal: Improved Sleep  Outcome: Progressing     Problem: Fall Injury Risk  Goal: Absence of Fall and Fall-Related Injury  Outcome: Progressing  Intervention: Identify and Manage Contributors  Recent Flowsheet Documentation  Taken 5/12/2024 0400 by Chris Horton RN  Medication Review/Management: medications reviewed  Taken 5/12/2024 0000 by Chris Horton RN  Medication Review/Management: medications reviewed  Taken 5/11/2024 2000 by Chris Horton RN  Medication Review/Management: medications reviewed  Intervention: Promote Injury-Free Environment  Recent Flowsheet Documentation  Taken 5/12/2024 0400 by Chris Horton RN  Safety Promotion/Fall Prevention:   activity supervised   increased rounding and observation   increase visualization of patient   room door open   room near nurse's station   safety round/check completed   supervised activity  Taken 5/12/2024 0000 by Chris Horton RN  Safety Promotion/Fall Prevention:   activity supervised   increased rounding and observation   increase visualization of patient   room door open   room near nurse's station   safety round/check completed   supervised activity  Taken 5/11/2024 2000 by Chris Horton RN  Safety  Promotion/Fall Prevention:   activity supervised   increased rounding and observation   increase visualization of patient   room door open   room near nurse's station   safety round/check completed   supervised activity     Problem: Pain Acute  Goal: Optimal Pain Control and Function  Outcome: Progressing  Intervention: Prevent or Manage Pain  Recent Flowsheet Documentation  Taken 5/12/2024 0400 by Chris Horton RN  Bowel Elimination Promotion: adequate fluid intake promoted  Medication Review/Management: medications reviewed  Taken 5/12/2024 0000 by Chris Horton RN  Bowel Elimination Promotion: adequate fluid intake promoted  Medication Review/Management: medications reviewed  Taken 5/11/2024 2000 by Chris Horton RN  Bowel Elimination Promotion: adequate fluid intake promoted  Medication Review/Management: medications reviewed  Intervention: Optimize Psychosocial Wellbeing  Recent Flowsheet Documentation  Taken 5/12/2024 0400 by Chris Horton RN  Supportive Measures:   active listening utilized   positive reinforcement provided  Taken 5/12/2024 0000 by Chris Horton RN  Supportive Measures:   active listening utilized   positive reinforcement provided  Taken 5/11/2024 2000 by Chris Horton RN  Supportive Measures:   active listening utilized   positive reinforcement provided     Problem: Gas Exchange Impaired  Goal: Optimal Gas Exchange  Outcome: Progressing  Intervention: Optimize Oxygenation and Ventilation  Recent Flowsheet Documentation  Taken 5/12/2024 0400 by Chris Horton RN  Head of Bed (HOB) Positioning: HOB at 20-30 degrees  Taken 5/12/2024 0000 by Chris Horton RN  Head of Bed (HOB) Positioning: HOB at 20-30 degrees  Taken 5/11/2024 2000 by Chris Horton RN  Head of Bed (HOB) Positioning: HOB at 20-30 degrees     Problem: Infection  Goal: Absence of Infection Signs and Symptoms  Outcome: Progressing       Pt remains alert and uncooperative at times, and at other times has been  cooperative. She did allow to have blood pressure taken 3 times. Pm tylenol dose, lovenox, and repositioning refused. Urine output for the shift is 200, she has had about 500 ml of water intake.   Telemetry shows afib all shift with a rate in the 90's with intermittent rapid rates with activity such as with brief changes, and using bed pan.   Will continue to monitor telemetry, encourage cooperation with cares, and follow plan of care.

## 2024-05-13 LAB
ALBUMIN SERPL BCG-MCNC: 3.2 G/DL (ref 3.5–5.2)
ALP SERPL-CCNC: 116 U/L (ref 40–150)
ALT SERPL W P-5'-P-CCNC: 34 U/L (ref 0–50)
ANION GAP SERPL CALCULATED.3IONS-SCNC: 9 MMOL/L (ref 7–15)
AST SERPL W P-5'-P-CCNC: 22 U/L (ref 0–45)
BASE EXCESS BLDV CALC-SCNC: -7.6 MMOL/L (ref -3–3)
BILIRUB SERPL-MCNC: 0.5 MG/DL
BUN SERPL-MCNC: 27 MG/DL (ref 8–23)
CALCIUM SERPL-MCNC: 8.8 MG/DL (ref 8.8–10.2)
CHLORIDE SERPL-SCNC: 117 MMOL/L (ref 98–107)
CREAT SERPL-MCNC: 1.07 MG/DL (ref 0.51–0.95)
DEPRECATED HCO3 PLAS-SCNC: 15 MMOL/L (ref 22–29)
EGFRCR SERPLBLD CKD-EPI 2021: 54 ML/MIN/1.73M2
ERYTHROCYTE [DISTWIDTH] IN BLOOD BY AUTOMATED COUNT: 15.6 % (ref 10–15)
GLUCOSE SERPL-MCNC: 107 MG/DL (ref 70–99)
HCO3 BLDV-SCNC: 16 MMOL/L (ref 21–28)
HCT VFR BLD AUTO: 37.5 % (ref 35–47)
HGB BLD-MCNC: 11.7 G/DL (ref 11.7–15.7)
MAGNESIUM SERPL-MCNC: 1.6 MG/DL (ref 1.7–2.3)
MCH RBC QN AUTO: 29.5 PG (ref 26.5–33)
MCHC RBC AUTO-ENTMCNC: 31.2 G/DL (ref 31.5–36.5)
MCV RBC AUTO: 95 FL (ref 78–100)
O2/TOTAL GAS SETTING VFR VENT: 21 %
OXYHGB MFR BLDV: 96 % (ref 70–75)
PCO2 BLDV: 26 MM HG (ref 40–50)
PH BLDV: 7.38 [PH] (ref 7.32–7.43)
PHOSPHATE SERPL-MCNC: 2.2 MG/DL (ref 2.5–4.5)
PLATELET # BLD AUTO: 203 10E3/UL (ref 150–450)
PO2 BLDV: 78 MM HG (ref 25–47)
POTASSIUM SERPL-SCNC: 4.4 MMOL/L (ref 3.4–5.3)
PROCALCITONIN SERPL IA-MCNC: 0.08 NG/ML
PROT SERPL-MCNC: 6.2 G/DL (ref 6.4–8.3)
RBC # BLD AUTO: 3.97 10E6/UL (ref 3.8–5.2)
SAO2 % BLDV: 97.5 % (ref 70–75)
SODIUM SERPL-SCNC: 141 MMOL/L (ref 135–145)
WBC # BLD AUTO: 12.1 10E3/UL (ref 4–11)

## 2024-05-13 PROCEDURE — 85014 HEMATOCRIT: CPT | Performed by: FAMILY MEDICINE

## 2024-05-13 PROCEDURE — 250N000009 HC RX 250: Performed by: FAMILY MEDICINE

## 2024-05-13 PROCEDURE — 99233 SBSQ HOSP IP/OBS HIGH 50: CPT | Performed by: FAMILY MEDICINE

## 2024-05-13 PROCEDURE — 250N000013 HC RX MED GY IP 250 OP 250 PS 637: Performed by: FAMILY MEDICINE

## 2024-05-13 PROCEDURE — 80053 COMPREHEN METABOLIC PANEL: CPT | Performed by: FAMILY MEDICINE

## 2024-05-13 PROCEDURE — 36415 COLL VENOUS BLD VENIPUNCTURE: CPT | Performed by: FAMILY MEDICINE

## 2024-05-13 PROCEDURE — 84100 ASSAY OF PHOSPHORUS: CPT | Performed by: FAMILY MEDICINE

## 2024-05-13 PROCEDURE — 83735 ASSAY OF MAGNESIUM: CPT | Performed by: FAMILY MEDICINE

## 2024-05-13 PROCEDURE — 200N000001 HC R&B ICU

## 2024-05-13 PROCEDURE — 84145 PROCALCITONIN (PCT): CPT | Performed by: FAMILY MEDICINE

## 2024-05-13 PROCEDURE — 258N000003 HC RX IP 258 OP 636: Performed by: FAMILY MEDICINE

## 2024-05-13 PROCEDURE — 82805 BLOOD GASES W/O2 SATURATION: CPT | Performed by: FAMILY MEDICINE

## 2024-05-13 PROCEDURE — 250N000011 HC RX IP 250 OP 636: Mod: JZ | Performed by: FAMILY MEDICINE

## 2024-05-13 PROCEDURE — 250N000009 HC RX 250: Performed by: STUDENT IN AN ORGANIZED HEALTH CARE EDUCATION/TRAINING PROGRAM

## 2024-05-13 RX ORDER — DEXTROSE MONOHYDRATE 25 G/50ML
25-50 INJECTION, SOLUTION INTRAVENOUS
Status: DISCONTINUED | OUTPATIENT
Start: 2024-05-13 | End: 2024-05-18 | Stop reason: HOSPADM

## 2024-05-13 RX ORDER — MAGNESIUM SULFATE HEPTAHYDRATE 40 MG/ML
2 INJECTION, SOLUTION INTRAVENOUS ONCE
Status: COMPLETED | OUTPATIENT
Start: 2024-05-13 | End: 2024-05-13

## 2024-05-13 RX ORDER — DEXTROSE MONOHYDRATE, SODIUM CHLORIDE, AND POTASSIUM CHLORIDE 50; 1.49; 4.5 G/1000ML; G/1000ML; G/1000ML
INJECTION, SOLUTION INTRAVENOUS CONTINUOUS
Status: DISCONTINUED | OUTPATIENT
Start: 2024-05-13 | End: 2024-05-15

## 2024-05-13 RX ORDER — DEXMEDETOMIDINE HYDROCHLORIDE 4 UG/ML
.1-1.2 INJECTION, SOLUTION INTRAVENOUS CONTINUOUS
Status: DISCONTINUED | OUTPATIENT
Start: 2024-05-13 | End: 2024-05-16

## 2024-05-13 RX ORDER — NICOTINE POLACRILEX 4 MG
15-30 LOZENGE BUCCAL
Status: DISCONTINUED | OUTPATIENT
Start: 2024-05-13 | End: 2024-05-18 | Stop reason: HOSPADM

## 2024-05-13 RX ORDER — METOPROLOL TARTRATE 1 MG/ML
2.5 INJECTION, SOLUTION INTRAVENOUS EVERY 6 HOURS PRN
Status: DISCONTINUED | OUTPATIENT
Start: 2024-05-13 | End: 2024-05-18 | Stop reason: HOSPADM

## 2024-05-13 RX ORDER — LIDOCAINE 40 MG/G
CREAM TOPICAL
Status: ACTIVE | OUTPATIENT
Start: 2024-05-13 | End: 2024-05-16

## 2024-05-13 RX ADMIN — DEXMEDETOMIDINE HYDROCHLORIDE 0.2 MCG/KG/HR: 4 INJECTION, SOLUTION INTRAVENOUS at 01:04

## 2024-05-13 RX ADMIN — MAGNESIUM SULFATE HEPTAHYDRATE 2 G: 40 INJECTION, SOLUTION INTRAVENOUS at 14:04

## 2024-05-13 RX ADMIN — POTASSIUM CHLORIDE, DEXTROSE MONOHYDRATE AND SODIUM CHLORIDE: 150; 5; 450 INJECTION, SOLUTION INTRAVENOUS at 15:04

## 2024-05-13 RX ADMIN — CEFTRIAXONE SODIUM 1 G: 1 INJECTION, POWDER, FOR SOLUTION INTRAMUSCULAR; INTRAVENOUS at 05:24

## 2024-05-13 RX ADMIN — METOPROLOL TARTRATE 2.5 MG: 1 INJECTION, SOLUTION INTRAVENOUS at 22:18

## 2024-05-13 RX ADMIN — AMIODARONE HYDROCHLORIDE 0.5 MG/MIN: 50 INJECTION, SOLUTION INTRAVENOUS at 11:13

## 2024-05-13 RX ADMIN — SODIUM PHOSPHATE, MONOBASIC, MONOHYDRATE AND SODIUM PHOSPHATE, DIBASIC, ANHYDROUS 15 MMOL: 142; 276 INJECTION, SOLUTION INTRAVENOUS at 15:28

## 2024-05-13 ASSESSMENT — ACTIVITIES OF DAILY LIVING (ADL)
ADLS_ACUITY_SCORE: 57

## 2024-05-13 NOTE — PROGRESS NOTES
Pt combative and using her call light, phone, and cell phone to hit staff. These items were removed from pt and a 1:1 sitter was ordered for patient safety.

## 2024-05-13 NOTE — PROGRESS NOTES
"Patient very angry and refusing cares. She states that she \"was placed in the corner standing with everyone standing around her laughing\". She says we are out to get her and she is in hell. She was also having some delusional ideations. I discussed taking a blood pressure and her medications and she stated no, and that we were out to kill her. Tele ICU Zyprexa 5 mg IM ordered and given. Will continue to encourage he to take her medications and follow plan of care. Daughter called and updated on condition. Awaiting to see if Zyprexa will help  "

## 2024-05-13 NOTE — PROGRESS NOTES
Pt continues to refuse all medication, cares, does not make eye contact, tells all staff to leave the room, and lays awake staring at the ceiling. IV metoprolol could be administered for ongoing rapid heart rate but would need a blood pressure which she refuses to cooperate with. Pt still has not voided and refuses to cooperate with bladder scanning. Multiple attempts with cares have been attempted by different staff with no success in cooperation. Will continue to make attempts to provide the care needed.

## 2024-05-13 NOTE — PLAN OF CARE
Goal Outcome Evaluation:           Overall Patient Progress: no changeOverall Patient Progress: no change         Major shift events: Pt refused to take any oral medications this shift. Heart rate at rest has been 80 to low 100's but increased up to 160-170's when agitated. Provider updated and IV amiodarone ordered and started per order. Heart rate did maintain 's while on amiodarone (primarily 80 to low 100's.) Pt was changed to ICU status. Precedex was paused early AM previous to this shift and was not restarted this shift. Pt has been resting most of this shift but was verbally aggressive and this morning was also hitting at medical staff. Pt did become more receptive to cares this afternoon allowing for a complete head to toe assessment, repositioning and to hygiene cares.   Neuro: Unable to completely assess as pt would swear at writer and not always answer questions. Pt answers to name and did tell writer she was in [place] this morning. Pupils are equal and reactive. Pt does have equal strength.   CMS: Pt is moving extremities, has good pulses and cap refill at 3 seconds.  Pulmonary: Lung sounds are clear slightly diminished.  CV: Heart sounds are irregular irregular. Tele is Afib to afib with RVR. Pt has a run of wide complex t[place]cadia this afternoon (post start of amiodarone) provider updated and Mag and phos protocol ordered. Mag replaced and phosphorous being replaced at this time. Both to be rechecked in AM.  GI: Bowel sounds are active and pt had a smear bowel movement this shift. Pt refused to eat and refused to drink. Provider updated. IV fluids started this afternoon.  : Pt has had no urine output this shift. Bladder scanned for 136. Provider updated.   Skin: Blanchable redness noted on coccyx-improved with repositioning. Abrasion on right upper arm and scattered bruising. Pt initially refused to be turned and repositioned but was agreeable this afternoon.  Pain: Pt denied and refused  scheduled tylenol.  Activity: Pt refused to get up out of bed this shift. Pt turned and repositioned.  Hygiene: CHG wipes done with partial bath. Gown and linen changed. Pt refused oral cares.  Lines/Tubes/Drains: Midline x1 PIV x1 and purewick in place.  Drips: Amiodarone infusing at 0.5mg/min, D51/2 NS with 20 meq K at 75 mL and sodium phosphate infusing at 62.5 ml/hr/    Plan: Continue to monitor pt and cardiac status. Support pt's medical and overall wellbeing. Encourage pt to take oral medications, food and fluids.

## 2024-05-13 NOTE — PROGRESS NOTES
Nutrition Therapy Update: Brief Note    Writer has been monitoring pt intake - noted via flow sheet that pt has been refusing most meals. Does not like Ensure enlive ordered, reportedly does not like supplement and refuses other flavors.     Nursing staff have been encouraging meals, supplements, additional options with frequent declining from pt.     Will defer meeting with pt for NFPE and nutrition hx at this time - pt has been combative and agitated. Refusing to take medications. Verbally abusive and combative towards nursing staff when attempting cares.     Unsure about weight status as weight has been going up during stay, via bed scale so unsure of accuracy. Has 1+ trace to 2+ mild BLE edema.     Recommendations  - Discontinued Ensure enlive as pt was not drinking them  - Please continue to offer meals, encourage intake as able  - Receiving yogurt with meals per request     *added trial of Magic cup with dinner tray 5/13, will add to diet order if pt willing to accept / tolerates  May order PRN supplements between meals but likely that pt will not request them    Please consider potential appetite stimulant medication as appropriate?   *writer noting that pt has been refusing meds    Will continue to follow and offer other interventions as able - highly recommend initiation of nutrition support if possible pending goals of care discussion    Delaney Hernandez RD, LD  Clinical Dietitian  Office: [phone]  Weekend pager: [phone]
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
## 2024-05-13 NOTE — PROGRESS NOTES
Pt continues to be uncooperative with cares, combative when touched, again elevating the degree of difficulty to provide care needed.  Heart rate continues with a rapid rate ,120s at rest and as high as 160's when approached for cares and with any activity or motion from herself in the bed. Ongoing efforts have been made to orient her as well as help her understand the importance of cares needed, such as straight cath as she has not been able to empty her bladder. Pt was bladder scanned for 484 ml , provider notified straight cath order obtained.

## 2024-05-13 NOTE — PLAN OF CARE
"  Problem: Oral Intake Inadequate  Goal: Improved Oral Intake  Outcome: Not Progressing     Problem: Adult Inpatient Plan of Care  Goal: Plan of Care Review  Description: The Plan of Care Review/Shift note should be completed every shift.  The Outcome Evaluation is a brief statement about your assessment that the patient is improving, declining, or no change.  This information will be displayed automatically on your shift  note.  Outcome: Progressing  Goal: Patient-Specific Goal (Individualized)  Description: You can add care plan individualizations to a care plan. Examples of Individualization might be:  \"Parent requests to be called daily at 9am for status\", \"I have a hard time hearing out of my right ear\", or \"Do not touch me to wake me up as it startles  me\".  Outcome: Progressing  Goal: Absence of Hospital-Acquired Illness or Injury  Outcome: Progressing  Intervention: Identify and Manage Fall Risk  Recent Flowsheet Documentation  Taken 5/13/2024 0400 by Chris Horton, RN  Safety Promotion/Fall Prevention:   activity supervised   increased rounding and observation   increase visualization of patient   room door open   room near nurse's station   room organization consistent   safety round/check completed   supervised activity  Taken 5/13/2024 0000 by Chris Horton, RN  Safety Promotion/Fall Prevention:   activity supervised   increased rounding and observation   increase visualization of patient   room door open   room near nurse's station   room organization consistent   safety round/check completed   supervised activity  Taken 5/12/2024 2000 by Chris Horton, RN  Safety Promotion/Fall Prevention:   activity supervised   increased rounding and observation   increase visualization of patient   room door open   room near nurse's station   room organization consistent   safety round/check completed   supervised activity  Intervention: Prevent Skin Injury  Recent Flowsheet Documentation  Taken 5/13/2024 0400 by " Chris Horton RN  Body Position: refuses positioning  Device Skin Pressure Protection: positioning supports utilized  Taken 5/13/2024 0000 by Chris Horton RN  Body Position: refuses positioning  Device Skin Pressure Protection: positioning supports utilized  Taken 5/12/2024 2000 by Chris Horton RN  Body Position: refuses positioning  Device Skin Pressure Protection: positioning supports utilized  Intervention: Prevent and Manage VTE (Venous Thromboembolism) Risk  Recent Flowsheet Documentation  Taken 5/13/2024 0400 by Chris Horton RN  VTE Prevention/Management: other (see comments)  Taken 5/13/2024 0000 by Chris Horton RN  VTE Prevention/Management: other (see comments)  Taken 5/12/2024 2000 by Chris Horton RN  VTE Prevention/Management: other (see comments)  Intervention: Prevent Infection  Recent Flowsheet Documentation  Taken 5/13/2024 0400 by Chris Horton RN  Infection Prevention:   hand hygiene promoted   rest/sleep promoted   single patient room provided  Taken 5/13/2024 0000 by Chris Horton RN  Infection Prevention:   hand hygiene promoted   rest/sleep promoted   single patient room provided  Taken 5/12/2024 2000 by Chris Horton RN  Infection Prevention:   hand hygiene promoted   rest/sleep promoted   single patient room provided  Goal: Optimal Comfort and Wellbeing  Outcome: Progressing  Goal: Readiness for Transition of Care  Outcome: Progressing     Problem: Risk for Delirium  Goal: Optimal Coping  Outcome: Progressing  Intervention: Optimize Psychosocial Adjustment to Delirium  Recent Flowsheet Documentation  Taken 5/13/2024 0400 by Chris Horton RN  Supportive Measures:   active listening utilized   positive reinforcement provided  Taken 5/13/2024 0000 by Chris Horton RN  Supportive Measures:   active listening utilized   positive reinforcement provided  Taken 5/12/2024 2000 by Chris Horton RN  Supportive Measures:   active listening utilized   positive reinforcement  provided  Goal: Improved Behavioral Control  Outcome: Progressing  Intervention: Minimize Safety Risk  Recent Flowsheet Documentation  Taken 5/13/2024 0400 by Chris Horton RN  Communication Enhancement Strategies: call light answered in person  Enhanced Safety Measures:   review medications for side effects with activity   room near unit station  Taken 5/13/2024 0000 by Chris Horton RN  Communication Enhancement Strategies: call light answered in person  Enhanced Safety Measures:   review medications for side effects with activity   room near unit station  Taken 5/12/2024 2000 by Chris Horton RN  Communication Enhancement Strategies: call light answered in person  Enhanced Safety Measures:   review medications for side effects with activity   room near unit station  Goal: Improved Attention and Thought Clarity  Outcome: Progressing  Intervention: Maximize Cognitive Function  Recent Flowsheet Documentation  Taken 5/13/2024 0400 by Chris Horton RN  Sensory Stimulation Regulation: care clustered  Reorientation Measures: clock in view  Taken 5/13/2024 0000 by Chris Horton RN  Sensory Stimulation Regulation: care clustered  Reorientation Measures: clock in view  Taken 5/12/2024 2000 by Chris Horton RN  Sensory Stimulation Regulation: care clustered  Reorientation Measures: clock in view  Goal: Improved Sleep  Outcome: Progressing     Problem: Fall Injury Risk  Goal: Absence of Fall and Fall-Related Injury  Outcome: Progressing  Intervention: Identify and Manage Contributors  Recent Flowsheet Documentation  Taken 5/13/2024 0400 by Chris Horton RN  Medication Review/Management: medications reviewed  Taken 5/13/2024 0000 by Chris Horton RN  Medication Review/Management: medications reviewed  Taken 5/12/2024 2000 by Chris Horton RN  Medication Review/Management: medications reviewed  Intervention: Promote Injury-Free Environment  Recent Flowsheet Documentation  Taken 5/13/2024 0400 by Clifford  BERNICE Perez  Safety Promotion/Fall Prevention:   activity supervised   increased rounding and observation   increase visualization of patient   room door open   room near nurse's station   room organization consistent   safety round/check completed   supervised activity  Taken 5/13/2024 0000 by Chris Horton RN  Safety Promotion/Fall Prevention:   activity supervised   increased rounding and observation   increase visualization of patient   room door open   room near nurse's station   room organization consistent   safety round/check completed   supervised activity  Taken 5/12/2024 2000 by Chris Horton RN  Safety Promotion/Fall Prevention:   activity supervised   increased rounding and observation   increase visualization of patient   room door open   room near nurse's station   room organization consistent   safety round/check completed   supervised activity     Problem: Pain Acute  Goal: Optimal Pain Control and Function  Outcome: Progressing  Intervention: Prevent or Manage Pain  Recent Flowsheet Documentation  Taken 5/13/2024 0400 by Chris Horton RN  Sensory Stimulation Regulation: care clustered  Medication Review/Management: medications reviewed  Taken 5/13/2024 0000 by Chris Horton RN  Sensory Stimulation Regulation: care clustered  Medication Review/Management: medications reviewed  Taken 5/12/2024 2000 by Chris Horton RN  Sensory Stimulation Regulation: care clustered  Medication Review/Management: medications reviewed  Intervention: Optimize Psychosocial Wellbeing  Recent Flowsheet Documentation  Taken 5/13/2024 0400 by Chris Horton RN  Supportive Measures:   active listening utilized   positive reinforcement provided  Taken 5/13/2024 0000 by Chris Horton RN  Supportive Measures:   active listening utilized   positive reinforcement provided  Taken 5/12/2024 2000 by Chris Horton RN  Supportive Measures:   active listening utilized   positive reinforcement provided     Problem: Gas  Exchange Impaired  Goal: Optimal Gas Exchange  Outcome: Progressing  Intervention: Optimize Oxygenation and Ventilation  Recent Flowsheet Documentation  Taken 5/13/2024 0400 by Chris Horton RN  Head of Bed (HOB) Positioning: HOB at 20-30 degrees  Taken 5/13/2024 0000 by Chris Horton RN  Head of Bed (HOB) Positioning: HOB at 20-30 degrees  Taken 5/12/2024 2000 by Chris Horton RN  Head of Bed (HOB) Positioning: HOB at 20-30 degrees     Problem: Infection  Goal: Absence of Infection Signs and Symptoms  Outcome: Progressing       Precedex is now paused after blood pressures started to trend down. Pt has since been able to sleep. She did agree to straight cath and that was done with the assistance of two other staff with a 600 ml cloudy output. Telemetry now shows Afib with a rate in the 80's while Pt is sleeping. Oxygen saturations are stable on room air.   Will continue to monitor telemetry, Titrate precedex as needed, and follow plan of care.  Please see all other notes from this shift.

## 2024-05-13 NOTE — PROGRESS NOTES
Precedex infusion has been titrated up to 0.6 with little desired effect, Pt has still been intermittently agitated, refusing cares, with intermittent rapid heart rates with activity still at high at 150's and 120's at rest.

## 2024-05-13 NOTE — PROGRESS NOTES
S- Transfer to ICU status from Med/surg status.    B- Pt admitted with Hypotension/dehydration. Went into AFIB with RVR.    A- Brief systems assessment: Pt is alert to self and occasionally place. When at rest pt's heart rate is 80 to low 100's but when agitated heart rate increases to 110-160's. Rhythm has been afib. Pt is currently refusing to take medications, a complete assessment or to be turned and repositioned. Pt is verbally abusive and combative towards nursing staff when cares and medications are being offered/attempted. Provider updated on pt's refusal. Pt transitioned back to ICU status with plan to start amiodarone drip. Pt is resting between attempted cares-precedex was not restarted. Will continue to monitor pt closely throughout this shift.    R- Transfer to ICU per physician orders. Continue to monitor pt and update physician as needed.      Code status: Full Code  Skin:  no changes  Fall Risk: Yes- Department fall risk interventions implemented.  Isolation and Signage: None  Medication drips upon transfer: None currently. Amiodarone to be started once available.  Blue Bin checked and medications transfer out with patientN/A Pt did not switch rooms only status.

## 2024-05-13 NOTE — PROGRESS NOTES
Tele ICU consulted. IM zyprexa ordered and administered, Pt was very angry, hitting at staff at that time, will re approach with cares and medication if and when Pt is agreeable. Rapid heart rates continues. Charge nurse has updated daughter.

## 2024-05-14 ENCOUNTER — APPOINTMENT (OUTPATIENT)
Dept: GENERAL RADIOLOGY | Facility: CLINIC | Age: 74
DRG: 314 | End: 2024-05-14
Attending: INTERNAL MEDICINE
Payer: COMMERCIAL

## 2024-05-14 LAB
ANION GAP SERPL CALCULATED.3IONS-SCNC: 11 MMOL/L (ref 7–15)
BACTERIA BLD CULT: NO GROWTH
BACTERIA BLD CULT: NO GROWTH
BASE EXCESS BLDV CALC-SCNC: -8.4 MMOL/L (ref -3–3)
BUN SERPL-MCNC: 23.2 MG/DL (ref 8–23)
CALCIUM SERPL-MCNC: 8.3 MG/DL (ref 8.8–10.2)
CHLORIDE SERPL-SCNC: 117 MMOL/L (ref 98–107)
CREAT SERPL-MCNC: 1.04 MG/DL (ref 0.51–0.95)
DEPRECATED HCO3 PLAS-SCNC: 14 MMOL/L (ref 22–29)
EGFRCR SERPLBLD CKD-EPI 2021: 56 ML/MIN/1.73M2
ERYTHROCYTE [DISTWIDTH] IN BLOOD BY AUTOMATED COUNT: 15.8 % (ref 10–15)
GLUCOSE SERPL-MCNC: 141 MG/DL (ref 70–99)
HCO3 BLDV-SCNC: 16 MMOL/L (ref 21–28)
HCT VFR BLD AUTO: 35.5 % (ref 35–47)
HGB BLD-MCNC: 11.2 G/DL (ref 11.7–15.7)
MAGNESIUM SERPL-MCNC: 2.1 MG/DL (ref 1.7–2.3)
MCH RBC QN AUTO: 29.7 PG (ref 26.5–33)
MCHC RBC AUTO-ENTMCNC: 31.5 G/DL (ref 31.5–36.5)
MCV RBC AUTO: 94 FL (ref 78–100)
NT-PROBNP SERPL-MCNC: ABNORMAL PG/ML (ref 0–900)
O2/TOTAL GAS SETTING VFR VENT: 21 %
OXYHGB MFR BLDV: 93 % (ref 70–75)
PCO2 BLDV: 28 MM HG (ref 40–50)
PH BLDV: 7.36 [PH] (ref 7.32–7.43)
PHOSPHATE SERPL-MCNC: 2.7 MG/DL (ref 2.5–4.5)
PLATELET # BLD AUTO: 187 10E3/UL (ref 150–450)
PO2 BLDV: 67 MM HG (ref 25–47)
POTASSIUM SERPL-SCNC: 4.5 MMOL/L (ref 3.4–5.3)
RBC # BLD AUTO: 3.77 10E6/UL (ref 3.8–5.2)
SAO2 % BLDV: 94.2 % (ref 70–75)
SODIUM SERPL-SCNC: 142 MMOL/L (ref 135–145)
WBC # BLD AUTO: 8.6 10E3/UL (ref 4–11)

## 2024-05-14 PROCEDURE — 83735 ASSAY OF MAGNESIUM: CPT | Performed by: FAMILY MEDICINE

## 2024-05-14 PROCEDURE — 258N000003 HC RX IP 258 OP 636: Performed by: FAMILY MEDICINE

## 2024-05-14 PROCEDURE — 84100 ASSAY OF PHOSPHORUS: CPT | Performed by: FAMILY MEDICINE

## 2024-05-14 PROCEDURE — 250N000013 HC RX MED GY IP 250 OP 250 PS 637: Performed by: FAMILY MEDICINE

## 2024-05-14 PROCEDURE — 250N000012 HC RX MED GY IP 250 OP 636 PS 637: Performed by: FAMILY MEDICINE

## 2024-05-14 PROCEDURE — 99233 SBSQ HOSP IP/OBS HIGH 50: CPT | Performed by: INTERNAL MEDICINE

## 2024-05-14 PROCEDURE — 82805 BLOOD GASES W/O2 SATURATION: CPT | Performed by: FAMILY MEDICINE

## 2024-05-14 PROCEDURE — 80048 BASIC METABOLIC PNL TOTAL CA: CPT | Performed by: FAMILY MEDICINE

## 2024-05-14 PROCEDURE — 200N000001 HC R&B ICU

## 2024-05-14 PROCEDURE — 83880 ASSAY OF NATRIURETIC PEPTIDE: CPT | Performed by: FAMILY MEDICINE

## 2024-05-14 PROCEDURE — 36415 COLL VENOUS BLD VENIPUNCTURE: CPT | Performed by: FAMILY MEDICINE

## 2024-05-14 PROCEDURE — 250N000013 HC RX MED GY IP 250 OP 250 PS 637: Performed by: INTERNAL MEDICINE

## 2024-05-14 PROCEDURE — 250N000009 HC RX 250: Performed by: STUDENT IN AN ORGANIZED HEALTH CARE EDUCATION/TRAINING PROGRAM

## 2024-05-14 PROCEDURE — 272N000580 HC KIT, 4 OR 5FR DUAL LUMEN POWER PICC

## 2024-05-14 PROCEDURE — 250N000009 HC RX 250: Performed by: INTERNAL MEDICINE

## 2024-05-14 PROCEDURE — 71045 X-RAY EXAM CHEST 1 VIEW: CPT

## 2024-05-14 PROCEDURE — 250N000011 HC RX IP 250 OP 636: Performed by: INTERNAL MEDICINE

## 2024-05-14 PROCEDURE — 36569 INSJ PICC 5 YR+ W/O IMAGING: CPT

## 2024-05-14 PROCEDURE — 250N000011 HC RX IP 250 OP 636: Mod: JZ | Performed by: FAMILY MEDICINE

## 2024-05-14 PROCEDURE — 85014 HEMATOCRIT: CPT | Performed by: FAMILY MEDICINE

## 2024-05-14 PROCEDURE — 258N000003 HC RX IP 258 OP 636: Performed by: INTERNAL MEDICINE

## 2024-05-14 RX ORDER — BUMETANIDE 0.25 MG/ML
1 INJECTION INTRAMUSCULAR; INTRAVENOUS ONCE
Status: COMPLETED | OUTPATIENT
Start: 2024-05-14 | End: 2024-05-14

## 2024-05-14 RX ADMIN — SODIUM PHOSPHATE, MONOBASIC, MONOHYDRATE AND SODIUM PHOSPHATE, DIBASIC, ANHYDROUS 9 MMOL: 142; 276 INJECTION, SOLUTION INTRAVENOUS at 15:49

## 2024-05-14 RX ADMIN — GABAPENTIN 100 MG: 100 CAPSULE ORAL at 22:04

## 2024-05-14 RX ADMIN — BUMETANIDE 1 MG: 0.25 INJECTION, SOLUTION INTRAMUSCULAR; INTRAVENOUS at 19:16

## 2024-05-14 RX ADMIN — AMIODARONE HYDROCHLORIDE 0.5 MG/MIN: 50 INJECTION, SOLUTION INTRAVENOUS at 18:22

## 2024-05-14 RX ADMIN — POTASSIUM CHLORIDE, DEXTROSE MONOHYDRATE AND SODIUM CHLORIDE: 150; 5; 450 INJECTION, SOLUTION INTRAVENOUS at 18:51

## 2024-05-14 RX ADMIN — DEXMEDETOMIDINE HYDROCHLORIDE 0.1 MCG/KG/HR: 4 INJECTION, SOLUTION INTRAVENOUS at 06:40

## 2024-05-14 RX ADMIN — ACETAMINOPHEN 1000 MG: 500 TABLET ORAL at 22:04

## 2024-05-14 RX ADMIN — ENOXAPARIN SODIUM 40 MG: 40 INJECTION SUBCUTANEOUS at 10:23

## 2024-05-14 RX ADMIN — TIZANIDINE 2 MG: 2 TABLET ORAL at 03:13

## 2024-05-14 RX ADMIN — POTASSIUM CHLORIDE, DEXTROSE MONOHYDRATE AND SODIUM CHLORIDE: 150; 5; 450 INJECTION, SOLUTION INTRAVENOUS at 04:43

## 2024-05-14 ASSESSMENT — ACTIVITIES OF DAILY LIVING (ADL)
ADLS_ACUITY_SCORE: 57
ADLS_ACUITY_SCORE: 55
ADLS_ACUITY_SCORE: 55
ADLS_ACUITY_SCORE: 57
ADLS_ACUITY_SCORE: 55
ADLS_ACUITY_SCORE: 57
ADLS_ACUITY_SCORE: 55
ADLS_ACUITY_SCORE: 57

## 2024-05-14 NOTE — PLAN OF CARE
Goal Outcome Evaluation:      Plan of Care Reviewed With: patient    Overall Patient Progress: improvingOverall Patient Progress: improving    Outcome Evaluation: 4 hour shift note: Pt A&Ox4. Diastolic hypertension present, otherwise VSS on RA and amiodarone gtt continuously running at 0.5mg/min. Poor appetite. Miramontes cath had 75 ml out. Denies pain at this time.

## 2024-05-14 NOTE — PROGRESS NOTES
Read - 9:48 pm    217 - Was able to get small IV in for now until PICC can come. Amiodarone restarted at 2040. Her HR is still 120s to 150s. Did restart her precedex as well      Anton Pratt - 9:48 pm  Ok, keep me updated

## 2024-05-14 NOTE — PROGRESS NOTES
"Unable to assess orientation as pt will not answer questions. When asked anything, pt stares or roll eyes. Would not allow for oral temperature, pt bit down hard on thermometer and then tried to spit at writer. Later in the evening when asked a question pt stated \"F off\". Pt refused to eat, drink, or take oral medications. Pt did allow for an oral temperate this evening, afebrile. On room air, lung sounds clear and diminished. Pt continues on amiodarone drip with HR in 80-110s when resting. HR goes up when agitated to 170s. This evening when going to reposition the patient, right upper arm noted to be swollen and cool to touch (please see previous note). Midline removed and amiodarone stopped for an hour while trying to re-establish IV assess. 22 g placed via ultrasound guided and amiodarone restarted. After restarting amiodarone, HR still sustained above 120. Prn metoprolol given x 1 which has been effective, HR now down to 80s - 110s again. PICC order was placed by provider and verbal consent recieved over the telephone by daughter Morelia. PICC stat unable to get here until early AM for placement. Bowel sounds are active, no BM this shift. No urine output this shift, bladder scanned for 327 and 335 this shift.  "

## 2024-05-14 NOTE — PROVIDER NOTIFICATION
Pt noted to have no UOP x22 hours. With pt being straight cathed at 0315 on 5/13 with 600mls out at that time. Bladder scan shows 377mls. MD updated with order for morse to be placed. 450mls out after morse placed.

## 2024-05-14 NOTE — PROGRESS NOTES
While turning/repositioning patient, right upper arm noted to be swollen and red. However when touched and asked about pain pt does not answer or respond, just scares. Per pharmacy, apply heat and if no improvement switch to ice. Heat currently applied.

## 2024-05-14 NOTE — PLAN OF CARE
Goal Outcome Evaluation:      Plan of Care Reviewed With: patient    Overall Patient Progress: improvingOverall Patient Progress: improving         VSS. Afebrile. Pt alert and oriented to self. Pt somewhat cooperative this shift. Pt c/o pain/spasms to L arm. PRN pain medication given per MAR which has been effective. Precedex titrated per MAR. LS diminished but clear. Pt noted to have no void x22 hours and noted to have been straight cathed at previous void. MD made aware with new order to place morse which pt tolerated well. After morse was placed pt became much more cooperative with cares and accepted sips of water and pain medication. HR remains a-fib in the 70s at this time. No concerns at this time.

## 2024-05-14 NOTE — PLAN OF CARE
"Goal Outcome Evaluation:                  This morning patient was on precedex, but was unresponsive to voice, she did open her eyes when her cell phone rang.  Precedex was turned off around 1030.  Patients son, tSeffen called this morning, she was not awake enough to talk to him. Her daughter arrived and she would not wake up or open her eyes for her daughter.  Heart rates controlled in mid 80's on Amiodarone drip.  No oral medications or supplements were given today due to her uncooperativeness vs. Too lethargic.  Low urine output noted and was told to the provider on a couple of different occasions today, Urine output 115 for the day shift, IV fluids in, 1056cc. Oral phosphorous ordered but with her not taking any oral food or fluids, IV has now been ordered but not administered yet.  Around 1330, patients sister and a gentleman arrived, patient was yelling at her to get out, when this writer attempted to approach, she yelled at me to get out as well. Patient verbally abusive, declining oral intake when offered, would not listen to reason. Making statements \"\"get me out of here\" \"they are trying to kill me\" \"Let me lie here and die\".  Now at this time, she is talking on her cell phone.  Report given to the next nurse.      "

## 2024-05-14 NOTE — PROGRESS NOTES
[facility]    Medicine Progress Note - Hospitalist Service    Date of Admission:  5/8/2024    Assessment & Plan   Jayashree Carson is a 73 year old female with past medical history of atrial fibrillation status post Watchman procedure, CVA with left-sided weakness, chronic diastolic CHF, hypertension, COPD, pulmonary hypertension with moderate right sided heart failure, and morbid obesity was transferred from [facility] to [facility] ED via EMS due to increased confusion and generalized fatigue for past few days.   On presentation to the ED patient's vital signs were significant for persistent hypotension and she was found to have an acute kidney injury with creatinine of 3.12.  UA was concerning for possible UTI and patient was started on Rocephin.  A fib with RVR was noted with HR in the 140-150 range.  She was given 2 L of fluids with transient improvement of blood pressure and heart rate but return of hypotension and HR in the 150s and decision was made to admit patient to ICU for ongoing management.  He is currently being managed for A-fib with RVR.  Course complicated by acute encephalopathy slowly improving.      Today's changes:  -Give Bumex 1 mg IV x 1 since the patient is volume up next  -Okay to continue maintenance amiodarone for now  -Since patient is not taking much p.o. intake, she is on dextrose IV infusion for the time being  -Continue to address underlying encephalopathy.      Principal Problem:    Arterial hypotension    SIRS    Assessment: Likely due to combination of volume depletion/dehydration and a fib with RVR initially and continued even after initial corrective efforts secondary to early administration of metoprolol XL.  Improved after extensive fluid resuscitation and amiodarone IV loading and starting of PO metoprolol but patient now refusing to take any medications and HR starting to increase.  Attempted precedex drip and mild  hypotension developed which has now resolved with precedex discontinuation     Plan:   -restart amiodarone drip   -give metoprolol 2.5 mg IV every 6 hours as needed for sustained HR above 120  -Continue holding spironolactone,  Entresto.  -Given that she is clinically hypervolemic, will give Bumex 1 mg IV x 1.  -monitor blood pressures closely to ensure ongoing resolution of hypotension.     Active Problems:    Acute encephalopathy    Paranoia    Assessment:  present with fluctuating orientation and behaviors, fluctuating between hypoactivity to hyperactivity.  Did have several hours when pleasant and cooperative and fairly oriented the morning of 5/12 but when developed paranoia, restlessness and agitation again and patient did not sleep at all overnight or so far today. Procalcitonin added on is still low risk range, patient afebrile.  Compensated metabolic acidosis present but improved from yesterday.  Patient has some cognitive decline at baseline.  With fluctuating levels of consciousness delirium is suspected    Plan:    -start delirium protocol  -start Seroquel 12.5 mg scheduled at bedtime with prn 12.5 mg dose if not working well enough  -continue to monitor for other sources and control as able (HR, Blood pressure, etc)      Acute renal failure superimposed on stage 3a chronic kidney disease, unspecified acute renal failure type (H)    Assessment: Acute renal failure likely due to combination of dehydration due to decreased PO intake and PTA medications. Renal function is rapidly improving with IV hydration: Cr 3.12 down down to 0.9 with urine output improving overall.  Now starting to increase again with patient refusing PO intake today.     Plan:   -Continue holding spironolactone, bumetanide, Entresto.  -restart IV fluids at 75 ml/hr to ensure hydration as patient is refusing to eat   -Continue strict I/O monitoring  -Repeat BMP tomorrow morning       Atrial fibrillation with RVR (H)    Presence of  Watchman left atrial appendage closure device    Assessment: Uncontrolled HR likely exacerbated by dehydration. She received her PTA dose of metoprolol at 4 am today. HR fast despite IV fluids. Patient was given Digoxin 125 mcg IV x1 without change noted.  Amiodarone bolus and drip started with HR decreased to 90-120s and further improved with addition of metoprolol 50 mg BID with patient almost at goal rates, but now refusing all medications and HR inceasing again - when agitation/restless HR increases significantly     Plan:   -Continue amiodarone drip at maintenance dosing  -metoprolol 2.5 mg IV for sustained HR above 120  -no further digoxin at this time.     # Hyperkalemia-resolved    Assessment: Secondary to acute renal failure with initial potassium of 6.1 and improved following kayexalate and IV fluids.  Potassium has now normalized as creatinine is improving towards previous normal.    Plan:   -continue to monitor for ongoing resolution     # Bacteriuria - uncertain if symptomatic or asymptomatic in patient is vague recall    Assessment: Abnormal UA but unknown if patient had been having symptoms recently - was having hesitancy and decreased output but unclear if from infection or worsening NIC.  Procalcitonin low x 2: 0.27, 0.14. Rapidly improving leukocytosis.  Two species of Klebsiella now growing out on UC in patient with persistent hypotension.  Patient has completed a 5 day antibiotic course     Plan:   -no further antibiotics at this time until new infectious source is found     # Increased anion gap metabolic acidosis-anion gap improved.    Assessment: Secondary to acute renal failure. Slowly improving as renal function improves with anion gap now normalized but bicarb is still low and metabolic acidosis present on VBG but bicarb is improving overall and acidosis is compensated today     Plan:   -restart IV fluids given patient's refusal of PO intake     # Chronic diastolic congestive heart  failure  # Chronic right sided systolic heart failure    Assessment:  previous history and noted on echo performed during this hospitalization. Patient is normally on Bumex, spironolactone, and Entresto, all of which have been held since this admission due to persistent hypotension, NIC and dehydration     Plan:    -continue to hold home regimen  -restart IV fluids but only at 75 mL/hr to avoid volume overload  -Give Bumex 1 mg IV x 1    # Essential hypertension    Assessment:  present at baseline with patient on Bumex, metoprolol XL, Entresto and spironolactone at baseline.  Presenting with persistent hypotension as above and home regimen is being held.     Plan:    -continue to hold home regimen for now and monitor for hypotension resolution     # History of CVA with residual left sided weakness  # Neuropathic pain on left side following CVA    Assessment:  previous history, on ASA daily.  Patient has had chronic neuropathic pain since her stroke and was well managed with gabapentin for years but for reasons unknown to patient and family she was weaned off this medication last December and has strugged with the return of this neuropathic pain since.  They are requesting reinitiation of the gabapentin.     Plan:    -Continue gabapentin 100 mg TID as patient will allow.    -continue ASA during this stay  -continue supportive cares     # COPD    Assessment:  previous history with patient on prednisone 5 mg daily and uses prn albuterol for breakthrough symptoms    Plan:    -continue prednisone 5 mg daily     # Pulmonary hypertension    Assessment:  previous history per LTC facility records but patient and family unaware of history, not on oxygen at baseline and no on any medications     Plan:    -monitor for stability with plan as above           Diet: Combination Diet Low Saturated Fat Na <2400mg Diet, No Caffeine Diet    DVT Prophylaxis: Enoxaparin (Lovenox) SQ  Miramontes Catheter: PRESENT, indication: Acute retention  "or obstruction;Adult: deep sedation/paralysis ONLY with ordered RASS goal from -2 to -5  Lines: PRESENT      PICC 05/14/24 Double Lumen Left Brachial vein medial-Site Assessment: WDL      Cardiac Monitoring: ACTIVE order. Indication: ICU  Code Status: Full Code      Clinically Significant Risk Factors          # Hypocalcemia: Lowest Ca = 8.3 mg/dL in last 2 days, will monitor and replace as appropriate   # Hypomagnesemia: Lowest Mg = 1.6 mg/dL in last 2 days, will replace as needed   # Hypoalbuminemia: Lowest albumin = 3.1 g/dL at 5/11/2024  5:18 AM, will monitor as appropriate     # Hypertension: Noted on problem list    # Heart failure with preserved ejection fraction: EF >50% and home medication list includes sacubitril/valsartan (Entresto)       # Severe Obesity: Estimated body mass index is 48.33 kg/m  as calculated from the following:    Height as of this encounter: 1.499 m (4' 11\").    Weight as of this encounter: 108.5 kg (239 lb 4.8 oz).        # Financial/Environmental Concerns: none         Disposition Plan     Medically Ready for Discharge: Anticipated in 2-4 Days      ISMAEL CARBAJAL MD  Hospitalist Service  [facility]  Securely message with [employer] (more info)  Text page via [facility] Paging/Directory   ______________________________________________________________________    Interval History   -Has remained afebrile, hemodynamically stable for the most part   -On telemetry reviewed, she remains in A-fib, rate controlled  -On questioning she denies any acute complaints.  Though today, patient was verbally aggressive towards her sister.    Physical Exam   Vital Signs: Temp: 97.8  F (36.6  C) Temp src: Oral BP: 128/88 Pulse: 98   Resp: (!) 34 SpO2: 92 % O2 Device: None (Room air)    Weight: 239 lbs 4.8 oz      General Appearance: Lying in bed, on supplemental oxygen, appears confused.  HEENT: PERRL: EOMI; moist mucous membrane w/o lesions  Neck: No JVD  Pulmonary: Normal work " of breathing, despite venous up with oxygen.  Lungs are clear in anterior fields bilaterally.  CVS: Irregularly irregular rhythm, tachycardic  GI: BS (+), soft nontender, no rebound or guarding   Skin: No rashes or lesions  Neurologic: Patient is alert and oriented to person, situation.  Able to answer simple questions appropriately.      Medical Decision Making       53 MINUTES SPENT BY ME on the date of service doing chart review, history, exam, documentation & further activities per the note.      Data     I have personally reviewed the following data over the past 24 hrs:    8.6  \   11.2 (L)   / 187     142 117 (H) 23.2 (H) /  141 (H)   4.5 14 (L) 1.04 (H) \     Trop: N/A BNP: 13,796 (H)

## 2024-05-14 NOTE — SEDATION DOCUMENTATION
[place] P[facility]  Procedure Note           Peripherally Inserted Central Line Catheter (PICC):       Jayashree Carson  [id number]   May 14, 2024 Indication: Pressors, poor access           Pause for the cause: Consent for catheter placement procedure signed  Time out completed  Patient ID's verified using two distinct indicators  All necessary equipment is present  Site marked if extremity to be used has been predetermined   Type of line to be used: PICC   Full barrier precautions used: Yes   Skin preparation: Chloraprep   Date of insertion: May 14, 2024   Device type: Double lumen, valved, 5.0   Catheter brand: Clari   Lot number: [id number]   Insertion location: Left brachial vein (medial)   Method of placement: Venipuncture   Number of attempts: With ultrasound: 2   Without ultrasound: 0   Difficulty threading: No   Midline IV device: Dressing dry and intact  Transparent semmipermeable dressing applied  Chlorhexidine patch  Catheter securement device   Arm circumference: Adults 10 cm   Midline extremity circumference: 39 cm   Vein diameter:  4mm    Internal length: 40 cm   Midline visible catheter length: 0 cm   Total catheter length: 40 cm   Tip termination: Low SVC per radiologist. OK to use   Method of verification: Chest x-ray   Midline patency post placement: Positive blood return  Flushes without difficulty  Saline locked   Line flush: Line flush documented on the eMAR   Placement verified by: Physician   Catheter placed by: Dodie Botello RN   Discontinuation form initiated: No   Patient tolerance: Tolerated well  Intradermal injection   PICC Educational information to patient (Information from PICC package):  Unable due to pt's condition   VAD flushing orders entered:  Yes     Summary:  This procedure was performed without difficulty. There were no immediate complications.     RUE PICC attempt on 5/9/24 unsuccessful, could not thread centrally so midline placed instead. Pt had weaned off pressors and midline used  "until \"clotted\" last night per staff nurse. Pt now needs pressors again. Has stroke affected L sided weakness. Night MD did not want to give clearance for PICC placement to LINDY, deferred to primary day MD. Doctor OK'd LUE PICC attempt at 0800 today.   Veins are very frail, PICC threaded well on 2nd access attempt. Pt tolerated well, slept through procedure.       "

## 2024-05-15 LAB
ALBUMIN SERPL BCG-MCNC: 3 G/DL (ref 3.5–5.2)
ALP SERPL-CCNC: 106 U/L (ref 40–150)
ALT SERPL W P-5'-P-CCNC: 24 U/L (ref 0–50)
ANION GAP SERPL CALCULATED.3IONS-SCNC: 10 MMOL/L (ref 7–15)
AST SERPL W P-5'-P-CCNC: 17 U/L (ref 0–45)
BASOPHILS # BLD AUTO: 0 10E3/UL (ref 0–0.2)
BASOPHILS NFR BLD AUTO: 0 %
BILIRUB SERPL-MCNC: 0.7 MG/DL
BUN SERPL-MCNC: 19.8 MG/DL (ref 8–23)
CALCIUM SERPL-MCNC: 8.4 MG/DL (ref 8.8–10.2)
CHLORIDE SERPL-SCNC: 116 MMOL/L (ref 98–107)
CREAT SERPL-MCNC: 1.14 MG/DL (ref 0.51–0.95)
DEPRECATED HCO3 PLAS-SCNC: 16 MMOL/L (ref 22–29)
EGFRCR SERPLBLD CKD-EPI 2021: 50 ML/MIN/1.73M2
EOSINOPHIL # BLD AUTO: 0.4 10E3/UL (ref 0–0.7)
EOSINOPHIL NFR BLD AUTO: 4 %
ERYTHROCYTE [DISTWIDTH] IN BLOOD BY AUTOMATED COUNT: 15.8 % (ref 10–15)
GLUCOSE BLDC GLUCOMTR-MCNC: 94 MG/DL (ref 70–99)
GLUCOSE SERPL-MCNC: 101 MG/DL (ref 70–99)
HCT VFR BLD AUTO: 32.7 % (ref 35–47)
HGB BLD-MCNC: 10.2 G/DL (ref 11.7–15.7)
IMM GRANULOCYTES # BLD: 0.1 10E3/UL
IMM GRANULOCYTES NFR BLD: 1 %
LYMPHOCYTES # BLD AUTO: 1.3 10E3/UL (ref 0.8–5.3)
LYMPHOCYTES NFR BLD AUTO: 15 %
MAGNESIUM SERPL-MCNC: 1.6 MG/DL (ref 1.7–2.3)
MAGNESIUM SERPL-MCNC: 1.8 MG/DL (ref 1.7–2.3)
MCH RBC QN AUTO: 29.7 PG (ref 26.5–33)
MCHC RBC AUTO-ENTMCNC: 31.2 G/DL (ref 31.5–36.5)
MCV RBC AUTO: 95 FL (ref 78–100)
MONOCYTES # BLD AUTO: 0.8 10E3/UL (ref 0–1.3)
MONOCYTES NFR BLD AUTO: 8 %
NEUTROPHILS # BLD AUTO: 6.4 10E3/UL (ref 1.6–8.3)
NEUTROPHILS NFR BLD AUTO: 71 %
NRBC # BLD AUTO: 0 10E3/UL
NRBC BLD AUTO-RTO: 0 /100
PHOSPHATE SERPL-MCNC: 2.3 MG/DL (ref 2.5–4.5)
PHOSPHATE SERPL-MCNC: 2.8 MG/DL (ref 2.5–4.5)
PLATELET # BLD AUTO: 171 10E3/UL (ref 150–450)
PLATELET # BLD AUTO: 186 10E3/UL (ref 150–450)
POTASSIUM SERPL-SCNC: 4.1 MMOL/L (ref 3.4–5.3)
PROT SERPL-MCNC: 5.4 G/DL (ref 6.4–8.3)
RBC # BLD AUTO: 3.43 10E6/UL (ref 3.8–5.2)
SODIUM SERPL-SCNC: 142 MMOL/L (ref 135–145)
WBC # BLD AUTO: 9.1 10E3/UL (ref 4–11)

## 2024-05-15 PROCEDURE — 82040 ASSAY OF SERUM ALBUMIN: CPT | Performed by: INTERNAL MEDICINE

## 2024-05-15 PROCEDURE — 250N000013 HC RX MED GY IP 250 OP 250 PS 637: Performed by: FAMILY MEDICINE

## 2024-05-15 PROCEDURE — 84100 ASSAY OF PHOSPHORUS: CPT | Performed by: INTERNAL MEDICINE

## 2024-05-15 PROCEDURE — 258N000003 HC RX IP 258 OP 636: Performed by: FAMILY MEDICINE

## 2024-05-15 PROCEDURE — 83735 ASSAY OF MAGNESIUM: CPT | Performed by: INTERNAL MEDICINE

## 2024-05-15 PROCEDURE — 250N000011 HC RX IP 250 OP 636: Mod: JZ | Performed by: INTERNAL MEDICINE

## 2024-05-15 PROCEDURE — 200N000001 HC R&B ICU

## 2024-05-15 PROCEDURE — 250N000013 HC RX MED GY IP 250 OP 250 PS 637: Performed by: INTERNAL MEDICINE

## 2024-05-15 PROCEDURE — 258N000003 HC RX IP 258 OP 636: Performed by: INTERNAL MEDICINE

## 2024-05-15 PROCEDURE — 250N000012 HC RX MED GY IP 250 OP 636 PS 637: Performed by: FAMILY MEDICINE

## 2024-05-15 PROCEDURE — 85049 AUTOMATED PLATELET COUNT: CPT | Performed by: FAMILY MEDICINE

## 2024-05-15 PROCEDURE — 250N000009 HC RX 250: Performed by: INTERNAL MEDICINE

## 2024-05-15 PROCEDURE — 85025 COMPLETE CBC W/AUTO DIFF WBC: CPT | Performed by: INTERNAL MEDICINE

## 2024-05-15 PROCEDURE — 99233 SBSQ HOSP IP/OBS HIGH 50: CPT | Performed by: INTERNAL MEDICINE

## 2024-05-15 RX ORDER — MAGNESIUM SULFATE HEPTAHYDRATE 40 MG/ML
2 INJECTION, SOLUTION INTRAVENOUS ONCE
Status: COMPLETED | OUTPATIENT
Start: 2024-05-15 | End: 2024-05-15

## 2024-05-15 RX ORDER — BUMETANIDE 0.25 MG/ML
2 INJECTION INTRAMUSCULAR; INTRAVENOUS ONCE
Status: COMPLETED | OUTPATIENT
Start: 2024-05-15 | End: 2024-05-15

## 2024-05-15 RX ORDER — DIGOXIN 0.25 MG/ML
500 INJECTION INTRAMUSCULAR; INTRAVENOUS ONCE
Status: COMPLETED | OUTPATIENT
Start: 2024-05-15 | End: 2024-05-15

## 2024-05-15 RX ORDER — DIGOXIN 0.25 MG/ML
250 INJECTION INTRAMUSCULAR; INTRAVENOUS EVERY 6 HOURS
Status: DISCONTINUED | OUTPATIENT
Start: 2024-05-15 | End: 2024-05-16

## 2024-05-15 RX ORDER — POLYETHYLENE GLYCOL 3350 17 G/17G
17 POWDER, FOR SOLUTION ORAL DAILY PRN
Status: DISCONTINUED | OUTPATIENT
Start: 2024-05-15 | End: 2024-05-18 | Stop reason: HOSPADM

## 2024-05-15 RX ADMIN — ASPIRIN 81 MG: 81 TABLET, COATED ORAL at 08:54

## 2024-05-15 RX ADMIN — GABAPENTIN 100 MG: 100 CAPSULE ORAL at 20:58

## 2024-05-15 RX ADMIN — Medication 1 TABLET: at 08:53

## 2024-05-15 RX ADMIN — GABAPENTIN 100 MG: 100 CAPSULE ORAL at 15:28

## 2024-05-15 RX ADMIN — ACETAMINOPHEN 1000 MG: 500 TABLET ORAL at 15:28

## 2024-05-15 RX ADMIN — DIGOXIN 250 MCG: 0.25 INJECTION INTRAMUSCULAR; INTRAVENOUS at 18:14

## 2024-05-15 RX ADMIN — POTASSIUM CHLORIDE, DEXTROSE MONOHYDRATE AND SODIUM CHLORIDE: 150; 5; 450 INJECTION, SOLUTION INTRAVENOUS at 08:35

## 2024-05-15 RX ADMIN — ACETAMINOPHEN 1000 MG: 500 TABLET ORAL at 08:52

## 2024-05-15 RX ADMIN — PREDNISONE 5 MG: 5 TABLET ORAL at 08:53

## 2024-05-15 RX ADMIN — GABAPENTIN 100 MG: 100 CAPSULE ORAL at 08:53

## 2024-05-15 RX ADMIN — POLYETHYLENE GLYCOL 3350 17 G: 17 POWDER, FOR SOLUTION ORAL at 15:29

## 2024-05-15 RX ADMIN — FLUTICASONE PROPIONATE 1 SPRAY: 50 SPRAY, METERED NASAL at 09:59

## 2024-05-15 RX ADMIN — DIGOXIN 500 MCG: 0.25 INJECTION INTRAMUSCULAR; INTRAVENOUS at 12:09

## 2024-05-15 RX ADMIN — ACETAMINOPHEN 500 MG: 500 TABLET ORAL at 20:57

## 2024-05-15 RX ADMIN — SODIUM PHOSPHATE, MONOBASIC, MONOHYDRATE AND SODIUM PHOSPHATE, DIBASIC, ANHYDROUS 15 MMOL: 142; 276 INJECTION, SOLUTION INTRAVENOUS at 23:14

## 2024-05-15 RX ADMIN — MAGNESIUM SULFATE HEPTAHYDRATE 2 G: 40 INJECTION, SOLUTION INTRAVENOUS at 21:57

## 2024-05-15 RX ADMIN — BUMETANIDE 2 MG: 0.25 INJECTION INTRAMUSCULAR; INTRAVENOUS at 12:13

## 2024-05-15 RX ADMIN — ATORVASTATIN CALCIUM 40 MG: 40 TABLET, FILM COATED ORAL at 08:53

## 2024-05-15 ASSESSMENT — ACTIVITIES OF DAILY LIVING (ADL)
ADLS_ACUITY_SCORE: 57
ADLS_ACUITY_SCORE: 53
ADLS_ACUITY_SCORE: 57
ADLS_ACUITY_SCORE: 53
ADLS_ACUITY_SCORE: 57
ADLS_ACUITY_SCORE: 53

## 2024-05-15 NOTE — PROGRESS NOTES
[facility]    Medicine Progress Note - Hospitalist Service    Date of Admission:  5/8/2024    Assessment & Plan   Jayashree Carson is a 73 year old female with past medical history of atrial fibrillation status post Watchman procedure, CVA with left-sided weakness, chronic diastolic CHF, hypertension, COPD, pulmonary hypertension with moderate right sided heart failure, and morbid obesity was transferred from [facility] to [facility] ED via EMS due to increased confusion and generalized fatigue for past few days.   On presentation to the ED patient's vital signs were significant for persistent hypotension and she was found to have an acute kidney injury with creatinine of 3.12.  UA was concerning for possible UTI and patient was started on Rocephin.  A fib with RVR was noted with HR in the 140-150 range.  She was given 2 L of fluids with transient improvement of blood pressure and heart rate but return of hypotension and HR in the 150s and decision was made to admit patient to ICU for ongoing management.  He is currently being managed for A-fib with RVR.  Course complicated by acute encephalopathy slowly improving.      Today's changes:  - give bumex 2 mg IV x1  - stop amio gtt  - hold beta-blocker while we diurese  - load with digoxin           # Arterial hypotension-improved  # SIRS    Assessment: Likely due to combination of volume depletion/dehydration and a fib with RVR initially and continued even after initial corrective efforts secondary to early administration of metoprolol XL.  Improved after extensive fluid resuscitation and amiodarone IV loading and starting of PO metoprolol but patient now refusing to take any medications and HR starting to increase.  Attempted precedex drip and mild hypotension developed which has now resolved with precedex discontinuation     Plan:   -Hold amiodarone gtt, as patient is willing to take p.o. intake now  -Planning  on doing digoxin load  -Continue holding spironolactone,  Entresto.  -Given that she is clinically hypervolemic, will give Bumex 1 mg IV x 2.  -monitor blood pressures closely to ensure ongoing resolution of hypotension.     # Acute encephalopathy-improving  # Paranoia    Assessment:  present with fluctuating orientation and behaviors, fluctuating between hypoactivity to hyperactivity.  Did have several hours when pleasant and cooperative, coupled with episodes of being paranoid    Plan:    -Continue delirium protocol  -Okay for acute eye pain as needed nightly  -Wean off precedex gtt     # Acute renal failure superimposed on stage 3a chronic kidney disease, unspecified acute renal failure type (H)  -Acute renal failure initially likely in setting of dehydration with improvement in kidney function following IV fluid administration.  Subsequently, she developed A-fib with RVR and now appears more volume up.  -Clinically, patient does appear to remain volume up  -Baseline creatinine appears to be between 1.1-1.7  -Continue holding spironolactone, Entresto.  -Given adequate p.o. intake, stop IV fluid supplementation.  -Continue Bumex dizziness above.  -Continue strict I/O monitoring    # Atrial fibrillation with RVR (H)  # Presence of Watchman left atrial appendage closure device    Assessment: Uncontrolled HR likely exacerbated by dehydration. She received her PTA dose of metoprolol at 4 am today. HR fast despite IV fluids. Patient was given Digoxin 125 mcg IV x1 without change noted.  Amiodarone bolus and drip started with HR decreased to 90-120s and further improved with addition of metoprolol 50 mg BID with patient almost at goal rates, but now refusing all medications and HR inceasing again - when agitation/restless HR increases significantly     Plan:   -Mgt as above  - holding PTA metoprolol     # Hyperkalemia-resolved    Assessment: Secondary to acute renal failure with initial potassium of 6.1 and improved  following kayexalate and IV fluids.  Potassium has now normalized as creatinine is improving towards previous normal.    Plan:   -continue to monitor for ongoing resolution     # Bacteriuria - uncertain if symptomatic or asymptomatic in patient is vague recall    Assessment: Abnormal UA but unknown if patient had been having symptoms recently - was having hesitancy and decreased output but unclear if from infection or worsening NIC.  Procalcitonin low x 2: 0.27, 0.14. Rapidly improving leukocytosis.  Two species of Klebsiella now growing out on UC in patient with persistent hypotension.  Patient has completed a 5 day antibiotic course     Plan:   -no further antibiotics at this time until new infectious source is found     # Increased anion gap metabolic acidosis-anion gap improved.    Assessment: Secondary to acute renal failure. Slowly improving as renal function improves with anion gap now normalized but bicarb is still low and metabolic acidosis present on VBG but bicarb is improving overall and acidosis is compensated today     Plan:   -Continue to monitor electrolytes.    # Chronic diastolic congestive heart failure  # Chronic right sided systolic heart failure    Assessment:  previous history and noted on echo performed during this hospitalization. Patient is normally on Bumex, spironolactone, and Entresto, all of which have been held since this admission due to persistent hypotension, NIC and dehydration     Plan:    -Give Bumex 2 mg IV x 1    # Essential hypertension    Assessment:  present at baseline with patient on Bumex, metoprolol XL, Entresto and spironolactone at baseline.  Presenting with persistent hypotension as above and home regimen is being held.     Plan:    -continue to hold home regimen for now and monitor for hypotension resolution     # History of CVA with residual left sided weakness  # Neuropathic pain on left side following CVA    Assessment:  previous history, on ASA daily.  Patient  "has had chronic neuropathic pain since her stroke and was well managed with gabapentin for years but for reasons unknown to patient and family she was weaned off this medication last December and has strugged with the return of this neuropathic pain since.  They are requesting reinitiation of the gabapentin.     Plan:    -Continue gabapentin 100 mg TID as patient will allow.    -continue ASA during this stay  -continue supportive cares     # COPD    Assessment:  previous history with patient on prednisone 5 mg daily and uses prn albuterol for breakthrough symptoms    Plan:    -continue prednisone 5 mg daily     # Pulmonary hypertension    Assessment:  previous history per LTC facility records but patient and family unaware of history, not on oxygen at baseline and no on any medications     Plan:    -monitor for stability with plan as above           Diet: Combination Diet Low Saturated Fat Na <2400mg Diet, No Caffeine Diet    DVT Prophylaxis: Enoxaparin (Lovenox) SQ  Miramontes Catheter: PRESENT, indication: Acute retention or obstruction  Lines: PRESENT      PICC 05/14/24 Double Lumen Left Brachial vein medial-Site Assessment: WDL      Cardiac Monitoring: ACTIVE order. Indication: ICU  Code Status: Full Code      Clinically Significant Risk Factors              # Hypoalbuminemia: Lowest albumin = 3 g/dL at 5/15/2024  6:22 AM, will monitor as appropriate     # Hypertension: Noted on problem list    # Heart failure with preserved ejection fraction: EF >50% and home medication list includes sacubitril/valsartan (Entresto)       # Severe Obesity: Estimated body mass index is 48.33 kg/m  as calculated from the following:    Height as of this encounter: 1.499 m (4' 11\").    Weight as of this encounter: 108.5 kg (239 lb 4.8 oz).        # Financial/Environmental Concerns: none         Disposition Plan     Medically Ready for Discharge: Anticipated in 2-4 Days      ISMAEL CARBAJAL MD  Hospitalist Service  [facility] " [facility]  Securely message with [employer] (more info)  Text page via [employer] Paging/Directory   ______________________________________________________________________    Interval History   -Has remained afebrile, hemodynamically stable for the most part   -On telemetry reviewed, she remains in A-fib, intermittent RVR  -She denies any acute complaints.    Physical Exam   Vital Signs: Temp: 97.9  F (36.6  C) Temp src: Oral BP: (!) 164/152 Pulse: 113   Resp: 17 SpO2: 96 % O2 Device: None (Room air)    Weight: 239 lbs 4.8 oz      General Appearance: Lying in bed, on supplemental oxygen, appears confused.  HEENT: PERRL: EOMI; moist mucous membrane w/o lesions  Neck: No JVD  Pulmonary: Normal work of breathing, on supplemental oxygen during evaluation.  CVS: Irregularly irregular rhythm, tachycardic  GI: BS (+), soft nontender, no rebound or guarding   Skin: No rashes or lesions  Neurologic: Patient is alert and oriented to person, situation.  Able to answer simple questions appropriately.      Medical Decision Making       53 MINUTES SPENT BY ME on the date of service doing chart review, history, exam, documentation & further activities per the note.      Data     I have personally reviewed the following data over the past 24 hrs:    9.1  \   10.2 (L)   / 171; 186     142 116 (H) 19.8 /  94   4.1 16 (L) 1.14 (H) \     ALT: 24 AST: 17 AP: 106 TBILI: 0.7   ALB: 3.0 (L) TOT PROTEIN: 5.4 (L) LIPASE: N/A

## 2024-05-15 NOTE — PLAN OF CARE
Problem: Adult Inpatient Plan of Care  Goal: Plan of Care Review  Description: The Plan of Care Review/Shift note should be completed every shift.  The Outcome Evaluation is a brief statement about your assessment that the patient is improving, declining, or no change.  This information will be displayed automatically on your shift  note.  Outcome: Progressing  Goal: Absence of Hospital-Acquired Illness or Injury  Outcome: Progressing  Intervention: Identify and Manage Fall Risk  Recent Flowsheet Documentation  Taken 5/15/2024 0400 by Chris Horton RN  Safety Promotion/Fall Prevention:   activity supervised   increased rounding and observation   increase visualization of patient   nonskid shoes/slippers when out of bed   room door open   room near nurse's station   room organization consistent   safety round/check completed   supervised activity  Taken 5/15/2024 0000 by Chris Horton RN  Safety Promotion/Fall Prevention:   activity supervised   increased rounding and observation   increase visualization of patient   nonskid shoes/slippers when out of bed   room door open   room near nurse's station   room organization consistent   safety round/check completed   supervised activity  Taken 5/14/2024 2000 by Chris Horton RN  Safety Promotion/Fall Prevention:   activity supervised   increased rounding and observation   increase visualization of patient   nonskid shoes/slippers when out of bed   room door open   room near nurse's station   room organization consistent   safety round/check completed   supervised activity  Intervention: Prevent Skin Injury  Recent Flowsheet Documentation  Taken 5/15/2024 0400 by Chris Horton RN  Body Position: position changed independently  Skin Protection: incontinence pads utilized  Device Skin Pressure Protection:   absorbent pad utilized/changed   tubing/devices free from skin contact  Taken 5/15/2024 0000 by Chris Horton RN  Body Position: position changed  independently  Skin Protection: incontinence pads utilized  Device Skin Pressure Protection:   absorbent pad utilized/changed   tubing/devices free from skin contact  Taken 5/14/2024 2000 by Chris Horton RN  Body Position: position changed independently  Skin Protection: incontinence pads utilized  Device Skin Pressure Protection:   absorbent pad utilized/changed   tubing/devices free from skin contact  Intervention: Prevent and Manage VTE (Venous Thromboembolism) Risk  Recent Flowsheet Documentation  Taken 5/15/2024 0400 by Chris Horton RN  VTE Prevention/Management: patient refused intervention  Taken 5/15/2024 0000 by Chris Horton RN  VTE Prevention/Management: patient refused intervention  Taken 5/14/2024 2000 by Chris Horton RN  VTE Prevention/Management: patient refused intervention  Intervention: Prevent Infection  Recent Flowsheet Documentation  Taken 5/15/2024 0400 by Chris Horton RN  Infection Prevention:   rest/sleep promoted   single patient room provided  Taken 5/15/2024 0000 by Chris Horton RN  Infection Prevention:   rest/sleep promoted   single patient room provided  Taken 5/14/2024 2000 by Chris Horton RN  Infection Prevention:   rest/sleep promoted   single patient room provided  Goal: Optimal Comfort and Wellbeing  Outcome: Progressing  Intervention: Provide Person-Centered Care  Recent Flowsheet Documentation  Taken 5/15/2024 0400 by Chris Horton RN  Trust Relationship/Rapport:   care explained   reassurance provided  Taken 5/15/2024 0000 by Chris Horton RN  Trust Relationship/Rapport:   care explained   reassurance provided  Taken 5/14/2024 2000 by Chris Horton RN  Trust Relationship/Rapport:   care explained   reassurance provided  Goal: Readiness for Transition of Care  Outcome: Progressing     Problem: Risk for Delirium  Goal: Optimal Coping  Outcome: Progressing  Intervention: Optimize Psychosocial Adjustment to Delirium  Recent Flowsheet Documentation  Taken  5/15/2024 0400 by Chris Horton RN  Supportive Measures:   active listening utilized   positive reinforcement provided  Taken 5/15/2024 0000 by Chris Horton RN  Supportive Measures:   active listening utilized   positive reinforcement provided  Taken 5/14/2024 2000 by Chris Horton RN  Supportive Measures:   active listening utilized   positive reinforcement provided  Goal: Improved Behavioral Control  Outcome: Progressing  Intervention: Minimize Safety Risk  Recent Flowsheet Documentation  Taken 5/15/2024 0400 by Chris Horton RN  Communication Enhancement Strategies: call light answered in person  Enhanced Safety Measures:   pain management   review medications for side effects with activity   room near unit station  Trust Relationship/Rapport:   care explained   reassurance provided  Taken 5/15/2024 0000 by Chris Horton RN  Communication Enhancement Strategies: call light answered in person  Enhanced Safety Measures:   pain management   review medications for side effects with activity   room near unit station  Trust Relationship/Rapport:   care explained   reassurance provided  Taken 5/14/2024 2000 by Chris Horton RN  Communication Enhancement Strategies: call light answered in person  Enhanced Safety Measures:   pain management   review medications for side effects with activity   room near unit station  Trust Relationship/Rapport:   care explained   reassurance provided  Goal: Improved Attention and Thought Clarity  Outcome: Progressing  Intervention: Maximize Cognitive Function  Recent Flowsheet Documentation  Taken 5/15/2024 0400 by Chris Horton RN  Sensory Stimulation Regulation: auditory stimulation minimized  Reorientation Measures: clock in view  Taken 5/15/2024 0000 by Chris Horton RN  Sensory Stimulation Regulation: auditory stimulation minimized  Reorientation Measures: clock in view  Taken 5/14/2024 2000 by Chris Horton RN  Sensory Stimulation Regulation: auditory stimulation  minimized  Reorientation Measures: clock in view  Goal: Improved Sleep  Outcome: Progressing     Problem: Fall Injury Risk  Goal: Absence of Fall and Fall-Related Injury  Outcome: Progressing  Intervention: Identify and Manage Contributors  Recent Flowsheet Documentation  Taken 5/15/2024 0400 by Chris Horton RN  Medication Review/Management: medications reviewed  Taken 5/15/2024 0000 by Chris Horton RN  Medication Review/Management: medications reviewed  Taken 5/14/2024 2000 by Chris Horton RN  Medication Review/Management: medications reviewed  Intervention: Promote Injury-Free Environment  Recent Flowsheet Documentation  Taken 5/15/2024 0400 by Chris Horton RN  Safety Promotion/Fall Prevention:   activity supervised   increased rounding and observation   increase visualization of patient   nonskid shoes/slippers when out of bed   room door open   room near nurse's station   room organization consistent   safety round/check completed   supervised activity  Taken 5/15/2024 0000 by Chris Horton RN  Safety Promotion/Fall Prevention:   activity supervised   increased rounding and observation   increase visualization of patient   nonskid shoes/slippers when out of bed   room door open   room near nurse's station   room organization consistent   safety round/check completed   supervised activity  Taken 5/14/2024 2000 by Chris Horton RN  Safety Promotion/Fall Prevention:   activity supervised   increased rounding and observation   increase visualization of patient   nonskid shoes/slippers when out of bed   room door open   room near nurse's station   room organization consistent   safety round/check completed   supervised activity     Problem: Pain Acute  Goal: Optimal Pain Control and Function  Outcome: Progressing  Intervention: Prevent or Manage Pain  Recent Flowsheet Documentation  Taken 5/15/2024 0400 by Chris Horton RN  Sensory Stimulation Regulation: auditory stimulation minimized  Medication  Review/Management: medications reviewed  Taken 5/15/2024 0000 by Chris Horton RN  Sensory Stimulation Regulation: auditory stimulation minimized  Medication Review/Management: medications reviewed  Taken 5/14/2024 2000 by Chris Horton RN  Sensory Stimulation Regulation: auditory stimulation minimized  Medication Review/Management: medications reviewed  Intervention: Optimize Psychosocial Wellbeing  Recent Flowsheet Documentation  Taken 5/15/2024 0400 by Chris Horton RN  Supportive Measures:   active listening utilized   positive reinforcement provided  Taken 5/15/2024 0000 by Chris Horton RN  Supportive Measures:   active listening utilized   positive reinforcement provided  Taken 5/14/2024 2000 by Chris Horton RN  Supportive Measures:   active listening utilized   positive reinforcement provided     Problem: Gas Exchange Impaired  Goal: Optimal Gas Exchange  Outcome: Progressing  Intervention: Optimize Oxygenation and Ventilation  Recent Flowsheet Documentation  Taken 5/15/2024 0400 by Chris Horton RN  Head of Bed (HOB) Positioning: HOB at 20-30 degrees  Taken 5/15/2024 0000 by Chris Horton RN  Head of Bed (HOB) Positioning: HOB at 20-30 degrees  Taken 5/14/2024 2000 by Chris Horton RN  Head of Bed (HOB) Positioning: HOB at 20-30 degrees     Problem: Infection  Goal: Absence of Infection Signs and Symptoms  Outcome: Progressing     Problem: Oral Intake Inadequate  Goal: Improved Oral Intake  Outcome: Progressing       Pt remains alert and oriented, uses the call light as advised, she has been agreeable and cooperative with all interaction, Amiodarone infusion continues at 0.5 mg an hour with stable heart rate and stable blood pressures. Urine output has been adequate, she denies pain, lovenox was refused.   Will continue to monitor telemetry, blood pressures, titrate amiodarone infusion as needed and follow plan of care.

## 2024-05-15 NOTE — PLAN OF CARE
Goal Outcome Evaluation:                      Patient is overall progressing today. She has been calm and cooperative the entire day shift. Taking oral medications with water. Did get her on a bed pan this morning for her to have a BM, no results. This afternoon she requested Miralax, and order was obtained and administered. She did eat a few bites of breakfast and  a few bites of lunch. Alert when spoken to and oriented. Did note some expiratory wheezes this morning when the provider made his rounds, Amiodarone drip and IV fluids were turned off about 1130 this morning and have remained off. IV Digoxin was given and IV Bumex was given with good urine output noted.  Vitals have remained stable, telemetry continues to show a-fib with heart rates 's. Miramontes in place and patent.

## 2024-05-16 PROBLEM — F22 PARANOIA (H): Status: ACTIVE | Noted: 2024-05-16

## 2024-05-16 PROBLEM — I50.812 CHRONIC RIGHT HEART FAILURE (H): Status: ACTIVE | Noted: 2024-05-16

## 2024-05-16 PROBLEM — G93.40 ENCEPHALOPATHY: Status: ACTIVE | Noted: 2024-05-16

## 2024-05-16 PROBLEM — M79.2 NEUROPATHIC PAIN: Status: ACTIVE | Noted: 2024-05-16

## 2024-05-16 LAB
ANION GAP SERPL CALCULATED.3IONS-SCNC: 9 MMOL/L (ref 7–15)
CHLORIDE SERPL-SCNC: 112 MMOL/L (ref 98–107)
DEPRECATED HCO3 PLAS-SCNC: 18 MMOL/L (ref 22–29)
DIGOXIN SERPL-MCNC: 2.3 NG/ML (ref 0.6–2)
HOLD SPECIMEN: NORMAL
MAGNESIUM SERPL-MCNC: 2.2 MG/DL (ref 1.7–2.3)
PHOSPHATE SERPL-MCNC: 3.6 MG/DL (ref 2.5–4.5)
POTASSIUM SERPL-SCNC: 4.5 MMOL/L (ref 3.4–5.3)
SODIUM SERPL-SCNC: 139 MMOL/L (ref 135–145)

## 2024-05-16 PROCEDURE — 250N000013 HC RX MED GY IP 250 OP 250 PS 637: Performed by: PEDIATRICS

## 2024-05-16 PROCEDURE — 250N000013 HC RX MED GY IP 250 OP 250 PS 637: Performed by: FAMILY MEDICINE

## 2024-05-16 PROCEDURE — 250N000012 HC RX MED GY IP 250 OP 636 PS 637: Performed by: FAMILY MEDICINE

## 2024-05-16 PROCEDURE — 120N000001 HC R&B MED SURG/OB

## 2024-05-16 PROCEDURE — 36415 COLL VENOUS BLD VENIPUNCTURE: CPT | Performed by: INTERNAL MEDICINE

## 2024-05-16 PROCEDURE — 99418 PROLNG IP/OBS E/M EA 15 MIN: CPT | Performed by: PEDIATRICS

## 2024-05-16 PROCEDURE — 99233 SBSQ HOSP IP/OBS HIGH 50: CPT | Performed by: PEDIATRICS

## 2024-05-16 PROCEDURE — 83735 ASSAY OF MAGNESIUM: CPT | Performed by: INTERNAL MEDICINE

## 2024-05-16 PROCEDURE — 82374 ASSAY BLOOD CARBON DIOXIDE: CPT | Performed by: PEDIATRICS

## 2024-05-16 PROCEDURE — 80162 ASSAY OF DIGOXIN TOTAL: CPT | Performed by: PEDIATRICS

## 2024-05-16 PROCEDURE — 84100 ASSAY OF PHOSPHORUS: CPT | Performed by: INTERNAL MEDICINE

## 2024-05-16 RX ORDER — BUMETANIDE 0.5 MG/1
1 TABLET ORAL DAILY
Status: DISCONTINUED | OUTPATIENT
Start: 2024-05-17 | End: 2024-05-16

## 2024-05-16 RX ORDER — BUMETANIDE 0.5 MG/1
1 TABLET ORAL DAILY
Status: DISCONTINUED | OUTPATIENT
Start: 2024-05-16 | End: 2024-05-18 | Stop reason: HOSPADM

## 2024-05-16 RX ORDER — METOPROLOL TARTRATE 25 MG/1
25 TABLET, FILM COATED ORAL 2 TIMES DAILY
Status: DISCONTINUED | OUTPATIENT
Start: 2024-05-16 | End: 2024-05-18 | Stop reason: HOSPADM

## 2024-05-16 RX ADMIN — ACETAMINOPHEN 1000 MG: 500 TABLET ORAL at 20:49

## 2024-05-16 RX ADMIN — Medication 1 TABLET: at 09:20

## 2024-05-16 RX ADMIN — POLYETHYLENE GLYCOL 3350 17 G: 17 POWDER, FOR SOLUTION ORAL at 12:18

## 2024-05-16 RX ADMIN — GABAPENTIN 100 MG: 100 CAPSULE ORAL at 09:20

## 2024-05-16 RX ADMIN — QUETIAPINE FUMARATE 12.5 MG: 25 TABLET ORAL at 20:58

## 2024-05-16 RX ADMIN — ASPIRIN 81 MG: 81 TABLET, COATED ORAL at 09:20

## 2024-05-16 RX ADMIN — GABAPENTIN 100 MG: 100 CAPSULE ORAL at 13:39

## 2024-05-16 RX ADMIN — ACETAMINOPHEN 500 MG: 500 TABLET ORAL at 16:11

## 2024-05-16 RX ADMIN — SENNOSIDES AND DOCUSATE SODIUM 1 TABLET: 8.6; 5 TABLET ORAL at 09:19

## 2024-05-16 RX ADMIN — GABAPENTIN 100 MG: 100 CAPSULE ORAL at 20:49

## 2024-05-16 RX ADMIN — BUMETANIDE 1 MG: 0.5 TABLET ORAL at 17:39

## 2024-05-16 RX ADMIN — ACETAMINOPHEN 1000 MG: 500 TABLET ORAL at 09:18

## 2024-05-16 RX ADMIN — PREDNISONE 5 MG: 5 TABLET ORAL at 09:20

## 2024-05-16 RX ADMIN — QUETIAPINE FUMARATE 12.5 MG: 25 TABLET ORAL at 01:33

## 2024-05-16 RX ADMIN — METOPROLOL TARTRATE 25 MG: 25 TABLET, FILM COATED ORAL at 09:20

## 2024-05-16 RX ADMIN — FLUTICASONE PROPIONATE 1 SPRAY: 50 SPRAY, METERED NASAL at 09:30

## 2024-05-16 RX ADMIN — METOPROLOL TARTRATE 25 MG: 25 TABLET, FILM COATED ORAL at 20:49

## 2024-05-16 RX ADMIN — ATORVASTATIN CALCIUM 40 MG: 40 TABLET, FILM COATED ORAL at 09:20

## 2024-05-16 ASSESSMENT — ACTIVITIES OF DAILY LIVING (ADL)
ADLS_ACUITY_SCORE: 57

## 2024-05-16 NOTE — PLAN OF CARE
"Goal Outcome Evaluation:    Inadequate Oral Intake: ongoing, progressing very slowly. Had very frequent meal refusals earlier in stay, was mostly only tolerating a few bites of food. Overall status has improved within the past 2 days - did have 25% of meals yesterday, yogurt this AM, about 25% of lunch this afternoon.    Diet changed from cardiac to 2 g sodium diet today.    No GI concerns noted. Did have constipation yesterday but had a BM after miralax.     Weight is up overall - edema vs. bed scale error. Transferred to med surg room so will see if bed scale weight is different.    Did not like Ensure so d/c'd on 5/13. Will try Magic cup BID to see if pt accepts, tolerates.     Now that mentation has cleared, will list pt as \"appropriate for room service.\" Added preferences to [facility]. Will change back to non-select trays if needed.    Delaney Hernandez RD, LD  Clinical Dietitian  Office: [phone]  Weekend pager: [phone]  "

## 2024-05-16 NOTE — PROGRESS NOTES
S-(situation): status update    B-(background): patient here with a-fib    A-(assessment): Patient has remained in an a-fib heart rhythm, but now through the night, heart rate has been bradycardic in 50-60's.  Provider was made aware. Blood pressure soft this morning 100/69 with an adequate MAP of 80. Patient quite sleepy this morning, but did awaken when spoken to for morning medications. States her feet are even feeling better this morning. She was pleasant and cooperative, asked me to throw her Dr. Pepper soda bottle away because it had been there a few says (since Sunday), and asked me to water her plant.  She was agreeable to take her oral medications, and ate her yogurt off of her breakfast tray, which is more than she has eaten in recent days.    Patient continues to have Miramontes catheter in place, is patent and draining yellow urine.    PICC line in place and is saline locked, Peripheral IV in place and saline locked. Currently no fluids are infusing.     R-(recommendations): Per Dr. Dominguez, patient can transition out of ICU today to the med/surg floor on telemetry.

## 2024-05-16 NOTE — PLAN OF CARE
Goal Outcome Evaluation:      Plan of Care Reviewed With: patient    Overall Patient Progress: improvingOverall Patient Progress: improving    Outcome Evaluation: 4 hour shift note: A&Ox4. VSS on RA. Bmx1. Ambulating with sarasteady. Complained of 7/10 leg pain/burning (due to neuropathy),only wanted 500mg out of the 1,000 ordered of scheduled tylenol. 1mg PO bumex ordered and administered this evening. Miramontes output 125 mL.

## 2024-05-16 NOTE — PLAN OF CARE
Goal Outcome Evaluation:      Plan of Care Reviewed With: patient          Outcome Evaluation: Patient is alert and oriented x 4, not sure of exact date but knew it is May and 2024.  Edema in feet bilaterally continues but is improving over yesterday.  Spirits are better than I have seen since admission, no angry outbursts yestarday or today thus far.  Appetite is improving, ate yogurt for breakfast, ate yogurt, sweeet potatoes and a few bites of chicken for lunch.  Cooperative with washing her face, brushing her hair and she was able to brush her own teeth after getting set up to do so. Will be moving to the med/surg floor this afternoon, no longer in ICU status.  Dr. Dominguez even made mention of possibly returning to [facility] this weekend if she continues to improve.  Bradycardic with a-fib today, provider aware.

## 2024-05-16 NOTE — PROGRESS NOTES
[facility]    Medicine Progress Note - Hospitalist Service    Date of Admission:  5/8/2024    Assessment & Plan   Jayashree Carson is a 73 year old female with chronic atrial fibrillation status post Watchman procedure, previous CVA with residual chronic left-sided hemiparesis, chronic heart failure with preserved LVEF and moderate chronic right heart failure, pulmonary hypertension, COPD, hypertension, and morbid obesity who resides at long-term care facility and was transferred to the ER due to increased confusion and generalized fatigue and was admitted to ICU due to concerns for atrial fibrillation with RVR, hypotension, acute renal failure with hyperkalemia, and acute encephalopathy.       Chronic atrial fibrillation with RVR with severe hypotension, SIRS, and increased anion gap metabolic acidosis  Presence of Watchman left atrial appendage closure device  Known chronic atrial fibrillation and presented with RVR heart rate 150s and severe hypotension with SIRS.  She also presented with increased anion gap metabolic acidosis probably due at least partly to severe hypotension although lactic acid was normal.  Atrial fibrillation is chronically treated with metoprolol for rate control.  Severe hypotension with SIRS was attributed to the combination of rapid atrial fibrillation, hypovolemia (chronically takes diuretics), and antihypertensive effect of other previous chronic medication including Entresto as well as metoprolol and diuretics.  Initial treatment for rapid atrial fibrillation included digoxin 125 mcg IV x1 without change followed by amiodarone bolus and continuous infusion.  Initial treatment for severe hypotension included aggressive IV fluid resuscitation and withholding chronic diuretics and antihypertensive medications.  Subsequent treatment for atrial fibrillation was complicated by behavioral disturbance from encephalopathy at which time she was repeatedly refusing  doses of oral medications.   Heart rate improved and has remained well-controlled after stopping amiodarone and resuming IV digoxin load.  Serum digoxin level is mildly supratherapeutic today 5/16. As she has improved, she is now reliably taking oral medications.   -Not continuing digoxin today due to supratherapeutic serum level but anticipate restarting in the next 1 to 2 days as level improves  -Resume metoprolol tartrate but at lower dose 25 mg twice daily and not planning to resume Toprol-XL  -Continue cardiac monitoring while actively adjusting medications for rate control of atrial fibrillation but may transfer to floor today  -Not yet restarting previous chronic Entresto because rate control of atrial fibrillation appears to be the clinical priority  -Gradually restarting diuretics as long as blood pressure is stable    Acute encephalopathy with paranoia and behavioral disturbance  Old stroke with encephalomalacia, left hemiparesis, and left neuropathic pain  Aspirin induced platelet function defect  Presented with confusion and fluctuating orientation.  During hospitalization, has had intermittent behavioral fluctuating between hypoactivity to hyperactivity as well as episodes of paranoia.  She did not tolerate Precedex infusion due to hypotension.  Behavioral disturbance and paranoia were associated with refusal to take oral medications including her chronic medications.  She has not had overt new focal neurologic deficit or seizure activity and head CT was negative for acute abnormalities but incidental note of multiple areas of encephalomalacia.  Clinical course has not been concerning for acute psychosis.  After investigation, an acute encephalopathy primarily due to cerebral hypoperfusion from rapid atrial fibrillation with hypotension superimposed upon chronic encephalomalacia after old stroke is suspected.  However, severe uremia may have also contributed to encephalopathy.  As her hemodynamic status  has improved, signs of encephalopathy are subsiding and her behavior has become cooperative.  She has known chronic left hemiparesis after old stroke along with left-sided neuropathic pain.  Low-dose gabapentin was restarted during this hospitalization for treatment of her neuropathic pain.  Chronically taking aspirin after previous stroke which causes platelet function defect.  Active bleeding has not been suspected during hospitalization.  -Continue supportive measures  -Continue low-dose gabapentin  -Continue chronic dose of aspirin  -May start to advance activity as tolerated    Acute renal failure superimposed on stage 3a chronic kidney disease with hyperkalemia and metabolic acidosis  Presented with creatinine 3.12, increased compared with baseline creatinine of approximately 1.1-1.7 (stage IIIa CKD) concerning for acute renal failure.  Acute renal failure was associated with moderate hyperkalemia potassium 6.1 and severe uremia with  as well as metabolic acidosis.  After holding diuretics and Entresto, treatment with Kayexalate and IV fluids, restoration of normotension, hyperkalemia and severe uremia resolved and renal function recovered to baseline.  She continues to have metabolic acidosis with normal anion gap probably now exacerbated by hyperchloremia most likely iatrogenic from initial aggressive IV fluid resuscitation.  -Continue holding spironolactone and Entresto until metabolic acidosis has resolved and rate control of atrial fibrillation has been optimized after titrating dosing of metoprolol  -Not anticipating additional IV fluid treatment at this time  -Continue strict I/O monitoring  -Ordered recheck of renal function and blood gas    Hypomagnesemia  Hypoalbuminemia  Serum magnesium low at 1.6 when initially tested on May 13, improved after supplementation.  Presented with normal serum albumin that subsequently became low during hospitalization likely due to inadequate oral nutritional  intake.  -Monitor magnesium and provide supplementation as indicated per protocol  -Monitor albumin as indicated    Klebsiella bacteriuria  Presented with abnormal UA including pyuria without known recent symptoms suspicious for UTI.  Initial urinary hesitancy and oliguria may have been due to acute renal failure rather than infection.  Although she presented with SIRS criteria including leukocytosis, procalcitonin was low, lactic acid was normal, and leukocytosis rapidly resolved, so sepsis has not been suspected.  Two species of Klebsiella grew on UC and she completed a 5 day empiric antibiotic treatment course during hospitalization.  -no further antibiotic treatment anticipated for this problem    Chronic heart failure with preserved LVEF and chronic right heart failure  Pulmonary hypertension  Previous history of chronic heart failure with normal LV EF but dilated RV with decreased RV systolic function noted on echo during this hospitalization confirming chronic right heart failure.  Echocardiogram also demonstrated findings suspicious for pulmonary hypertension.  Chronic heart failure had previously been treated with Bumex, spironolactone, and Entresto, all of which were held due to persistent hypotension and acute renal failure with hyperkalemia.  After treatment with aggressive fluid resuscitation, she developed volume overload that has been treated with doses of IV Bumex, but her chronic oral diuretic therapy has not yet been restarted.  Weight during hospitalization is significantly increased compared with recent weights of 213 to 218 pounds in early May from the nursing home, but as of 5/16 she appears euvolemic.  -Restart oral Bumex but at lower dose of 1 mg daily  -Continue to monitor daily weights, intake, and output  -Not restarting spironolactone or Entresto at this time    Essential hypertension  Chronic hypertension previously treated with Bumex, metoprolol XL, Entresto and spironolactone.   "Presented with severe hypotension and these chronic medications were initially held.  Hypotension has resolved and blood pressure is gradually starting to trend upward.   -Restarting lower dose metoprolol as above  -Restarting lower dose Bumex as above  -continue to hold Entresto and spironolactone    COPD  Previous history of COPD treated chronically with prednisone 5 mg daily and uses albuterol prn for breakthrough symptoms.  She does not appear to have been treated with stress doses of corticosteroids during this hospitalization.  -continue prednisone 5 mg daily     Morbid obesity  Appears obese with BMI 47 concerning for morbid obesity.  -No acute intervention          Diet: Combination Diet Low Saturated Fat Na <2400mg Diet, No Caffeine Diet    DVT Prophylaxis: Enoxaparin (Lovenox) SQ  Miramontes Catheter: PRESENT, indication: Acute retention or obstruction  Lines: PRESENT      PICC 05/14/24 Double Lumen Left Brachial vein medial-Site Assessment: WDL      Cardiac Monitoring: ACTIVE order. Indication: ICU  Code Status: Full Code      Clinically Significant Risk Factors            # Hypomagnesemia: Lowest Mg = 1.6 mg/dL in last 2 days, will replace as needed   # Hypoalbuminemia: Lowest albumin = 3 g/dL at 5/15/2024  6:22 AM, will monitor as appropriate     # Hypertension: Noted on problem list  # Heart failure with preserved ejection fraction: EF >50% and home medication list includes sacubitril/valsartan (Entresto)       # Severe Obesity: Estimated body mass index is 47 kg/m  as calculated from the following:    Height as of this encounter: 1.499 m (4' 11\").    Weight as of this encounter: 105.6 kg (232 lb 11.2 oz).      # Financial/Environmental Concerns: none         Disposition Plan     Medically Ready for Discharge: Anticipated in 2-4 Days             Saturnino Dominguez MD  Hospitalist Service  [facility]  Securely message with I[employer] (more info)  Text page via [facility] Paging/Directory "   ______________________________________________________________________    Interval History   Patient developed lower heart rates overnight and therefore did not receive scheduled doses of IV digoxin overnight.  Otherwise, there were no significant overnight events.  She remains afebrile with mostly normal heart rate overnight.  Blood pressure is fluctuated from normotensive to mildly hypertensive over the last day.  She had good urine output after doses of IV Bumex yesterday.  She generally has maintained normal oxygenation not requiring oxygen supplementation with only an intermittent transient low oxygen saturation reading.  She denies shortness of breath.  She denies dizziness.  She generally feels weak.  She is starting to tolerate oral intake.    Physical Exam   Vital Signs: Temp: 98.8  F (37.1  C) Temp src: Oral BP: 119/74 Pulse: 82   Resp: 13 SpO2: (!) 87 % O2 Device: None (Room air)    Patient Vitals for the past 24 hrs:   BP Temp Temp src Pulse Resp SpO2 Weight   05/16/24 0918 -- 98.8  F (37.1  C) -- -- -- -- --   05/16/24 0537 -- -- -- -- -- -- 105.6 kg (232 lb 11.2 oz)   05/16/24 0300 119/74 -- -- 82 13 (!) 87 % --   05/16/24 0200 (!) 145/83 -- -- 79 13 94 % --   05/16/24 0100 133/73 -- -- 78 16 95 % --   05/16/24 0000 138/89 -- -- 86 22 95 % --   05/15/24 2300 136/86 99  F (37.2  C) Oral 75 18 94 % --   05/15/24 2201 (!) 120/92 -- -- 58 16 94 % --   05/15/24 2100 126/84 -- -- 96 11 93 % --   05/15/24 2000 113/74 -- -- 82 20 95 % --   05/15/24 1900 124/78 97.8  F (36.6  C) Oral 94 17 94 % --   05/15/24 1837 -- -- -- 89 22 94 % --   05/15/24 1815 130/80 -- -- 115 16 -- --   05/15/24 1700 115/84 -- -- 87 27 92 % --   05/15/24 1600 128/78 -- -- 94 24 93 % --   05/15/24 1500 126/64 97.7  F (36.5  C) Oral 95 15 93 % --   05/15/24 1400 125/83 -- -- 99 15 94 % --   05/15/24 1300 (!) 133/95 -- -- 114 18 90 % --   05/15/24 1245 (!) 121/92 -- -- 88 27 90 % --   05/15/24 1205 93/50 97.9  F (36.6  C) Oral (!) 124  16 90 % --   05/15/24 1100 103/72 -- -- (!) 124 22 94 % --   05/15/24 1055 102/77 -- -- (!) 122 22 93 % --   05/15/24 1000 116/58 -- -- 99 21 96 % --     Weight: 232 lbs 11.2 oz  Vitals:    05/10/24 0600 05/11/24 0500 05/13/24 1014 05/14/24 0600   Weight: 101.9 kg (224 lb 9.6 oz) 101.4 kg (223 lb 9.6 oz) 105.3 kg (232 lb 1.6 oz) 108.5 kg (239 lb 4.8 oz)    05/16/24 0537   Weight: 105.6 kg (232 lb 11.2 oz)       Intake/Output Summary (Last 24 hours) at 5/16/2024 0955  Last data filed at 5/16/2024 0918  Gross per 24 hour   Intake 1050 ml   Output 2915 ml   Net -1865 ml       General Appearance: Morbidly obese appearing elderly woman without signs of acute distress while resting in bed  Respiratory: Normal respiratory effort, diminished breath sounds, clear lungs  Cardiovascular: Irregularly irregular heart rate and rhythm, good radial pulse, normal capillary refill, no definite peripheral edema though difficult to assess because of her obesity  GI: Obese abdomen, soft  Skin: Pale color, no rash  Neuro: Alert and maintains wakefulness and attention, seems to answer questions appropriately and is able to follow simple instructions, normal speech, obvious left hemiparesis with contractures in the left hand    Medical Decision Making       66 MINUTES SPENT BY ME on the date of service doing chart review, history, exam, documentation & further activities per the note.      Data     I have personally reviewed the following data over the past 24 hrs:    N/A  \   N/A   / N/A     139 112 (H) N/A /  N/A   4.5 18 (L) N/A \       Magnesium 2.2, phosphorus 3.6  Digoxin level 2.3    Cardiac monitoring results reviewed over the past 24 hrs: Atrial fibrillation, no other arrhythmias    Reviewed results of echocardiogram performed May 10: Normal LVEF 60 to 65%, moderately dilated RV with moderately decreased RV systolic function, RV SP approximate 49+ right atrial pressure, IVC diameter greater than 2.1 cm collapsing less than 50%  with chayo    Recent Labs   Lab 05/16/24  0518 05/15/24  0838 05/15/24  0622 05/14/24  0513 05/13/24  0816   WBC  --   --  9.1 8.6 12.1*   HGB  --   --  10.2* 11.2* 11.7   MCV  --   --  95 94 95   PLT  --   --  186  171 187 203     --  142 142 141   POTASSIUM 4.5  --  4.1 4.5 4.4   CHLORIDE 112*  --  116* 117* 117*   CO2 18*  --  16* 14* 15*   BUN  --   --  19.8 23.2* 27.0*   CR  --   --  1.14* 1.04* 1.07*   ANIONGAP 9  --  10 11 9   ALIDA  --   --  8.4* 8.3* 8.8   GLC  --  94 101* 141* 107*   ALBUMIN  --   --  3.0*  --  3.2*   PROTTOTAL  --   --  5.4*  --  6.2*   BILITOTAL  --   --  0.7  --  0.5   ALKPHOS  --   --  106  --  116   ALT  --   --  24  --  34   AST  --   --  17  --  22

## 2024-05-16 NOTE — PLAN OF CARE
"Goal Outcome Evaluation:      Plan of Care Reviewed With: patient    Overall Patient Progress: improvingOverall Patient Progress: improving    Outcome Evaluation: Pt. A&Ox 4. VSS on RA. Denies pain. Edema continues 2+ in bilateral ankles. Transitioned to IV digoxin, held both midnight at 0600 doses d/t HR going below 60 BPM, overnight MD made aware, order t hold doses. Pt. requested a sleep aid, PRN Seroquel given, slept well after receiving dose. Repositioned q2-3 hrs. No labs ordered this AM, overnight MD made aware, replied \"noted\", no new orders received.      "

## 2024-05-16 NOTE — PROGRESS NOTES
"CLINICAL NUTRITION SERVICES - REASSESSMENT NOTE     Nutrition Prescription    RECOMMENDATIONS FOR MDs/PROVIDERS TO ORDER:  None at this time    Malnutrition Status:    Unable to adequately assess due to lack of in-depth NFPE   *likely at least moderate malnutrition per obs NFPE, will conduct full NFPE as able    Recommendations already ordered by Registered Dietitian (RD):  Will offer Magic cup BID with lunch and dinner - plan to adjust based on pt preference/tolerance     Food preferences provided - now that pt has cleared mentally, will list pt as \"appropriate for room service\" in [employer] to ensure meals are provided that pt likes. Will switch back to non-select trays if needed.    Multivitamin with minerals - please continue as ordered    Future/Additional Recommendations:  Will continue to follow to monitor oral intake, weight changes, GI symptoms/BMs and nutritional supplement tolerance     EVALUATION OF THE PROGRESS TOWARD GOALS   Diet: Orders Placed This Encounter      Combination Diet Low Saturated Fat Na <2400mg Diet, No Caffeine Diet  *diet changed to 2 g sodium diet this afternoon    Nutrition Support: N/A  Intake: 25% consistently yesterday 5/15. Prior to that, was only taking a few bites - 25% at the most. Very frequent meal and supplement refusals.     Per RN note today 5/16, had yogurt with syrup this morning, had sweet potatoes, yogurt, and a few bites of chicken for lunch.     NEW FINDINGS   Pt intake has been very poor during stay but did improve a bit yesterday and today. Pt was confused, combative, and aggressive several days ago, refusing cares, meds, and meals. Is now calm and corporative, oriented.      No GI concerns currently. Bowel sounds are normoactive in all quadrants. Pt is passing flatus. Last BM was yesterday 5/15 per flow sheet - last evening, small, soft/brown. Reported constipation yesterday 5/15 - took miralax.     Weights during admission:  Date/Time Weight Weight Method "   05/16/24 0537 105.6 kg (232 lb 11.2 oz) Bed scale   05/14/24 0600 108.5 kg (239 lb 4.8 oz) Bed scale   05/13/24 1014 105.3 kg (232 lb 1.6 oz) Bed scale   05/11/24 0500 101.4 kg (223 lb 9.6 oz) Bed scale   05/10/24 0600 101.9 kg (224 lb 9.6 oz) Bed scale   05/09/24 0500 99.8 kg (220 lb 1.6 oz) Bed scale   05/08/24 2246 99.8 kg (220 lb) Bed scale   Pt does have edema present, unsure of bed scale accuracy. Pt changed rooms so will see if new bed scale weight is different.     Likes: yogurt and 2% milk with each meal  Dislikes: apple juice    Meds - tylenol, asprin, lipitor, bumex (autoheld), zyrtec (autoheld), digoxin injection, lovenox injection, flonase, gabapentin, toprol (autoheld), lopressor, multivitamin with minerals, potassium chloride (autoheld), prednisone, entresto (autoheld), aldactone (autoheld), PRN IV haldol given 5/12, PRN lopressor injection given 5/13, PRN miralax given yesterday 5/15, PRN seroquel given early this AM 5/16, PRN zanaflex last given 5/14    *pt wasn't taking multivitamin with minerals ordered due to refusing meds, did take it yesterday 5/15    Will meet with pt as able for additional nutrition hx - brief obs of physical status. Edema may impact full assessment of NFPE (arm, legs).    MALNUTRITION  % Intake: </= 50% for >/= 5 days (severe)  % Weight Loss: Unable to assess due to fluctuations, lack of adequate data, weight gain via bed scale error vs. edema?  Subcutaneous Fat Loss: Facial region: potential mild-moderate *need in-depth NFPE, pt has edema to arm and legs  Muscle Loss: Facial & jaw region: potential mild to moderate *need in-depth NFPE, pt has edema to arm and legs  Fluid Accumulation/Edema: Does not meet criteria - 1+ trace dependent, right arm, legs, and feet. 2+ mild to BL ankles  Malnutrition Diagnosis: Unable to determine due to lack of adequate NFPE - will meet with pt as able    Previous Goals   Patient to consume % of nutritionally adequate meal trays TID, or  the equivalent with supplements/snacks  Evaluation: ongoing, progressing very slowly now - 25% of meals yesterday, tolerating a bit more of meals today than has been    Pt weight will remain stable during admission  Evaluation: ongoing, difficult to assess - frequent fluctuations via bed scale, weight is up overall but unsure if error vs. fluid retention    Pt will tolerate nutritional supplement    Evaluation: Not met - pt refused Ensure because does not like the taste, refused other flavors, was discontinued 5/13, trialed Magic cup but pt refused that meal, will try again if pt willing    Previous Nutrition Diagnosis  Inadequate oral intake related to poor appetite, acute on chronic illness as evidenced by estimated intake of </= 50% for >/= 5 days (severe), report of recent meal refusals, and increased needs above baseline  Evaluation: Improving very slowly now, updated/revised    CURRENT NUTRITION DIAGNOSIS  Inadequate oral intake related to poor appetite, acute on chronic illness as evidenced by estimated intake of </= 50% for >/= 5 days (severe), recent meal refusals, and increased needs above baseline    INTERVENTIONS  Implementation  Collaboration with other providers  Medical food supplement therapy - does not like Ensure, will try Magic cup BID with lunch and dinner  Multivitamin/mineral supplement therapy - please continue as ordered    Goals  Patient to consume % of nutritionally adequate meal trays TID, or the equivalent with supplements/snacks  Pt weight will remain stable during admission  Pt will tolerate nutritional supplement     Monitoring/Evaluation  Progress toward goals will be monitored and evaluated per protocol.    Delaney Hernandez RD, LD  Clinical Dietitian  Office: [phone]  Weekend pager: [phone]

## 2024-05-16 NOTE — PROGRESS NOTES
S- Transfer to 248 from 217.    B- a-fib    A- Brief systems assessment: see previous not from today from this writer timed 1:31pm    R- Transfer to 249 med/surg per physician orders. Continue to monitor pt and update physician as needed.      Code status: Full Code  Skin: intact, abdomen folds reddened, moisture barrier applied.  Fall Risk: Yes- Department fall risk interventions implemented.  Isolation and Signage: None  Medication drips upon transfer: none  Blue Bin checked and medications transfer out with patient: yes

## 2024-05-16 NOTE — PROGRESS NOTES
Care Management Follow Up    Length of Stay (days): 7    Expected Discharge Date: 2-4 days per physician note      Concerns to be Addressed: discharge planning       Patient plan of care discussed at interdisciplinary rounds: Yes    Anticipated Discharge Disposition: Long Term Care  [facility]    Anticipated Discharge Services: [facility] Worker    Anticipated Discharge DME: None    Patient/family educated on Medicare website which has current facility and service quality ratings: no    Education Provided on the Discharge Plan: Yes    Patient/Family in Agreement with the Plan: yes    Referrals Placed by CM/SW: External Care Coordination    Private pay costs discussed: Not applicable    Additional Information:  Writer spoke with Mary in admissions at [facility] (Admissions: [phone] Main Phone: [phone] Fax: [phone]). Discussed that plan is for patient to move out of the ICU today, but patient not ready for hospital discharge yet.  Anticipate another few days or so in the hospital.  Patient continues to have a bed hold at [facility]. CM to notify [facility] admissions when patient is closer to being ready for discharge.      DEVIN Winter  [facility]   [phone]

## 2024-05-16 NOTE — PLAN OF CARE
Goal Outcome Evaluation:      Plan of Care Reviewed With: patient    Overall Patient Progress: improvingOverall Patient Progress: improving         Major shift events: Pt has remained off amiodarone drip. Pt is receiving digoxin IV per order. HR has been afib but with a controlled rate this shift.  Neuro: Pt is A&Ox4. Strength equal and strong bilat.  CMS: Pt has good movement and no discoloration noted. Pt reports numbness/burning in feet at times. This is improved with blankets taken off feet.  Pulmonary: Lung sounds are clear throughout.  CV: Heart sounds are irregular irregular. Tele has been afib with a controlled rate this shift.  GI: Pt has active bowel sounds, passing flatus and had a small bowel movement this shift. Pt ate 25% of her dinner tonight needing only set up assistance.  : Miramontes catheter remains in place with good urine output. Urine is clear yellow without a strong odor.  Skin: scattered bruising. Slight redness to coccyx. Pt is being turned and repositioned q2 hrs and PRN side to side.  Pain: Pt is taking scheduled tylenol and Neurontin for neuropathy in feet.  Activity: Pt is up with elgin steady 2A and GB. Pt was up to BSC. Pt reports using wheelchair at baseline.  Hygiene: CHG wipes were done along with a partial bath. Miramontes catheter wipes were done this shift.  Lines/Tubes/Drains: Double lumen PICC and PIV in place.  Drips: Pt currently is receiving IV magnesium replacement and will have IV phosphorus replacement once done.    Plan: Continue to monitor pt, HR and administer digoxin per order.

## 2024-05-17 PROBLEM — G93.41 METABOLIC ENCEPHALOPATHY: Status: ACTIVE | Noted: 2024-05-16

## 2024-05-17 PROBLEM — E46 MALNUTRITION (H): Status: ACTIVE | Noted: 2024-05-17

## 2024-05-17 LAB
ANION GAP SERPL CALCULATED.3IONS-SCNC: 10 MMOL/L (ref 7–15)
BASE EXCESS BLDV CALC-SCNC: -2.6 MMOL/L (ref -3–3)
BUN SERPL-MCNC: 22.2 MG/DL (ref 8–23)
CALCIUM SERPL-MCNC: 8.5 MG/DL (ref 8.8–10.2)
CHLORIDE SERPL-SCNC: 112 MMOL/L (ref 98–107)
CREAT SERPL-MCNC: 0.9 MG/DL (ref 0.51–0.95)
DEPRECATED HCO3 PLAS-SCNC: 20 MMOL/L (ref 22–29)
DIGOXIN SERPL-MCNC: 1.4 NG/ML (ref 0.6–2)
EGFRCR SERPLBLD CKD-EPI 2021: 66 ML/MIN/1.73M2
GLUCOSE SERPL-MCNC: 94 MG/DL (ref 70–99)
HCO3 BLDV-SCNC: 23 MMOL/L (ref 21–28)
MAGNESIUM SERPL-MCNC: 1.7 MG/DL (ref 1.7–2.3)
O2/TOTAL GAS SETTING VFR VENT: 21 %
OXYHGB MFR BLDV: 66 % (ref 70–75)
PCO2 BLDV: 41 MM HG (ref 40–50)
PH BLDV: 7.35 [PH] (ref 7.32–7.43)
PHOSPHATE SERPL-MCNC: 3.2 MG/DL (ref 2.5–4.5)
PO2 BLDV: 36 MM HG (ref 25–47)
POTASSIUM SERPL-SCNC: 4.3 MMOL/L (ref 3.4–5.3)
SAO2 % BLDV: 66.9 % (ref 70–75)
SODIUM SERPL-SCNC: 142 MMOL/L (ref 135–145)

## 2024-05-17 PROCEDURE — 250N000013 HC RX MED GY IP 250 OP 250 PS 637: Performed by: PEDIATRICS

## 2024-05-17 PROCEDURE — 80048 BASIC METABOLIC PNL TOTAL CA: CPT | Performed by: PEDIATRICS

## 2024-05-17 PROCEDURE — 83735 ASSAY OF MAGNESIUM: CPT | Performed by: PEDIATRICS

## 2024-05-17 PROCEDURE — 84100 ASSAY OF PHOSPHORUS: CPT | Performed by: PEDIATRICS

## 2024-05-17 PROCEDURE — 80162 ASSAY OF DIGOXIN TOTAL: CPT | Performed by: PEDIATRICS

## 2024-05-17 PROCEDURE — 250N000011 HC RX IP 250 OP 636: Performed by: PEDIATRICS

## 2024-05-17 PROCEDURE — 250N000012 HC RX MED GY IP 250 OP 636 PS 637: Performed by: PEDIATRICS

## 2024-05-17 PROCEDURE — 120N000001 HC R&B MED SURG/OB

## 2024-05-17 PROCEDURE — 99232 SBSQ HOSP IP/OBS MODERATE 35: CPT | Performed by: PEDIATRICS

## 2024-05-17 PROCEDURE — 82805 BLOOD GASES W/O2 SATURATION: CPT | Performed by: PEDIATRICS

## 2024-05-17 RX ORDER — BUMETANIDE 1 MG/1
1 TABLET ORAL DAILY
DISCHARGE
Start: 2024-05-17 | End: 2024-06-04

## 2024-05-17 RX ORDER — METOPROLOL TARTRATE 25 MG/1
25 TABLET, FILM COATED ORAL 2 TIMES DAILY
DISCHARGE
Start: 2024-05-17

## 2024-05-17 RX ORDER — GABAPENTIN 100 MG/1
200 CAPSULE ORAL 3 TIMES DAILY
Status: DISCONTINUED | OUTPATIENT
Start: 2024-05-17 | End: 2024-05-18 | Stop reason: HOSPADM

## 2024-05-17 RX ORDER — MAGNESIUM OXIDE 400 MG/1
400 TABLET ORAL EVERY 4 HOURS
Status: COMPLETED | OUTPATIENT
Start: 2024-05-17 | End: 2024-05-17

## 2024-05-17 RX ORDER — ENOXAPARIN SODIUM 100 MG/ML
40 INJECTION SUBCUTANEOUS EVERY 12 HOURS
Status: DISCONTINUED | OUTPATIENT
Start: 2024-05-17 | End: 2024-05-18 | Stop reason: HOSPADM

## 2024-05-17 RX ORDER — GABAPENTIN 100 MG/1
200 CAPSULE ORAL 3 TIMES DAILY
DISCHARGE
Start: 2024-05-17

## 2024-05-17 RX ORDER — SPIRONOLACTONE 25 MG/1
25 TABLET ORAL DAILY
Status: DISCONTINUED | OUTPATIENT
Start: 2024-05-17 | End: 2024-05-18 | Stop reason: HOSPADM

## 2024-05-17 RX ORDER — ACETAMINOPHEN 500 MG
1000 TABLET ORAL 3 TIMES DAILY
DISCHARGE
Start: 2024-05-17

## 2024-05-17 RX ORDER — MULTIPLE VITAMINS W/ MINERALS TAB 9MG-400MCG
1 TAB ORAL DAILY
DISCHARGE
Start: 2024-05-18

## 2024-05-17 RX ADMIN — SPIRONOLACTONE 25 MG: 25 TABLET ORAL at 11:05

## 2024-05-17 RX ADMIN — ENOXAPARIN SODIUM 40 MG: 40 INJECTION SUBCUTANEOUS at 11:05

## 2024-05-17 RX ADMIN — GABAPENTIN 200 MG: 100 CAPSULE ORAL at 13:28

## 2024-05-17 RX ADMIN — PREDNISONE 5 MG: 5 TABLET ORAL at 08:21

## 2024-05-17 RX ADMIN — BUMETANIDE 1 MG: 0.5 TABLET ORAL at 08:24

## 2024-05-17 RX ADMIN — Medication 400 MG: at 11:05

## 2024-05-17 RX ADMIN — GABAPENTIN 200 MG: 100 CAPSULE ORAL at 21:09

## 2024-05-17 RX ADMIN — GABAPENTIN 100 MG: 100 CAPSULE ORAL at 08:24

## 2024-05-17 RX ADMIN — ACETAMINOPHEN 1000 MG: 500 TABLET ORAL at 08:19

## 2024-05-17 RX ADMIN — ACETAMINOPHEN 1000 MG: 500 TABLET ORAL at 16:53

## 2024-05-17 RX ADMIN — ATORVASTATIN CALCIUM 40 MG: 40 TABLET, FILM COATED ORAL at 08:22

## 2024-05-17 RX ADMIN — ENOXAPARIN SODIUM 40 MG: 40 INJECTION SUBCUTANEOUS at 21:09

## 2024-05-17 RX ADMIN — METOPROLOL TARTRATE 25 MG: 25 TABLET, FILM COATED ORAL at 08:22

## 2024-05-17 RX ADMIN — Medication 400 MG: at 08:22

## 2024-05-17 RX ADMIN — ACETAMINOPHEN 1000 MG: 500 TABLET ORAL at 21:09

## 2024-05-17 RX ADMIN — FLUTICASONE PROPIONATE 1 SPRAY: 50 SPRAY, METERED NASAL at 08:25

## 2024-05-17 RX ADMIN — ASPIRIN 81 MG: 81 TABLET, COATED ORAL at 08:21

## 2024-05-17 RX ADMIN — Medication 1 TABLET: at 08:24

## 2024-05-17 RX ADMIN — METOPROLOL TARTRATE 25 MG: 25 TABLET, FILM COATED ORAL at 21:09

## 2024-05-17 ASSESSMENT — ACTIVITIES OF DAILY LIVING (ADL)
ADLS_ACUITY_SCORE: 57
ADLS_ACUITY_SCORE: 63
ADLS_ACUITY_SCORE: 57
ADLS_ACUITY_SCORE: 53
ADLS_ACUITY_SCORE: 63
ADLS_ACUITY_SCORE: 63
ADLS_ACUITY_SCORE: 53
ADLS_ACUITY_SCORE: 57
ADLS_ACUITY_SCORE: 57
ADLS_ACUITY_SCORE: 63
ADLS_ACUITY_SCORE: 57
ADLS_ACUITY_SCORE: 53
ADLS_ACUITY_SCORE: 57
ADLS_ACUITY_SCORE: 57
ADLS_ACUITY_SCORE: 63
ADLS_ACUITY_SCORE: 57
ADLS_ACUITY_SCORE: 57

## 2024-05-17 NOTE — PLAN OF CARE
"Goal Outcome Evaluation:      Plan of Care Reviewed With: patient    Overall Patient Progress: no changeOverall Patient Progress: no change    Outcome Evaluation: [id number]: Patient refused  to be up on chair at dinner time. Denies pain and no reports of SOB at rest. Mepilex foam dressing on for both heels for protection. Sacral foam dressing applied. Had continent and incontinent urine output.      /59 (BP Location: Right arm)   Pulse 77   Temp 97.4  F (36.3  C) (Oral)   Resp 18   Ht 1.499 m (4' 11\")   Wt 105.6 kg (232 lb 11.2 oz)   SpO2 95%   BMI 47.00 kg/m        "

## 2024-05-17 NOTE — PLAN OF CARE
Goal Outcome Evaluation:      Plan of Care Reviewed With: patient    Overall Patient Progress: improvingOverall Patient Progress: improving    Outcome Evaluation: Pt is A&Ox4. Pt has been pleasant, calm and cooperative this shift. VSS on RA. Pt is up with 2A, gb and elgin steady. Pt was up to chair for meals. Pt's heart sounds are irregular and tele is Afib. Rate has been controlled this shift in the upper 50's to 70's. Pt reports bilateral foot pain from neuropathy. Neurotin given per order. Neurontin dose increased today. Pt is also taking scheduled tylenol for pain managment. Pt's morse removed at 1115 today and pt has had one incontinent void. Pt is on mag and phos protocol. Magnesium replaced and both to be rechecked in the AM.

## 2024-05-17 NOTE — PLAN OF CARE
"  Problem: Adult Inpatient Plan of Care  Goal: Plan of Care Review  Description: The Plan of Care Review/Shift note should be completed every shift.  The Outcome Evaluation is a brief statement about your assessment that the patient is improving, declining, or no change.  This information will be displayed automatically on your shift  note.  Outcome: Progressing  Goal: Patient-Specific Goal (Individualized)  Description: You can add care plan individualizations to a care plan. Examples of Individualization might be:  \"Parent requests to be called daily at 9am for status\", \"I have a hard time hearing out of my right ear\", or \"Do not touch me to wake me up as it startles  me\".  Outcome: Progressing  Goal: Absence of Hospital-Acquired Illness or Injury  Outcome: Progressing  Intervention: Identify and Manage Fall Risk  Recent Flowsheet Documentation  Taken 5/17/2024 0100 by Chris Horton RN  Safety Promotion/Fall Prevention:   activity supervised   increase visualization of patient   nonskid shoes/slippers when out of bed   room door open   room near nurse's station   safety round/check completed   supervised activity  Taken 5/16/2024 2000 by Chris Horton RN  Safety Promotion/Fall Prevention:   activity supervised   increase visualization of patient   nonskid shoes/slippers when out of bed   room door open   room near nurse's station   safety round/check completed   supervised activity  Intervention: Prevent Skin Injury  Recent Flowsheet Documentation  Taken 5/17/2024 0100 by Chris Horton RN  Device Skin Pressure Protection: absorbent pad utilized/changed  Taken 5/16/2024 2000 by Chris Horton RN  Body Position: position changed independently  Device Skin Pressure Protection: absorbent pad utilized/changed  Intervention: Prevent and Manage VTE (Venous Thromboembolism) Risk  Recent Flowsheet Documentation  Taken 5/17/2024 0100 by Chris Horton RN  VTE Prevention/Management: patient refused " intervention  Taken 5/16/2024 2000 by Chris Horton RN  VTE Prevention/Management: patient refused intervention  Intervention: Prevent Infection  Recent Flowsheet Documentation  Taken 5/17/2024 0100 by Chris Horton RN  Infection Prevention:   rest/sleep promoted   single patient room provided  Taken 5/16/2024 2000 by Chris Horton RN  Infection Prevention:   rest/sleep promoted   single patient room provided  Goal: Optimal Comfort and Wellbeing  Outcome: Progressing  Intervention: Provide Person-Centered Care  Recent Flowsheet Documentation  Taken 5/17/2024 0100 by Chris Horton RN  Trust Relationship/Rapport: care explained  Taken 5/16/2024 2000 by Chris Horton RN  Trust Relationship/Rapport: care explained  Goal: Readiness for Transition of Care  Outcome: Progressing     Problem: Risk for Delirium  Goal: Optimal Coping  Outcome: Progressing  Intervention: Optimize Psychosocial Adjustment to Delirium  Recent Flowsheet Documentation  Taken 5/17/2024 0100 by Chris Horton RN  Supportive Measures: active listening utilized  Taken 5/16/2024 2000 by Chris Horton RN  Supportive Measures: active listening utilized  Goal: Improved Behavioral Control  Outcome: Progressing  Intervention: Minimize Safety Risk  Recent Flowsheet Documentation  Taken 5/17/2024 0100 by Chris Horton RN  Communication Enhancement Strategies: call light answered in person  Enhanced Safety Measures: pain management  Trust Relationship/Rapport: care explained  Taken 5/16/2024 2000 by Chris Horton RN  Communication Enhancement Strategies: call light answered in person  Enhanced Safety Measures: pain management  Trust Relationship/Rapport: care explained  Goal: Improved Attention and Thought Clarity  Outcome: Progressing  Intervention: Maximize Cognitive Function  Recent Flowsheet Documentation  Taken 5/17/2024 0100 by Chris Horton RN  Sensory Stimulation Regulation: television on  Reorientation Measures: clock in view  Taken  5/16/2024 2000 by hCris Horton RN  Sensory Stimulation Regulation: television on  Reorientation Measures: clock in view  Goal: Improved Sleep  Outcome: Progressing     Problem: Fall Injury Risk  Goal: Absence of Fall and Fall-Related Injury  Outcome: Progressing  Intervention: Identify and Manage Contributors  Recent Flowsheet Documentation  Taken 5/17/2024 0100 by Chris Horton RN  Medication Review/Management: medications reviewed  Taken 5/16/2024 2000 by Chris Horton RN  Medication Review/Management: medications reviewed  Intervention: Promote Injury-Free Environment  Recent Flowsheet Documentation  Taken 5/17/2024 0100 by Chrsi Horton RN  Safety Promotion/Fall Prevention:   activity supervised   increase visualization of patient   nonskid shoes/slippers when out of bed   room door open   room near nurse's station   safety round/check completed   supervised activity  Taken 5/16/2024 2000 by Chris Horton RN  Safety Promotion/Fall Prevention:   activity supervised   increase visualization of patient   nonskid shoes/slippers when out of bed   room door open   room near nurse's station   safety round/check completed   supervised activity     Problem: Pain Acute  Goal: Optimal Pain Control and Function  Outcome: Progressing  Intervention: Prevent or Manage Pain  Recent Flowsheet Documentation  Taken 5/17/2024 0100 by Chris Horton RN  Sensory Stimulation Regulation: television on  Bowel Elimination Promotion: adequate fluid intake promoted  Medication Review/Management: medications reviewed  Taken 5/16/2024 2000 by Chris Horton RN  Sensory Stimulation Regulation: television on  Bowel Elimination Promotion: adequate fluid intake promoted  Medication Review/Management: medications reviewed  Intervention: Optimize Psychosocial Wellbeing  Recent Flowsheet Documentation  Taken 5/17/2024 0100 by Chris Horton RN  Supportive Measures: active listening utilized  Taken 5/16/2024 2000 by Chris Horton  RN  Supportive Measures: active listening utilized     Problem: Gas Exchange Impaired  Goal: Optimal Gas Exchange  Outcome: Progressing     Problem: Infection  Goal: Absence of Infection Signs and Symptoms  Outcome: Progressing     Problem: Oral Intake Inadequate  Goal: Improved Oral Intake  Outcome: Progressing       Pt remains alert and oriented, she has been pleasant and cooperative, and uses the call light as advised.   Telemetry has shown Afib with a rate from 60-80 with occasional PVC's.  Lungs are clear.   Bowel sounds active.   Vitals are stable.   Miramontes in place with adequate output.   Will continue to monitor telemetry and follow plan of care.

## 2024-05-17 NOTE — PROGRESS NOTES
Care Management Follow Up    Length of Stay (days): 8    Expected Discharge Date:  05/18/24     Concerns to be Addressed: discharge planning       Patient plan of care discussed at interdisciplinary rounds: Yes    Anticipated Discharge Disposition: Long Term Care    Disposition Comments: Return to [facility]  Anticipated Discharge Services: County Worker  Anticipated Discharge DME: None    Patient/family educated on Medicare website which has current facility and service quality ratings: no  Education Provided on the Discharge Plan: Yes  Patient/Family in Agreement with the Plan: yes    Referrals Placed by CM/SW: External Care Coordination  Private pay costs discussed: Not applicable    Additional Information:  Update received during IDT rounds. Pt is not medically stable for discharge today.     Anticipated to be ready to return back to [facility] facility tomorrow--5/18    Call placed to daughter Morelia # [phone]  Discussed plan for discharge tomorrow.     Reviewed transport plans. Morelia reported she had delivered the Pt's wheelchair to the hospital to assist with transport.     Call placed to Admissions at [facility]: [phone]  Provided clinical update. Addressed questions/concerns.     Noted Pt would not be returning back with PICC line or seroquel medication per MD.       Anticipated Discharge Plan: Return to [facility]  Phone: [phone]   Fax: [phone])     Transport: [employer]  Company: [employer] # [phone] at 11:00am  Pt has her own wheelchair in room       Daughter Morelia requested to receive a copy of the Pt's discharge orders.  --[facility]: please make extra copy for liliane att: Morelia, please place in Pt's transfer envelope to [facility] at time of discharge.       DEVIN Recinos  [facility] [phone]   [facility]  [phone]

## 2024-05-17 NOTE — PROGRESS NOTES
[facility]    Medicine Progress Note - Hospitalist Service    Date of Admission:  5/8/2024    Assessment & Plan   Jayashree Carson is a 73 year old female with chronic atrial fibrillation status post Watchman procedure, previous CVA with residual chronic left-sided hemiparesis, chronic heart failure with preserved LVEF and moderate chronic right heart failure, pulmonary hypertension, COPD, hypertension, and morbid obesity who resides at long-term care facility and was transferred to the ER due to increased confusion and generalized fatigue and was admitted to ICU due to concerns for atrial fibrillation with RVR, hypotension, acute renal failure with hyperkalemia, and acute encephalopathy.       Chronic atrial fibrillation with RVR with severe hypotension, SIRS, and increased anion gap metabolic acidosis  Presence of Watchman left atrial appendage closure device  Known chronic atrial fibrillation and presented with RVR heart rate 150s and severe hypotension with SIRS.  She also presented with increased anion gap metabolic acidosis probably due at least partly to severe hypotension although lactic acid was normal.  Atrial fibrillation is chronically treated with metoprolol for rate control.  Severe hypotension with SIRS was attributed to the combination of rapid atrial fibrillation, hypovolemia (chronically takes diuretics), and antihypertensive effect of other previous chronic medication including Entresto as well as metoprolol and diuretics.  Initial treatment for rapid atrial fibrillation included digoxin 125 mcg IV x1 without change followed by amiodarone bolus and continuous infusion.  Initial treatment for severe hypotension included aggressive IV fluid resuscitation and withholding chronic diuretics and antihypertensive medications.  Subsequent treatment for atrial fibrillation was complicated by behavioral disturbance from encephalopathy at which time she was repeatedly refusing  doses of oral medications.   Heart rate improved and has remained well-controlled after stopping amiodarone and administering IV digoxin load.  Serum digoxin level is now within the therapeutic range. As she has improved, she is now reliably taking oral medications.   -anticipate starting scheduled dosing of oral digoxin tomorrow  -Continue metoprolol tartrate at lower dose 25 mg twice daily, not planning to resume Toprol-XL  -Continue cardiac monitoring while actively adjusting medications for rate control of atrial fibrillation    Acute encephalopathy with paranoia and behavioral disturbance  Old stroke with encephalomalacia, left hemiparesis, and left neuropathic pain  Aspirin induced platelet function defect  Presented with confusion and fluctuating orientation.  During hospitalization, has had intermittent behavioral fluctuating between hypoactivity to hyperactivity as well as episodes of paranoia.  She did not tolerate Precedex infusion due to hypotension.  Behavioral disturbance and paranoia were associated with refusal to take oral medications including her chronic medications.  She has not had overt new focal neurologic deficit or seizure activity and head CT was negative for acute abnormalities but incidental note of multiple areas of encephalomalacia.  Clinical course has not been concerning for acute psychosis.  After investigation, an acute encephalopathy primarily due to cerebral hypoperfusion from rapid atrial fibrillation with hypotension superimposed upon chronic encephalomalacia after old stroke is suspected.  However, severe uremia may have also contributed to encephalopathy.  As her hemodynamic status has improved, signs of encephalopathy are subsiding and her behavior has become cooperative.  She has known chronic left hemiparesis after old stroke along with left-sided neuropathic pain.  Low-dose gabapentin was restarted during this hospitalization for treatment of her neuropathic pain.  She  continues to have foot pain currently bilaterally likely due at least in part to peripheral edema after initial fluid resuscitation.  Chronically taking aspirin after previous stroke which causes platelet function defect.  Active bleeding has not been suspected during hospitalization.  -Continue supportive measures  -Continue low-dose gabapentin but increase from 100 mg to 200 mg 3 times daily  -Continue chronic dose of aspirin  -May start to advance activity as tolerated    Acute renal failure superimposed on stage 3a chronic kidney disease with hyperkalemia and metabolic acidosis  Presented with creatinine 3.12, increased compared with baseline creatinine of approximately 1.1-1.7 (stage IIIa CKD) concerning for acute renal failure.  Acute renal failure was associated with moderate hyperkalemia potassium 6.1 and severe uremia with  as well as metabolic acidosis.  After holding diuretics and Entresto, treatment with Kayexalate and IV fluids, restoration of normotension, hyperkalemia and severe uremia resolved and renal function recovered to baseline and metabolic acidosis has now also resolved.  -Restart chronic dose of spironolactone   -Not anticipating additional IV fluid treatment at this time  -Continue strict I/O monitoring  -Ordered recheck of renal function and blood gas    Hypomagnesemia  Hypoalbuminemia  Serum magnesium low at 1.6 when initially tested on May 13, improved after supplementation.  Presented with normal serum albumin that subsequently became low during hospitalization likely due to inadequate oral nutritional intake.  -Monitor magnesium and provide supplementation as indicated per protocol  -Monitor albumin as indicated    Klebsiella bacteriuria  Presented with abnormal UA including pyuria without known recent symptoms suspicious for UTI.  Initial urinary hesitancy and oliguria may have been due to acute renal failure rather than infection.  Although she presented with SIRS criteria  including leukocytosis, procalcitonin was low, lactic acid was normal, and leukocytosis rapidly resolved, so sepsis has not been suspected.  Two species of Klebsiella grew on UC and she completed a 5 day empiric antibiotic treatment course during hospitalization.  -no further antibiotic treatment anticipated for this problem    Chronic heart failure with preserved LVEF and chronic right heart failure  Pulmonary hypertension  Previous history of chronic heart failure with normal LV EF but dilated RV with decreased RV systolic function noted on echo during this hospitalization confirming chronic right heart failure.  Echocardiogram also demonstrated findings suspicious for pulmonary hypertension.  Chronic heart failure had previously been treated with Bumex, spironolactone, and Entresto, all of which were held due to persistent hypotension and acute renal failure with hyperkalemia.  After treatment with aggressive fluid resuscitation, she developed volume overload that has been treated with doses of IV Bumex, but her chronic oral diuretic therapy has not yet been restarted.  Weight during hospitalization is significantly increased compared with recent weights of 213 to 218 pounds in early May from the nursing home, but as of 5/16 she appears euvolemic.  -Continue oral Bumex but at lower dose of 1 mg once daily  -Continue to monitor daily weights, intake, and output  -restarting spironolactone today  -Discontinue previous chronic Entresto because rate control of atrial fibrillation appears to be the clinical priority, echo demonstrated normal LV EF, and blood pressure is normotensive    Essential hypertension  Chronic hypertension previously treated with Bumex, metoprolol XL, Entresto and spironolactone.  Presented with severe hypotension and these chronic medications were initially held.  Hypotension has resolved and blood pressure is gradually starting to trend upward.   -Continue lower dose metoprolol as  "above  -Continue lower dose Bumex as above  -Resume chronic dose of spironolactone  -Stop Entresto as above    COPD  Previous history of COPD treated chronically with prednisone 5 mg daily and uses albuterol prn for breakthrough symptoms.  She does not appear to have been treated with stress doses of corticosteroids during this hospitalization.  -continue prednisone 5 mg daily     Morbid obesity  Appears obese with BMI 47 concerning for morbid obesity.  -No acute intervention          Diet: 2 Gram Sodium Diet  Snacks/Supplements Adult: Magic Cup; With Meals    DVT Prophylaxis: Discussed her current high risk for VTE and its potential consequences and recommended treatment options to prevent VTE during hospitalization including prophylactic anticoagulation versus use of SCDs, patient expressed preference to resume enoxaparin (Lovenox) SQ which is ordered twice daily because of her morbid obesity  Miramontes Catheter: PRESENT, indication: Acute retention or obstruction;Non-ICU: q2 hour intake/output with documentation  Lines: PRESENT      PICC 05/14/24 Double Lumen Left Brachial vein medial-Site Assessment: WDL      Cardiac Monitoring: ACTIVE order. Indication: Tachyarrhythmias, acute (48 hours)  Code Status: Full Code      Clinically Significant Risk Factors            # Hypomagnesemia: Lowest Mg = 1.6 mg/dL in last 2 days, will replace as needed   # Hypoalbuminemia: Lowest albumin = 3 g/dL at 5/15/2024  6:22 AM, will monitor as appropriate     # Hypertension: Noted on problem list  # Heart failure with preserved ejection fraction: EF >50% and home medication list includes sacubitril/valsartan (Entresto)       # Severe Obesity: Estimated body mass index is 47 kg/m  as calculated from the following:    Height as of this encounter: 1.499 m (4' 11\").    Weight as of this encounter: 105.6 kg (232 lb 11.2 oz).      # Financial/Environmental Concerns: none         Disposition Plan     Medically Ready for Discharge: Anticipated " Tomorrow         Saturnino Dominguez MD  Hospitalist Service  [facility]  Securely message with [facility] [employer] (more info)  Text page via [facility] Paging/Directory   ______________________________________________________________________    Interval History   There were no significant overnight events.  Patient has pain in her feet which is worse with weightbearing for example when she stands on the Mary steady.  She denies any shortness of breath at rest.  She denies any dizziness.  She is tolerating oral intake.  She has had good urine output but continues to have indwelling urinary catheter.  She is apprehensive about increased urinary frequency after restarting diuretics and removing urinary catheter.    Physical Exam   Vital Signs: Temp: 98.3  F (36.8  C) Temp src: Oral BP: 131/80 Pulse: 59   Resp: 18 SpO2: 93 % O2 Device: None (Room air)    Patient Vitals for the past 24 hrs:   BP Temp Temp src Pulse Resp SpO2   05/17/24 0802 131/80 98.3  F (36.8  C) Oral 59 18 93 %   05/17/24 0225 117/69 98.1  F (36.7  C) Oral 85 18 95 %   05/16/24 2327 125/69 97.9  F (36.6  C) Oral 88 18 95 %   05/16/24 2045 110/60 97.5  F (36.4  C) Oral 96 20 98 %   05/16/24 1938 116/48 97.8  F (36.6  C) Oral 91 16 97 %   05/16/24 1523 128/72 97.7  F (36.5  C) Oral 68 20 95 %   05/16/24 1200 116/84 98.8  F (37.1  C) Oral 59 27 94 %   05/16/24 1100 (!) 130/94 -- -- 64 16 94 %     Weight: 232 lbs 11.2 oz  Vitals:    05/10/24 0600 05/11/24 0500 05/13/24 1014 05/14/24 0600   Weight: 101.9 kg (224 lb 9.6 oz) 101.4 kg (223 lb 9.6 oz) 105.3 kg (232 lb 1.6 oz) 108.5 kg (239 lb 4.8 oz)    05/16/24 0537   Weight: 105.6 kg (232 lb 11.2 oz)       Intake/Output Summary (Last 24 hours) at 5/17/2024 0931  Last data filed at 5/17/2024 0600  Gross per 24 hour   Intake 600 ml   Output 1940 ml   Net -1340 ml     General Appearance: Morbidly obese elderly woman without signs of acute distress sitting up in a chair  Respiratory: Normal  respiratory effort, diminished breath sounds throughout, clear lungs  Cardiovascular: Irregularly irregular rate and rhythm, good radial pulse, normal capillary refill, mild peripheral edema in the lower legs and feet  GI: Morbidly obese abdomen, soft  Skin: Pale color  Neuro: Alert and maintains wakefulness and attention    Medical Decision Making             Data     I have personally reviewed the following data over the past 24 hrs:    N/A  \   N/A   / N/A     142 112 (H) 22.2 /  94   4.3 20 (L) 0.90 \       Magnesium 1.7, phosphorus 3.2  Digoxin level 1.4 (0.6-2 is the therapeutic range)    Venous Blood Gas  Recent Labs   Lab 05/17/24  0532 05/14/24  0513 05/13/24  0816 05/12/24  0504   PHV 7.35 7.36 7.38 7.30*   PCO2V 41 28* 26* 39*   PO2V 36 67* 78* 23*   HCO3V 23 16* 16* 19*   LAZARO -2.6 -8.4* -7.6* -6.5*   O2PER 21 21 21 21     Cardiac monitoring results reviewed over the past 24 hrs: Atrial fibrillation with controlled ventricular response, intermittent brief episodes of bradycardia with drop in heart rate to mid 40s yesterday morning that resolved without specific intervention and have not recurred, no other arrhythmias    Recent Labs   Lab 05/17/24  0532 05/16/24  0518 05/15/24  0838 05/15/24  0622 05/14/24  0513 05/13/24  0816   WBC  --   --   --  9.1 8.6 12.1*   HGB  --   --   --  10.2* 11.2* 11.7   MCV  --   --   --  95 94 95   PLT  --   --   --  186  171 187 203    139  --  142 142 141   POTASSIUM 4.3 4.5  --  4.1 4.5 4.4   CHLORIDE 112* 112*  --  116* 117* 117*   CO2 20* 18*  --  16* 14* 15*   BUN 22.2  --   --  19.8 23.2* 27.0*   CR 0.90  --   --  1.14* 1.04* 1.07*   ANIONGAP 10 9  --  10 11 9   ALIDA 8.5*  --   --  8.4* 8.3* 8.8   GLC 94  --  94 101* 141* 107*   ALBUMIN  --   --   --  3.0*  --  3.2*   PROTTOTAL  --   --   --  5.4*  --  6.2*   BILITOTAL  --   --   --  0.7  --  0.5   ALKPHOS  --   --   --  106  --  116   ALT  --   --   --  24  --  34   AST  --   --   --  17 --  22

## 2024-05-18 VITALS
OXYGEN SATURATION: 95 % | WEIGHT: 229.5 LBS | SYSTOLIC BLOOD PRESSURE: 133 MMHG | TEMPERATURE: 97.5 F | HEART RATE: 68 BPM | DIASTOLIC BLOOD PRESSURE: 81 MMHG | RESPIRATION RATE: 18 BRPM | HEIGHT: 59 IN | BODY MASS INDEX: 46.27 KG/M2

## 2024-05-18 PROBLEM — E66.01 MORBID OBESITY (H): Status: ACTIVE | Noted: 2024-05-18

## 2024-05-18 LAB
ANION GAP SERPL CALCULATED.3IONS-SCNC: 9 MMOL/L (ref 7–15)
BASE EXCESS BLDV CALC-SCNC: 0.3 MMOL/L (ref -3–3)
BUN SERPL-MCNC: 24.1 MG/DL (ref 8–23)
CALCIUM SERPL-MCNC: 8.5 MG/DL (ref 8.8–10.2)
CHLORIDE SERPL-SCNC: 107 MMOL/L (ref 98–107)
CREAT SERPL-MCNC: 0.87 MG/DL (ref 0.51–0.95)
DEPRECATED HCO3 PLAS-SCNC: 23 MMOL/L (ref 22–29)
DIGOXIN SERPL-MCNC: 1.1 NG/ML (ref 0.6–2)
EGFRCR SERPLBLD CKD-EPI 2021: 69 ML/MIN/1.73M2
GLUCOSE SERPL-MCNC: 101 MG/DL (ref 70–99)
HCO3 BLDV-SCNC: 26 MMOL/L (ref 21–28)
HGB BLD-MCNC: 10 G/DL (ref 11.7–15.7)
MAGNESIUM SERPL-MCNC: 1.7 MG/DL (ref 1.7–2.3)
O2/TOTAL GAS SETTING VFR VENT: 21 %
OXYHGB MFR BLDV: 59 % (ref 70–75)
PCO2 BLDV: 46 MM HG (ref 40–50)
PH BLDV: 7.36 [PH] (ref 7.32–7.43)
PHOSPHATE SERPL-MCNC: 2.8 MG/DL (ref 2.5–4.5)
PLATELET # BLD AUTO: 193 10E3/UL (ref 150–450)
PO2 BLDV: 34 MM HG (ref 25–47)
POTASSIUM SERPL-SCNC: 4.4 MMOL/L (ref 3.4–5.3)
SAO2 % BLDV: 60 % (ref 70–75)
SODIUM SERPL-SCNC: 139 MMOL/L (ref 135–145)

## 2024-05-18 PROCEDURE — 82805 BLOOD GASES W/O2 SATURATION: CPT | Performed by: PEDIATRICS

## 2024-05-18 PROCEDURE — 250N000013 HC RX MED GY IP 250 OP 250 PS 637: Performed by: PEDIATRICS

## 2024-05-18 PROCEDURE — 99239 HOSP IP/OBS DSCHRG MGMT >30: CPT | Performed by: PEDIATRICS

## 2024-05-18 PROCEDURE — 999N000157 HC STATISTIC RCP TIME EA 10 MIN

## 2024-05-18 PROCEDURE — 999N000156 HC STATISTIC RCP CONSULT EA 30 MIN

## 2024-05-18 PROCEDURE — 85049 AUTOMATED PLATELET COUNT: CPT | Performed by: PEDIATRICS

## 2024-05-18 PROCEDURE — 80162 ASSAY OF DIGOXIN TOTAL: CPT | Performed by: PEDIATRICS

## 2024-05-18 PROCEDURE — 80048 BASIC METABOLIC PNL TOTAL CA: CPT | Performed by: PEDIATRICS

## 2024-05-18 PROCEDURE — 85018 HEMOGLOBIN: CPT | Performed by: PEDIATRICS

## 2024-05-18 PROCEDURE — 84100 ASSAY OF PHOSPHORUS: CPT | Performed by: PEDIATRICS

## 2024-05-18 PROCEDURE — 83735 ASSAY OF MAGNESIUM: CPT | Performed by: PEDIATRICS

## 2024-05-18 PROCEDURE — 250N000012 HC RX MED GY IP 250 OP 636 PS 637: Performed by: PEDIATRICS

## 2024-05-18 PROCEDURE — 94640 AIRWAY INHALATION TREATMENT: CPT

## 2024-05-18 PROCEDURE — 250N000011 HC RX IP 250 OP 636: Performed by: PEDIATRICS

## 2024-05-18 PROCEDURE — 250N000009 HC RX 250: Performed by: PEDIATRICS

## 2024-05-18 RX ORDER — TRAMADOL HYDROCHLORIDE 50 MG/1
50 TABLET ORAL EVERY 8 HOURS PRN
Qty: 20 TABLET | Refills: 0 | Status: SHIPPED | OUTPATIENT
Start: 2024-05-18

## 2024-05-18 RX ORDER — MAGNESIUM OXIDE 400 MG/1
400 TABLET ORAL EVERY 4 HOURS
Status: DISCONTINUED | OUTPATIENT
Start: 2024-05-18 | End: 2024-05-18 | Stop reason: HOSPADM

## 2024-05-18 RX ORDER — DIGOXIN 0.06 MG/1
62.5 TABLET ORAL DAILY
DISCHARGE
Start: 2024-05-18

## 2024-05-18 RX ORDER — ALBUTEROL SULFATE 90 UG/1
2 AEROSOL, METERED RESPIRATORY (INHALATION) EVERY 6 HOURS PRN
DISCHARGE
Start: 2024-05-18

## 2024-05-18 RX ADMIN — ENOXAPARIN SODIUM 40 MG: 40 INJECTION SUBCUTANEOUS at 09:00

## 2024-05-18 RX ADMIN — PREDNISONE 5 MG: 5 TABLET ORAL at 08:52

## 2024-05-18 RX ADMIN — ACETAMINOPHEN 1000 MG: 500 TABLET ORAL at 08:52

## 2024-05-18 RX ADMIN — BUMETANIDE 1 MG: 0.5 TABLET ORAL at 08:51

## 2024-05-18 RX ADMIN — Medication 400 MG: at 08:51

## 2024-05-18 RX ADMIN — FLUTICASONE PROPIONATE 1 SPRAY: 50 SPRAY, METERED NASAL at 08:52

## 2024-05-18 RX ADMIN — SPIRONOLACTONE 25 MG: 25 TABLET ORAL at 08:52

## 2024-05-18 RX ADMIN — DIGOXIN 62.5 MCG: 125 TABLET ORAL at 09:06

## 2024-05-18 RX ADMIN — GABAPENTIN 200 MG: 100 CAPSULE ORAL at 08:51

## 2024-05-18 RX ADMIN — ASPIRIN 81 MG: 81 TABLET, COATED ORAL at 08:51

## 2024-05-18 RX ADMIN — ATORVASTATIN CALCIUM 40 MG: 40 TABLET, FILM COATED ORAL at 08:52

## 2024-05-18 RX ADMIN — Medication 1 TABLET: at 08:52

## 2024-05-18 RX ADMIN — METOPROLOL TARTRATE 25 MG: 25 TABLET, FILM COATED ORAL at 09:05

## 2024-05-18 RX ADMIN — ALBUTEROL SULFATE 2.5 MG: 2.5 SOLUTION RESPIRATORY (INHALATION) at 09:10

## 2024-05-18 ASSESSMENT — ACTIVITIES OF DAILY LIVING (ADL)
ADLS_ACUITY_SCORE: 63
ADLS_ACUITY_SCORE: 61
ADLS_ACUITY_SCORE: 63
ADLS_ACUITY_SCORE: 61

## 2024-05-18 NOTE — PROGRESS NOTES
Care Management Discharge Note    Discharge Date: 05/18/2024       Discharge Disposition: Long Term Care    Discharge Services: County Worker    Discharge DME: None    Discharge Transportation: agency    Private pay costs discussed: Not applicable    Does the patient's insurance plan have a 3 day qualifying hospital stay waiver?  No    PAS Confirmation Code:    Patient/family educated on Medicare website which has current facility and service quality ratings: no    Education Provided on the Discharge Plan: Yes  Persons Notified of Discharge Plans: Morelia daughter  Patient/Family in Agreement with the Plan: yes    Handoff Referral Completed: Yes    Additional Information:  [facility] will return to [facility] to day at 1100 via [facility]. Copy of discharge orders in the envelope for daughter Morelia.    Message left for EDGAR James at [facility] about transfer back. [phone].    Bertha Howard RN   Inpatient Care Coordinator  [facility] [phone]  [facility] [phone]     Bertha Howard RN

## 2024-05-18 NOTE — PLAN OF CARE
"Goal Outcome Evaluation:      Plan of Care Reviewed With: patient    Overall Patient Progress: no changeOverall Patient Progress: no change    Outcome Evaluation: Alert and oriented x 4. Vitals stable on RA. Denies nausea and vomiting. No shortness of breath reported. Reports pain in both legs but more in the left. Scheduled tyelnol and gabapantin given with relief. Tele shows A-fib.    /70 (BP Location: Right arm)   Pulse 65   Temp 98.1  F (36.7  C) (Oral)   Resp 18   Ht 1.499 m (4' 11\")   Wt 105.6 kg (232 lb 11.2 oz)   SpO2 97%   BMI 47.00 kg/m      "

## 2024-05-18 NOTE — DISCHARGE SUMMARY
"[facility]  Hospitalist Discharge Summary      Date of Admission:  5/8/2024  Date of Discharge:  5/18/2024  Discharging Provider: Saturnino Dominguez MD  Discharge Service: Hospitalist Service    Discharge Diagnoses   Principal Problem:    Atrial fibrillation with RVR (H)  Active Problems:    SIRS (systemic inflammatory response syndrome) (H)    Arterial hypotension    Hyperkalemia    Acute renal failure superimposed on stage 3a chronic kidney disease, unspecified acute renal failure type (H)    Increased anion gap metabolic acidosis    Asymptomatic bacteriuria    Pulmonary hypertension (H)    Chronic right heart failure (H)    Metabolic encephalopathy    Paranoia (H)    Malnutrition (H24)    Presence of Watchman left atrial appendage closure device    History of CVA (cerebrovascular accident) with residual left sided weakness    Essential hypertension    COPD (chronic obstructive pulmonary disease) (H)    Neuropathic pain    Morbid obesity (H)    Clinically Significant Risk Factors     # Severe Obesity: Estimated body mass index is 47 kg/m  as calculated from the following:    Height as of this encounter: 1.499 m (4' 11\").    Weight as of this encounter: 105.6 kg (232 lb 11.2 oz).       Follow-ups Needed After Discharge   Follow-up Appointments     Follow Up and recommended labs and tests      Follow up with [facility] physician.  The following labs/tests are   recommended: digoxin level and K within 1 week.            Discharge Disposition   Discharged to nursing home  Condition at discharge: Stable    Hospital Course   Jayashree Carson is a 73 year old female with chronic atrial fibrillation status post Watchman procedure, previous CVA with residual chronic left-sided hemiparesis, chronic heart failure with preserved LVEF and moderate chronic right heart failure, pulmonary hypertension, COPD, hypertension, and morbid obesity who resides at long-term care facility and was transferred " to the ER due to increased confusion and generalized fatigue and was admitted to ICU due to concerns for atrial fibrillation with RVR, hypotension, acute renal failure with hyperkalemia, and acute encephalopathy.       Chronic atrial fibrillation with RVR with severe hypotension, SIRS, and increased anion gap metabolic acidosis  Presence of Watchman left atrial appendage closure device  Known chronic atrial fibrillation and presented with RVR heart rate 150s and severe hypotension with SIRS.  She also presented with increased anion gap metabolic acidosis probably due at least partly to severe hypotension although lactic acid was normal.  Atrial fibrillation was chronically treated with metoprolol XL for rate control.  Severe hypotension with SIRS was attributed to the combination of rapid atrial fibrillation, hypovolemia associated with chronic diuretic therapy, and antihypertensive effect of other previous chronic medication including Entresto, metoprolol and diuretics.  Initial treatment for rapid atrial fibrillation included digoxin 125 mcg IV x1 without change followed by amiodarone bolus and continuous infusion.  Initial treatment for severe hypotension included aggressive IV fluid resuscitation and withholding chronic diuretics and antihypertensive medications.  Subsequent treatment for atrial fibrillation was complicated by behavioral disturbance from encephalopathy at which time she was repeatedly refusing doses of oral medications.   Heart rate improved and remained well-controlled after stopping amiodarone, starting digoxin, and resuming lower dose metoprolol.  By discharge, serum digoxin level was within the therapeutic range.   Because of the presence of her Watchman left atrial appendage closure device, anticoagulation for atrial fibrillation was not recommended.    Acute encephalopathy with paranoia and behavioral disturbance  Old stroke with encephalomalacia, left hemiparesis, and left neuropathic  pain  Aspirin induced platelet function defect  Presented with confusion and fluctuating orientation.  During hospitalization, she had intermittent behavioral disturbance fluctuating between hypoactivity to hyperactivity as well as episodes of paranoia.  She did not tolerate Precedex infusion due to hypotension.  Behavioral disturbance and paranoia were associated with refusal to take oral medications including her chronic medications.  She did not have overt new focal neurologic deficit or seizure activity and head CT was negative for acute abnormalities although multiple areas of encephalomalacia were noted incidentally.  Clinical course was not concerning for acute psychosis.  After investigation, an acute metabolic encephalopathy primarily due to cerebral hypoperfusion from rapid atrial fibrillation with hypotension superimposed upon chronic encephalomalacia after old stroke was suspected.  However, severe uremia probably also contributed to metabolic encephalopathy.  As her hemodynamic status improved and renal function recovered with resolution of severe uremia, signs of encephalopathy subsided and her behavior became cooperative.  She has known chronic left hemiparesis after old stroke along with left-sided neuropathic pain.  Low-dose gabapentin was restarted during this hospitalization for treatment of her neuropathic pain with good clinical response.  She appeared to be weaker compared with previous baseline after this acute illness, so ongoing PT and OT after discharge were recommended.  She was chronically taking aspirin after previous stroke which causes platelet function defect that increases bleeding risk, but active bleeding was not suspected during hospitalization.    Acute renal failure superimposed on stage 3a chronic kidney disease with hyperkalemia and metabolic acidosis  Presented with creatinine 3.12, increased compared with baseline creatinine of approximately 1.1-1.7 (stage IIIa CKD)  concerning for acute renal failure.  Acute renal failure was associated with moderate hyperkalemia with potassium 6.1 and severe uremia with  as well as metabolic acidosis.  After holding diuretics and Entresto, treating with Kayexalate and IV fluids, and restoration of normotension, hyperkalemia and severe uremia and metabolic acidosis both resolved and renal function recovered to baseline with creatinine 0.87 at the time of discharge even after diuretics were restarted although Bumex dose was decreased compared with previously.    Hypomagnesemia  Hypoalbuminemia  Probable malnutrition  Serum magnesium low at 1.6 when initially tested on May 13, improved after supplementation.  Presented with normal serum albumin that subsequently became low during hospitalization likely due to inadequate oral nutritional intake.  Poor oral oral intake in the context of acute illness including severe encephalopathy was suspicious for malnutrition possibly even severe malnutrition.  She was evaluated by registered dietitian during hospitalization who provided recommendations regarding nutritional supplementation.  As her acute medical problems including encephalopathy resolved, her oral intake improved and became more reliable.    Klebsiella bacteriuria  Presented with abnormal UA including pyuria without known recent symptoms suspicious for UTI.  Initial urinary hesitancy and oliguria may have been due to acute renal failure rather than infection.  Although she presented with SIRS criteria including leukocytosis, procalcitonin was low, lactic acid was normal, and leukocytosis rapidly resolved, so sepsis was not suspected.  Two species of Klebsiella grew on UC and she completed a 5 day empiric antibiotic treatment course during hospitalization.    Chronic heart failure with preserved LVEF and chronic right heart failure  Pulmonary hypertension  Previous history of chronic heart failure and transthoracic Echo during this  hospitalization demonstrated normal LV EF but dilated RV with decreased RV systolic function confirming right heart failure.  Echocardiogram also demonstrated findings suspicious for pulmonary hypertension.  Chronic heart failure had previously been treated with Bumex, spironolactone, and Entresto, all of which were held due to persistent hypotension and acute renal failure with hyperkalemia.  After treatment of severe hypotension with aggressive fluid resuscitation, she developed volume overload that was treated with diuretics.  Weight during hospitalization significantly increased to 104.1 kg or 229 to 230 pounds at discharge compared with recent weights of 213 to 218 pounds in early May from the nursing home.  However, despite weight gain, she appeared euvolemic clinically.  Bumex was restarted as she recovered from acute illness but at lower dose of 1 mg once daily.  Spironolactone was restarted.  Entresto was not restarted because she was normotensive, had normal LV EF on echo, and had presented with severe hypotension and acute renal failure.  At discharge, continued monitoring of daily weights and outpatient follow-up with cardiology for CHF was recommended with which they were agreeable.  Cardiology referral was provided specifically to [facility] at their request.    Essential hypertension  Chronic hypertension was previously treated with Bumex, metoprolol XL, Entresto and spironolactone.  Presented with severe hypotension and these chronic medications were initially held.  After treatment, hypotension resolved and blood pressure gradually starting to trend upward.  Metoprolol was restarted at lower dose.  Bumex was restarted at lower dose.  Spironolactone was restarted at previous chronic dose.  She remained normotensive after restarting these medications.  Entresto was not restarted for reasons noted above.    COPD  Previous history of COPD treated chronically with prednisone 5 mg daily and uses  albuterol prn for breakthrough symptoms.      Morbid obesity  Appeared obese with BMI 47 concerning for morbid obesity.    Consultations This Hospital Stay   NEPHROLOGY IP CONSULT  CARDIOLOGY IP CONSULT  VASCULAR ACCESS ADULT IP CONSULT  CARE MANAGEMENT / SOCIAL WORK IP CONSULT  VASCULAR ACCESS ADULT IP CONSULT  PHARMACY IP CONSULT  PHYSICAL THERAPY ADULT IP CONSULT  OCCUPATIONAL THERAPY ADULT IP CONSULT    Code Status   Full Code    Time Spent on this Encounter   I, Saturnino Dominguez MD, personally saw the patient today and spent greater than 30 minutes discharging this patient.  At patient's request, spoke with her daughter Morelia by telephone today on the day of discharge and answered and addressed her questions and concerns.       Saturnino Dominguez MD  [address] MEDICAL SURGICAL  [address] [facility]   [place] [address]  Phone: [phone]  ______________________________________________________________________    Physical Exam   Vital Signs: Temp: 97.5  F (36.4  C) Temp src: Oral BP: 122/70 Pulse: 65   Resp: 18 SpO2: 95 % O2 Device: None (Room air)    Weight: 229 lbs 7.98 oz  General Appearance: Morbidly obese elderly woman without signs of acute distress sitting up in a chair       Primary Care Physician   [facility]    Discharge Orders      Adult Cardiology al [facility] Referral      General info for SNF    Length of Stay Estimate: Long Term Care  Condition at Discharge: Stable  Level of care:skilled   Rehabilitation Potential: Fair  Admission H&P remains valid and up-to-date: Yes  Recent Chemotherapy: N/A  Use Nursing Home Standing Orders: Yes     Mantoux instructions    Give two-step Mantoux (PPD) Per Facility Policy Yes     Follow Up and recommended labs and tests    Follow up with correction physician.  The following labs/tests are recommended: digoxin level and K within 1 week.     Reason for your hospital stay    Hospitalized due to rapid  irregular heart rate (Atrial fibrillation) with very low blood pressure and improved     Daily weights    Call Provider for weight gain of more than 2 pounds per day or 5 pounds per week.     Activity - Up with nursing assistance     Full Code     Physical Therapy Adult Consult    Evaluate and treat as clinically indicated.    Reason:  rapid atrial fibrillation with hypotension, acute renal failure with uremia, and encephalopathy, worsening weakness and prior history stroke with chronic left hemiparesis     Occupational Therapy Adult Consult    Evaluate and treat as clinically indicated.    Reason:  rapid atrial fibrillation with hypotension, acute renal failure with uremia, and encephalopathy, worsening weakness and prior history stroke with chronic left hemiparesis     Fall precautions     Diet    Follow this diet upon discharge: Orders Placed This Encounter      Snacks/Supplements Adult: Magic Cup; With Meals      2 Gram Sodium Diet       Significant Results and Procedures   Most Recent 3 CBC's:  Recent Labs   Lab Test 05/18/24  0540 05/15/24  0622 05/14/24  0513 05/13/24  0816   WBC  --  9.1 8.6 12.1*   HGB 10.0* 10.2* 11.2* 11.7   MCV  --  95 94 95    186  171 187 203     Most Recent 3 BMP's:  Recent Labs   Lab Test 05/18/24  0540 05/17/24  0532 05/16/24  0518 05/15/24  0838 05/15/24  0622    142 139  --  142   POTASSIUM 4.4 4.3 4.5  --  4.1   CHLORIDE 107 112* 112*  --  116*   CO2 23 20* 18*  --  16*   BUN 24.1* 22.2  --   --  19.8   CR 0.87 0.90  --   --  1.14*   ANIONGAP 9 10 9  --  10   ALIDA 8.5* 8.5*  --   --  8.4*   * 94  --  94 101*     Most Recent 2 LFT's:  Recent Labs   Lab Test 05/15/24  0622 05/13/24  0816   AST 17 22   ALT 24 34   ALKPHOS 106 116   BILITOTAL 0.7 0.5     Most Recent Venous Blood Gas  Recent Labs   Lab 05/18/24  0540 05/17/24  0532 05/14/24  0513 05/13/24  0816   PHV 7.36 7.35 7.36 7.38   PCO2V 46 41 28* 26*   PO2V 34 36 67* 78*   HCO3V 26 23 16* 16*   LAZARO 0.3 -2.6  -8.4* -7.6*   O2PER 21 21 21 21        Results for orders placed or performed during the hospital encounter of 05/08/24   CT Head w/o Contrast    Narrative    EXAM: CT HEAD W/O CONTRAST  LOCATION: [facility]  DATE: 5/9/2024    INDICATION: altered mental status  COMPARISON: 3/1/2023.  TECHNIQUE: Routine CT Head without IV contrast. Multiplanar reformats. Dose reduction techniques were used.    FINDINGS:  INTRACRANIAL CONTENTS: No intracranial hemorrhage, extraaxial collection, or mass effect.  No CT evidence of acute infarct. There is diffuse confluent low-attenuation within the periventricular and subcortical white matter consistent with advanced small   vessel ischemia disease. There is encephalomalacia involving the right middle cerebral artery territory, the left parietal lobe, left occipital lobe and the inferior left cerebellum. The ventricular system, basal cisterns and the cortical sulci are   consistent with diffuse volume loss.     VISUALIZED ORBITS/SINUSES/MASTOIDS: No intraorbital abnormality. No paranasal sinus mucosal disease. No middle ear or mastoid effusion.    BONES/SOFT TISSUES: No acute abnormality.      Impression    IMPRESSION:  1.  No CT finding of a mass, hemorrhage or focal area suggestive of acute infarct.  2.  Diffuse age related changes with multiple areas of encephalomalacia as described above.   CT Chest Abdomen Pelvis w/o Contrast    Narrative    EXAM: CT CHEST ABDOMEN PELVIS W/O CONTRAST  LOCATION: [facility]  DATE: 5/9/2024    INDICATION: RLQ tenderness, tachycardia, acute renal failure, dehydration. concern for sepsis  COMPARISON: Noncontrast CT of the chest 4/11/2024.  TECHNIQUE: CT scan of the chest, abdomen, and pelvis was performed without IV contrast. Multiplanar reformats were obtained. Dose reduction techniques were used.   CONTRAST: None.    FINDINGS:   LUNGS AND PLEURA: Irregular consolidation at the posterior  base of the right lower lobe compatible with postpneumonic scarring (images 228-252 of axial series 4). Stable scarring anterior right middle lobe (image 190 of series 4). Mild streaky   scarring/atelectasis of the lower left lung. Small benign calcified granulomata in the right upper and right lower lobes.    MEDIASTINUM/AXILLAE: Enlarged heart. Left atrial appendage occlusion device in position. Small calcified right hilar and mediastinal lymph nodes related to benign granulomatous disease.    CORONARY ARTERY CALCIFICATION: Severe.    HEPATOBILIARY: Postcholecystectomy. Lobulated contour of the liver concerning for cirrhosis.    PANCREAS: Normal.    SPLEEN: Numerous tiny benign calcified granulomata.    ADRENAL GLANDS: Normal.    KIDNEYS/BLADDER: 2 mm nonobstructing stones in both renal collecting systems. Mild cortical scarring of the left kidney. Small benign cysts of the kidneys not warranting follow-up. Normal bladder.    BOWEL: Colonic diverticulosis. No bowel obstruction or inflammation. Normal appendix. No free fluid or gas.    LYMPH NODES: Normal.    VASCULATURE: Moderate aortoiliac atherosclerosis.    PELVIC ORGANS: Normal.    MUSCULOSKELETAL: Degenerative changes of the spine. Mild convex left lumbar scoliosis.      Impression    IMPRESSION:  1.  No acute process identified in the chest, abdomen or pelvis.  2.  Areas of scarring in both lungs.  3.  Cardiomegaly and severe coronary artery calcification.  4.  Lobulated contour of the liver concerning for cirrhosis.  5.  Bilateral nonobstructive nephrolithiasis.  6.  Colonic diverticulosis.   US Renal Complete Non-Vascular    Narrative    ULTRASOUND RENAL COMPLETE NONVASCULAR  5/9/2024 9:23 AM    CLINICAL HISTORY:  Acute renal failure.    TECHNIQUE: Routine Bilateral Renal and Bladder Ultrasound.    COMPARISON: CT chest, abdomen and pelvis 5/9/2024.    FINDINGS:  RIGHT KIDNEY: 9.8 cm. Normal echogenicity without hydronephrosis or  masses.     LEFT KIDNEY:  8.8 cm. Normal echogenicity without hydronephrosis or  masses. Left renal cyst measuring 1.8 cm; no follow-up necessary.    BLADDER: Not visualized, probably decompressed.      Impression    IMPRESSION:  No collecting system dilatation.    KRISTIN SCOTT MD         SYSTEM ID:  [id number]   XR Chest Port 1 View    Narrative    CHEST ONE VIEW PORTABLE   2024 2:23 PM     HISTORY: RN placed PICC - verify tip placement.    COMPARISON: Chest x-ray 2024.      Impression    IMPRESSION:   1. Abnormal positioning of a right PICC line, its tip folded upon  itself and distally projects over the right axillary soft tissues.  Recommend repositioning.  2. Cardiomegaly. No acute airspace disease. Calcified granulomas on  the right.    KAYLYNN DARNELL MD         SYSTEM ID:  [id number]   XR Chest Port 1 View    Narrative    CHEST ONE VIEW PORTABLE  2024 8:54 AM    HISTORY: RN placed PICC - verify tip placement.    COMPARISON: 2024    FINDINGS/    Impression    IMPRESSION: A left upper extremity PICC line is present with the tip  in the region of the low SVC. Heart size is mildly enlarged and  similar to comparison. There is mild central vascular prominence and  mild interstitial thickening, suggestive of interstitial edema and/or  scarring. No airspace consolidation or pneumothorax. Several  similar-appearing increased density nodules, favoring granulomas, in  the right lung. Minimal blunting of the costophrenic angles, which may  represent atelectasis, pleural thickening, or trace pleural fluid. No  large volume pleural fluid collection. No acute osseous abnormality.    BRYANT FULLER MD         SYSTEM ID:  [id number]   Echocardiogram Complete     Value    LVEF  60-65%    Narrative    [id number]  [id number]  [id number]  [id number]^DOUG^TAWNY^SOPHIA     [facility]  Echocardiography Laboratory  [address] [place] [address]     Name: DARBY HOUSER  MRN: [id number]  :  1950  Study Date: 05/10/2024 07:50 AM  Age: 73 yrs  Gender: Female  Patient [facility]: [facility]  Reason For Study: CHF, Shock  History: ckd, afib  Ordering Physician: TAWNY CAMACHO  Performed By: Dodie Knight     BSA: 1.9 m2  Height: 59 in  Weight: 225 lb  HR: 86  BP: 85/57 mmHg  ______________________________________________________________________________  Procedure  Complete Portable Echo Adult.  ______________________________________________________________________________  Interpretation Summary     Left ventricular systolic function is normal. The visual ejection fraction is  60-65%. No regional wall motion abnormalities noted.  The right ventricle is moderately dilated. Moderately decreased right  ventricular systolic function.  There is severe biatrial enlargement.  There is moderate (2+) tricuspid regurgitation.  The right ventricular systolic pressure is elevated at 49.1 mmHg + RA  pressure.  RA pressure estimated at 15 mmHg or greater.  The rhythm was rapid atrial fibrillation.  No prior study.  ______________________________________________________________________________  Left Ventricle  The left ventricle is normal in size. There is concentric remodeling present.  Diastolic function not assessed due to atrial fibrillation. Left ventricular  systolic function is normal. The visual ejection fraction is 60-65%. No  regional wall motion abnormalities noted.     Right Ventricle  The right ventricle is moderately dilated. Moderately decreased right  ventricular systolic function.     Atria  There is severe biatrial enlargement.     Mitral Valve  There is mild (1+) mitral regurgitation.     Tricuspid Valve  There is moderate (2+) tricuspid regurgitation. The right ventricular systolic  pressure is approximated at 49.1 mmHg plus the right atrial pressure.     Aortic Valve  The aortic valve is not well visualized. No aortic stenosis is present.     Pulmonic Valve  The pulmonic valve is not  well visualized.     Vessels  Normal size aorta. Normal size ascending aorta. IVC diameter >2.1 cm  collapsing <50% with sniff suggests a high RA pressure estimated at 15 mmHg or  greater.     Rhythm  The rhythm was rapid atrial fibrillation.     ______________________________________________________________________________  MMode/2D Measurements & Calculations  IVSd: 1.4 cm  LVIDd: 3.6 cm  LVIDs: 2.7 cm  LVPWd: 1.3 cm  FS: 24.6 %     LV mass(C)d: 173.1 grams  LV mass(C)dI: 89.3 grams/m2  Ao root diam: 3.6 cm  LA dimension: 4.1 cm  asc Aorta Diam: 3.4 cm  LA/Ao: 1.1  Ao root diam index Ht(cm/m): 2.4  Ao root diam index BSA (cm/m2): 1.9  Asc Ao diam index BSA (cm/m2): 1.8  Asc Ao diam index Ht(cm/m): 2.3  EF Biplane: 86.7 %  LA Volume (BP): 51.2 ml     LA Volume Index (BP): 26.4 ml/m2  RWT: 0.74  TAPSE: 1.2 cm     Doppler Measurements & Calculations  MV E max damaso: 107.0 cm/sec  MV dec time: 0.13 sec  Ao V2 max: 96.4 cm/sec  Ao max P.0 mmHg  LV V1 max P.0 mmHg  LV V1 max: 71.1 cm/sec  PA V2 max: 77.2 cm/sec  PA max P.4 mmHg  PA acc time: 0.13 sec  TR max damaso: 350.2 cm/sec  TR max P.1 mmHg  AV Damaso Ratio (DI): 0.74  Medial E/e': 16.1  RV S Damaso: 7.6 cm/sec     ______________________________________________________________________________  Report approved by: Laura Purvis 05/10/2024 11:16 AM             Discharge Medications   Current Discharge Medication List        START taking these medications    Details   albuterol (PROAIR HFA/PROVENTIL HFA/VENTOLIN HFA) 108 (90 Base) MCG/ACT inhaler Inhale 2 puffs into the lungs every 6 hours as needed for shortness of breath, wheezing or cough    Comments: Pharmacy may dispense brand covered by insurance (Proair, or proventil or ventolin or generic albuterol inhaler)    Use with aerochamber spacer device  Associated Diagnoses: Chronic obstructive pulmonary disease, unspecified COPD type (H)      digoxin (LANOXIN) 62.5 MCG tablet Take 1 tablet (62.5 mcg) by  mouth daily    Associated Diagnoses: Atrial fibrillation with RVR (H)      gabapentin (NEURONTIN) 100 MG capsule Take 2 capsules (200 mg) by mouth 3 times daily    Associated Diagnoses: Neuropathic pain      metoprolol tartrate (LOPRESSOR) 25 MG tablet Take 1 tablet (25 mg) by mouth 2 times daily    Associated Diagnoses: Atrial fibrillation with RVR (H)      multivitamin w/minerals (THERA-VIT-M) tablet Take 1 tablet by mouth daily    Associated Diagnoses: Neuropathic pain           CONTINUE these medications which have CHANGED    Details   acetaminophen (TYLENOL) 500 MG tablet Take 2 tablets (1,000 mg) by mouth 3 times daily    Associated Diagnoses: Neuropathic pain      bumetanide (BUMEX) 1 MG tablet Take 1 tablet (1 mg) by mouth daily    Associated Diagnoses: Chronic right heart failure (H); Pulmonary hypertension (H)      traMADol (ULTRAM) 50 MG tablet Take 1 tablet (50 mg) by mouth every 8 hours as needed for severe pain  Qty: 20 tablet, Refills: 0    Associated Diagnoses: Neuropathic pain           CONTINUE these medications which have NOT CHANGED    Details   albuterol (PROVENTIL) (2.5 MG/3ML) 0.083% neb solution Take 2.5 mg by nebulization every 6 hours as needed for shortness of breath, wheezing or cough      aspirin 81 MG EC tablet Take 81 mg by mouth daily      atorvastatin (LIPITOR) 40 MG tablet Take 40 mg by mouth daily      fluticasone (FLONASE) 50 MCG/ACT nasal spray Spray 1 spray into both nostrils daily      predniSONE (DELTASONE) 5 MG tablet Take 5 mg by mouth daily      spironolactone (ALDACTONE) 25 MG tablet Take 25 mg by mouth daily           STOP taking these medications       cetirizine (ZYRTEC) 10 MG tablet Comments:   Reason for Stopping:         diclofenac (VOLTAREN) 1 % topical gel Comments:   Reason for Stopping:         methyl salicylate external liquid Comments:   Reason for Stopping:         metoprolol succinate ER (TOPROL XL) 25 MG 24 hr tablet Comments:   Reason for Stopping:          potassium chloride peyman ER (KLOR-CON M20) 20 MEQ CR tablet Comments:   Reason for Stopping:         sacubitril-valsartan (ENTRESTO) 24-26 MG per tablet Comments:   Reason for Stopping:             Allergies   Allergies   Allergen Reactions    Oxycodone Confusion     Patient had confusion, paranoid, aggression following administration.  Per family this has happened with narcotics in the past as well (details unknown)

## 2024-05-18 NOTE — PLAN OF CARE
Goal Outcome Evaluation:      Plan of Care Reviewed With: patient    Overall Patient Progress: no change    Outcome Evaluation: Shift Note: 3am - 7am. A&Ox4. Plan is for discharge today, returning to [facility]. Transport arranged for 11:00am. Patient typically ambulates with walker per her report, currently requiring kailee steady. 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 Bladder scanned this AM for volume of 250cc. Tele a. fib, chronic.

## 2024-05-18 NOTE — PROGRESS NOTES
S-(situation): Patient discharged to [facility] via own wheelchair  with ARX w/c ride     B-(background): Admitted d/t Chronic atrial fibrillation with RVR with severe hypotension, SIRS, and increased anion gap metabolic acidosis  Presence of Watchman left atrial appendage closure device    A-(assessment): A and Ox4. VSS and afebrile. Heart monitor A-fib CVR. Mary steady to commode and bed. Complained of tolerable pain while moving. On room air.  Last bowel movement: 5/18/24     R-(recommendations):Report called to BERNICE Gardner. Listed belongings gathered and sent with patient.     Discharge Nursing Criteria:     Care Plan and Patient education resolved: Yes    Vaccines  Influenza status verified at discharge:  No    Intentionally Retained Items No     MISC  Home medications returned to patient: NA  Medication Bin checked and emptied on discharge Yes  All paperwork sent with patient/Copy of AVS given to patient or family Yes.

## 2024-05-23 ENCOUNTER — LAB REQUISITION (OUTPATIENT)
Dept: LAB | Facility: CLINIC | Age: 74
End: 2024-05-23
Payer: COMMERCIAL

## 2024-05-23 DIAGNOSIS — I48.91 UNSPECIFIED ATRIAL FIBRILLATION (H): ICD-10-CM

## 2024-05-24 LAB — DIGOXIN SERPL-MCNC: 0.6 NG/ML (ref 0.6–2)

## 2024-05-24 PROCEDURE — 80162 ASSAY OF DIGOXIN TOTAL: CPT | Mod: ORL | Performed by: FAMILY MEDICINE

## 2024-05-24 PROCEDURE — P9603 ONE-WAY ALLOW PRORATED MILES: HCPCS | Mod: ORL | Performed by: FAMILY MEDICINE

## 2024-05-24 PROCEDURE — 36415 COLL VENOUS BLD VENIPUNCTURE: CPT | Mod: ORL | Performed by: FAMILY MEDICINE

## 2024-06-02 ENCOUNTER — HEALTH MAINTENANCE LETTER (OUTPATIENT)
Age: 74
End: 2024-06-02

## 2024-06-04 ENCOUNTER — OFFICE VISIT (OUTPATIENT)
Dept: CARDIOLOGY | Facility: CLINIC | Age: 74
End: 2024-06-04
Attending: PEDIATRICS
Payer: COMMERCIAL

## 2024-06-04 VITALS
BODY MASS INDEX: 45.56 KG/M2 | OXYGEN SATURATION: 94 % | HEART RATE: 91 BPM | HEIGHT: 59 IN | SYSTOLIC BLOOD PRESSURE: 122 MMHG | RESPIRATION RATE: 16 BRPM | WEIGHT: 226 LBS | DIASTOLIC BLOOD PRESSURE: 68 MMHG

## 2024-06-04 DIAGNOSIS — I48.91 ATRIAL FIBRILLATION WITH RVR (H): ICD-10-CM

## 2024-06-04 DIAGNOSIS — I27.20 PULMONARY HYPERTENSION (H): ICD-10-CM

## 2024-06-04 DIAGNOSIS — I50.812 CHRONIC RIGHT HEART FAILURE (H): ICD-10-CM

## 2024-06-04 PROCEDURE — 99204 OFFICE O/P NEW MOD 45 MIN: CPT | Performed by: INTERNAL MEDICINE

## 2024-06-04 RX ORDER — CALCIUM CARBONATE 500 MG/1
500 TABLET, CHEWABLE ORAL DAILY
COMMUNITY
Start: 2023-04-13

## 2024-06-04 RX ORDER — FUROSEMIDE 40 MG
40 TABLET ORAL DAILY
Qty: 30 TABLET | Refills: 11 | Status: SHIPPED | OUTPATIENT
Start: 2024-06-04 | End: 2024-09-03

## 2024-06-04 ASSESSMENT — PAIN SCALES - GENERAL: PAINLEVEL: NO PAIN (0)

## 2024-06-04 NOTE — LETTER
6/4/2024    [facility]  [address] [facility] [address]    RE: Jayashree Carson       Dear Colleague,     I had the pleasure of seeing Jayashree Carson in the [facility].  CARDIOLOGY CONSULT    REASON FOR CONSULT: atrial fibrillation    PRIMARY CARE PHYSICIAN:  [facility]    HISTORY OF PRESENT ILLNESS:  Ms. Carson is a very pleasant 74 year old woman with PMH significant for permanent atrial fibrillation s/p watchman, pulmonary hypertension, CKD stage III who presents for post hospital follow up.  Jayashree was hospitalized 5/8/24-5/18/24 with SIRS, NIC on CKD, atrial fibrillation with RVR and weakness/encephalopathy.  She has a hx of CHFpEF and has been maintained on entresto, bumex, metoprolol and spironolactone which were held due to hypotension.  AT discharge, the spironolactone was resumed and metoprolol at a lower dose.   Jayashree notes that she was previously on lasix which she tolerated much better due to less urination.  She reports being miserable on bumex and strongly requests to be switched back to lasix.  She denies any chest pain, chest discomfort or shortness of breath. She denies any weight gain, lower extremity edema.  She is otherwise without complaints.      PAST MEDICAL HISTORY:  No past medical history on file.    MEDICATIONS:  Current Outpatient Medications   Medication Sig Dispense Refill    acetaminophen (TYLENOL) 500 MG tablet Take 2 tablets (1,000 mg) by mouth 3 times daily      albuterol (PROAIR HFA/PROVENTIL HFA/VENTOLIN HFA) 108 (90 Base) MCG/ACT inhaler Inhale 2 puffs into the lungs every 6 hours as needed for shortness of breath, wheezing or cough      albuterol (PROVENTIL) (2.5 MG/3ML) 0.083% neb solution Take 2.5 mg by nebulization every 6 hours as needed for shortness of breath, wheezing or cough      aspirin 81 MG EC tablet Take 81 mg by mouth daily      atorvastatin (LIPITOR) 40 MG tablet Take  40 mg by mouth daily      bumetanide (BUMEX) 1 MG tablet Take 1 tablet (1 mg) by mouth daily      digoxin (LANOXIN) 62.5 MCG tablet Take 1 tablet (62.5 mcg) by mouth daily      fluticasone (FLONASE) 50 MCG/ACT nasal spray Spray 1 spray into both nostrils daily      gabapentin (NEURONTIN) 100 MG capsule Take 2 capsules (200 mg) by mouth 3 times daily      metoprolol tartrate (LOPRESSOR) 25 MG tablet Take 1 tablet (25 mg) by mouth 2 times daily      multivitamin w/minerals (THERA-VIT-M) tablet Take 1 tablet by mouth daily      predniSONE (DELTASONE) 5 MG tablet Take 5 mg by mouth daily      spironolactone (ALDACTONE) 25 MG tablet Take 25 mg by mouth daily      traMADol (ULTRAM) 50 MG tablet Take 1 tablet (50 mg) by mouth every 8 hours as needed for severe pain 20 tablet 0     No current facility-administered medications for this visit.       ALLERGIES:  Allergies   Allergen Reactions    Oxycodone Confusion     Patient had confusion, paranoid, aggression following administration.  Per family this has happened with narcotics in the past as well (details unknown)       SOCIAL HISTORY:  I have reviewed this patient's social history and updated it with pertinent information if needed. Jayashree Carson      FAMILY HISTORY:  I have reviewed this patient's family history and updated it with pertinent information if needed.   No family history on file.    REVIEW OF SYSTEMS:  A complete ROS was obtained and the pertinent positives are outlined in the history of present illness above.  The remainder of systems is negative.      PHYSICAL EXAM:                     Vital Signs with Ranges     0 lbs 0 oz    Constitutional: awake, alert, no distress  Eyes: PERRL, sclera nonicteric  ENT: trachea midline  Respiratory: CTAB  Cardiovascular: Irregularly irregular, no m/r/g, no JVD  GI: nondistended, nontender, bowel sounds present  Lymph/Hematologic: no lymphadenopathy  Skin: dry, no rash  Musculoskeletal: good muscle tone, strength 5/5  in upper and lower extremities  Neurologic: no focal deficits  Neuropsychiatric: appropriate affact    DATA:  Labs:   Labs dated 5/1824 reviewed personally including BMP, hemoglobin, digoxin    EKG: Dated 5/8/24 reviewed personally.  Atrial fibrillation with RVR, no diagnostic ST segment changes      Echocardiogram dated 5/10/24 images reviewed personally  Left ventricular systolic function is normal. The visual ejection fraction is  60-65%. No regional wall motion abnormalities noted.  The right ventricle is moderately dilated. Moderately decreased right  ventricular systolic function.  There is severe biatrial enlargement.  There is moderate (2+) tricuspid regurgitation.  The right ventricular systolic pressure is elevated at 49.1 mmHg + RA  pressure.  RA pressure estimated at 15 mmHg or greater.  The rhythm was rapid atrial fibrillation.  No prior study.        ASSESSMENT:   Permanent atrial fibrillation:  Rate controlled, asymptomatic.  On metoprolol.  S/P watchman.   CHFpEF:  Previously on entresto, bumex, sprinolactone which were held during hospitalization due to hypotension. Bumex and spironolactone resumed.  Class II symptoms and euvolemic at the current weight.    CKD stage IIIa    RECOMMENDATIONS:  1. Reviewed recent hospitalization in detail.  BP remains soft.  Patient reports distress on bumex due to frequent urination and strongly requests being switched back to lasix.  Agree to stop bumex and start lasix 40 mg daily.  Discussed importance of continued daily weights and to contact our office with weight gain parameters.  Will hold off on resuming entresto for now; could add low dose back in the future.  2.  Plan for follow up in 3 months with Mary More IVON in 3 months or before than as necessary    Nancy Smith MD [facility]  June 4, 2024    I spent a total of 45 minutes providing patient care.  This time spent includes chart review, time spent with the patient during the clinic  visit and time spent afterwards providing necessary documentation.           Thank you for allowing me to participate in the care of your patient.      Sincerely,     Nancy Smith MD     [facility]  cc:   Saturnino Dominguez MD   [facility] [place] [address]

## 2024-06-04 NOTE — PROGRESS NOTES
CARDIOLOGY CONSULT    REASON FOR CONSULT: atrial fibrillation    PRIMARY CARE PHYSICIAN:  [facility]    HISTORY OF PRESENT ILLNESS:  Ms. Carson is a very pleasant 74 year old woman with PMH significant for permanent atrial fibrillation s/p watchman, pulmonary hypertension, CKD stage III who presents for post hospital follow up.  Jayashree was hospitalized 5/8/24-5/18/24 with SIRS, NIC on CKD, atrial fibrillation with RVR and weakness/encephalopathy.  She has a hx of CHFpEF and has been maintained on entresto, bumex, metoprolol and spironolactone which were held due to hypotension.  AT discharge, the spironolactone was resumed and metoprolol at a lower dose.   Jayashree notes that she was previously on lasix which she tolerated much better due to less urination.  She reports being miserable on bumex and strongly requests to be switched back to lasix.  She denies any chest pain, chest discomfort or shortness of breath. She denies any weight gain, lower extremity edema.  She is otherwise without complaints.      PAST MEDICAL HISTORY:  No past medical history on file.    MEDICATIONS:  Current Outpatient Medications   Medication Sig Dispense Refill    acetaminophen (TYLENOL) 500 MG tablet Take 2 tablets (1,000 mg) by mouth 3 times daily      albuterol (PROAIR HFA/PROVENTIL HFA/VENTOLIN HFA) 108 (90 Base) MCG/ACT inhaler Inhale 2 puffs into the lungs every 6 hours as needed for shortness of breath, wheezing or cough      albuterol (PROVENTIL) (2.5 MG/3ML) 0.083% neb solution Take 2.5 mg by nebulization every 6 hours as needed for shortness of breath, wheezing or cough      aspirin 81 MG EC tablet Take 81 mg by mouth daily      atorvastatin (LIPITOR) 40 MG tablet Take 40 mg by mouth daily      bumetanide (BUMEX) 1 MG tablet Take 1 tablet (1 mg) by mouth daily      digoxin (LANOXIN) 62.5 MCG tablet Take 1 tablet (62.5 mcg) by mouth daily      fluticasone (FLONASE) 50 MCG/ACT nasal spray Spray 1  spray into both nostrils daily      gabapentin (NEURONTIN) 100 MG capsule Take 2 capsules (200 mg) by mouth 3 times daily      metoprolol tartrate (LOPRESSOR) 25 MG tablet Take 1 tablet (25 mg) by mouth 2 times daily      multivitamin w/minerals (THERA-VIT-M) tablet Take 1 tablet by mouth daily      predniSONE (DELTASONE) 5 MG tablet Take 5 mg by mouth daily      spironolactone (ALDACTONE) 25 MG tablet Take 25 mg by mouth daily      traMADol (ULTRAM) 50 MG tablet Take 1 tablet (50 mg) by mouth every 8 hours as needed for severe pain 20 tablet 0     No current facility-administered medications for this visit.       ALLERGIES:  Allergies   Allergen Reactions    Oxycodone Confusion     Patient had confusion, paranoid, aggression following administration.  Per family this has happened with narcotics in the past as well (details unknown)       SOCIAL HISTORY:  I have reviewed this patient's social history and updated it with pertinent information if needed. Jayashree Carson      FAMILY HISTORY:  I have reviewed this patient's family history and updated it with pertinent information if needed.   No family history on file.    REVIEW OF SYSTEMS:  A complete ROS was obtained and the pertinent positives are outlined in the history of present illness above.  The remainder of systems is negative.      PHYSICAL EXAM:                     Vital Signs with Ranges     0 lbs 0 oz    Constitutional: awake, alert, no distress  Eyes: PERRL, sclera nonicteric  ENT: trachea midline  Respiratory: CTAB  Cardiovascular: Irregularly irregular, no m/r/g, no JVD  GI: nondistended, nontender, bowel sounds present  Lymph/Hematologic: no lymphadenopathy  Skin: dry, no rash  Musculoskeletal: good muscle tone, strength 5/5 in upper and lower extremities  Neurologic: no focal deficits  Neuropsychiatric: appropriate affact    DATA:  Labs:   Labs dated 5/1824 reviewed personally including BMP, hemoglobin, digoxin    EKG: Dated 5/8/24 reviewed  personally.  Atrial fibrillation with RVR, no diagnostic ST segment changes      Echocardiogram dated 5/10/24 images reviewed personally  Left ventricular systolic function is normal. The visual ejection fraction is  60-65%. No regional wall motion abnormalities noted.  The right ventricle is moderately dilated. Moderately decreased right  ventricular systolic function.  There is severe biatrial enlargement.  There is moderate (2+) tricuspid regurgitation.  The right ventricular systolic pressure is elevated at 49.1 mmHg + RA  pressure.  RA pressure estimated at 15 mmHg or greater.  The rhythm was rapid atrial fibrillation.  No prior study.        ASSESSMENT:   Permanent atrial fibrillation:  Rate controlled, asymptomatic.  On metoprolol.  S/P watchman.   CHFpEF:  Previously on entresto, bumex, sprinolactone which were held during hospitalization due to hypotension. Bumex and spironolactone resumed.  Class II symptoms and euvolemic at the current weight.    CKD stage IIIa    RECOMMENDATIONS:  1. Reviewed recent hospitalization in detail.  BP remains soft.  Patient reports distress on bumex due to frequent urination and strongly requests being switched back to lasix.  Agree to stop bumex and start lasix 40 mg daily.  Discussed importance of continued daily weights and to contact our office with weight gain parameters.  Will hold off on resuming entresto for now; could add low dose back in the future.  2.  Plan for follow up in 3 months with Mary More IVON in 3 months or before than as necessary    Nancy Smith MD [facility]  June 4, 2024    I spent a total of 45 minutes providing patient care.  This time spent includes chart review, time spent with the patient during the clinic visit and time spent afterwards providing necessary documentation.

## 2024-06-04 NOTE — PATIENT INSTRUCTIONS
-Stop bumex.  -Start lasix 40 mg daily  -Follow up in 3 month with cardiology IVON Mary DEL VALLE

## 2024-09-03 ENCOUNTER — OFFICE VISIT (OUTPATIENT)
Dept: CARDIOLOGY | Facility: CLINIC | Age: 74
End: 2024-09-03
Attending: INTERNAL MEDICINE
Payer: COMMERCIAL

## 2024-09-03 VITALS
BODY MASS INDEX: 45.56 KG/M2 | HEIGHT: 59 IN | SYSTOLIC BLOOD PRESSURE: 124 MMHG | RESPIRATION RATE: 18 BRPM | HEART RATE: 71 BPM | DIASTOLIC BLOOD PRESSURE: 80 MMHG | OXYGEN SATURATION: 95 % | WEIGHT: 226 LBS

## 2024-09-03 DIAGNOSIS — I50.812 CHRONIC RIGHT HEART FAILURE (H): ICD-10-CM

## 2024-09-03 LAB
ANION GAP SERPL CALCULATED.3IONS-SCNC: 11 MMOL/L (ref 7–15)
BUN SERPL-MCNC: 43.5 MG/DL (ref 8–23)
CALCIUM SERPL-MCNC: 9.6 MG/DL (ref 8.8–10.4)
CHLORIDE SERPL-SCNC: 105 MMOL/L (ref 98–107)
CREAT SERPL-MCNC: 1.38 MG/DL (ref 0.51–0.95)
EGFRCR SERPLBLD CKD-EPI 2021: 39 ML/MIN/1.73M2
GLUCOSE SERPL-MCNC: 112 MG/DL (ref 70–99)
HCO3 SERPL-SCNC: 25 MMOL/L (ref 22–29)
HGB BLD-MCNC: 13 G/DL (ref 11.7–15.7)
NT-PROBNP SERPL-MCNC: 1951 PG/ML (ref 0–1800)
POTASSIUM SERPL-SCNC: 4.6 MMOL/L (ref 3.4–5.3)
SODIUM SERPL-SCNC: 141 MMOL/L (ref 135–145)
TSH SERPL DL<=0.005 MIU/L-ACNC: 2.1 UIU/ML (ref 0.3–4.2)

## 2024-09-03 PROCEDURE — 80048 BASIC METABOLIC PNL TOTAL CA: CPT | Performed by: PHYSICIAN ASSISTANT

## 2024-09-03 PROCEDURE — 83880 ASSAY OF NATRIURETIC PEPTIDE: CPT | Performed by: PHYSICIAN ASSISTANT

## 2024-09-03 PROCEDURE — 99214 OFFICE O/P EST MOD 30 MIN: CPT | Performed by: PHYSICIAN ASSISTANT

## 2024-09-03 PROCEDURE — 85018 HEMOGLOBIN: CPT | Performed by: PHYSICIAN ASSISTANT

## 2024-09-03 PROCEDURE — 84443 ASSAY THYROID STIM HORMONE: CPT | Performed by: PHYSICIAN ASSISTANT

## 2024-09-03 PROCEDURE — 36415 COLL VENOUS BLD VENIPUNCTURE: CPT | Performed by: PHYSICIAN ASSISTANT

## 2024-09-03 PROCEDURE — G2211 COMPLEX E/M VISIT ADD ON: HCPCS | Performed by: PHYSICIAN ASSISTANT

## 2024-09-03 RX ORDER — CETIRIZINE HYDROCHLORIDE 10 MG/1
TABLET ORAL
COMMUNITY
Start: 2024-08-01

## 2024-09-03 RX ORDER — FUROSEMIDE 40 MG
TABLET ORAL
Qty: 90 TABLET | Refills: 3 | Status: SHIPPED | OUTPATIENT
Start: 2024-09-03

## 2024-09-03 NOTE — LETTER
"9/3/2024    [facility]  [address] [facility] [address]    RE: Jayashree MON Alli       Dear Colleague,     I had the pleasure of seeing Jayashree Carson in the [facility].    Cardiology Clinic Progress Note    Service Date: September 3, 2024    Primary Cardiologist: rebecca Smith      Reason for Visit: afib, hfpef     HPI:   Jayashree Carson is a 74-year-old woman with past medical history significant for the followin.  Permanent atrial fibrillation with Watchman in place  2.  CKD  3.  HFpEF  4.  Moderate TR  5.  Elevated pulmonary pressures with RVSP 49 + RAP on echo   6.  COPD  7.  Wheelchair-bound due to history of CVA with left-sided weakness.    Radha is very frustrated today, she states she hates her water pill, she is incontinent and uses briefs and because of this she often has rashes.  She denies chest pain, orthopnea, shortness of breath or PND.  She is weight in her wheelchair and her weights been stable at about 226 pounds.  She expresses frustrations about them not fixing her wheelchair, brace has not been put on right and overall things not getting done.  She's not sure she needs a water pill at all.    Social History:  She lives at [facility].  She has a daughter who typically advocates for her but she is busy taking care of her granddaughter who has cancer.  Patients live there about 1 year.    Physical Exam:  /80   Pulse 71   Resp 18   Ht 1.499 m (4' 11\")   Wt 102.5 kg (226 lb)   SpO2 95%   BMI 45.65 kg/m     Elderly, frail-appearing woman in no acute distress.  Obvious left-sided weakness and contractures.  Heart is irregularly irregular, I do not appreciate murmur rub or gallop.  Lungs are clear without wheezes rales or rhonchi.  Right leg without edema, left with 1+ pitting edema to her mid shin.  Skin is warm and dry.    Data reviewed:  Last labs  show creatinine 0.87 potassium 4.4 dig level 1.1 hemoglobin " 10.0    Assessment and Plan:  Permanent atrial fibrillation with Watchman device in place rates appear controlled on exam today per plan on aspirin.    2.  Heart failure with preserved ejection fraction primarily with right-sided symptoms, patient does not feel like she needs the water pill although I suspect she clearly does.  We will switch her to a 3-day a week dosing at a higher dose this will be 80 mg on Monday Wednesday Friday to see if this at least gives her a few days off.  She is otherwise on spironolactone 25 mg daily, and had been on Jardiance although this must have been discontinued since she has been in the hospital.  Given her incontinence I do think it is reasonable as she is high risk for UTI.  Will see if we can manage her volume status with other medications.  I will not add Entresto at this time as I do not want her to develop hypotension.    3.  CKD, repeat BMP today this will help us assess diuretic use as well.    This patient should remain on a low-sodium diet.  Hopefully we can keep her weight stable with 3 times weekly diuretic dosing.  Please call if her weight goes up more than 3 pounds in a day or 5 pounds in a week will likely need to go back to either daily dosing or just more days during the week.  Will see her back in 3 months with labs before that visit.  She will otherwise get a hemoglobin, BMP, BNP today.  Thank you for allowing me to participate in this patient's care.    Mary Green PA-C  [facility]     This note was written using Dragon voice recognition system, please excuse any misspelled names, or nonsensical words and call with any questions.      The longitudinal plan of care for the diagnosis(es)/condition(s) as documented were addressed during this visit. Due to the added complexity in care, I will continue to support Jayashree in the subsequent management and with ongoing continuity of care.    Orders this visit:  Orders Placed This Encounter   Procedures      Basic metabolic panel     Hemoglobin     TSH with free T4 reflex     N terminal pro BNP outpatient     Basic metabolic panel     N terminal pro BNP outpatient     CBC with platelets     Follow-Up with Cardiology IVON     Orders Placed This Encounter   Medications     cetirizine (ZYRTEC) 10 MG tablet     furosemide (LASIX) 40 MG tablet     Si mg on Monday, Wednesday and Friday     Dispense:  90 tablet     Refill:  3     Medications Discontinued During This Encounter   Medication Reason     furosemide (LASIX) 40 MG tablet      Encounter Diagnosis   Name Primary?     Chronic right heart failure (H)        Current Medications:  Current Outpatient Medications   Medication Sig Dispense Refill     aspirin 81 MG EC tablet Take 81 mg by mouth daily       atorvastatin (LIPITOR) 40 MG tablet Take 40 mg by mouth daily       calcium carbonate (TUMS) 500 MG chewable tablet Take 500 mg by mouth daily       cetirizine (ZYRTEC) 10 MG tablet        digoxin (LANOXIN) 62.5 MCG tablet Take 1 tablet (62.5 mcg) by mouth daily       fluticasone (FLONASE) 50 MCG/ACT nasal spray Spray 1 spray into both nostrils daily       furosemide (LASIX) 40 MG tablet 80 mg on Monday, Wednesday and Friday 90 tablet 3     gabapentin (NEURONTIN) 100 MG capsule Take 2 capsules (200 mg) by mouth 3 times daily       metoprolol tartrate (LOPRESSOR) 25 MG tablet Take 1 tablet (25 mg) by mouth 2 times daily       multivitamin w/minerals (THERA-VIT-M) tablet Take 1 tablet by mouth daily       predniSONE (DELTASONE) 5 MG tablet Take 5 mg by mouth daily       spironolactone (ALDACTONE) 25 MG tablet Take 25 mg by mouth daily       traMADol (ULTRAM) 50 MG tablet Take 1 tablet (50 mg) by mouth every 8 hours as needed for severe pain 20 tablet 0     acetaminophen (TYLENOL) 500 MG tablet Take 2 tablets (1,000 mg) by mouth 3 times daily       albuterol (PROAIR HFA/PROVENTIL HFA/VENTOLIN HFA) 108 (90 Base) MCG/ACT inhaler Inhale 2 puffs into the lungs every 6 hours  as needed for shortness of breath, wheezing or cough       albuterol (PROVENTIL) (2.5 MG/3ML) 0.083% neb solution Take 2.5 mg by nebulization every 6 hours as needed for shortness of breath, wheezing or cough         Allergies Reviewed and Updated:  Allergies   Allergen Reactions     Oxycodone Confusion     Patient had confusion, paranoid, aggression following administration.  Per family this has happened with narcotics in the past as well (details unknown)     Blood-Group Specific Substance Unknown     Patient has a non-specific antibody.  Blood Product orders may be delayed.  Draw one red top and two purple top tubes for ALL Type and Screen/ Type and Crossmatch orders.     Iodinated Contrast Media      RASH     Lisinopril Unknown     Taken off per cardiology     Sulfamethoxazole-Trimethoprim      STOMACH CRAMPS       Review of Systems:  Skin:        Eyes:       ENT:       Respiratory:  Positive for dyspnea on exertion  Cardiovascular:  Negative for    Gastroenterology:      Genitourinary:       Musculoskeletal:       Neurologic:       Psychiatric:       Heme/Lymph/Imm:       Endocrine:          Pertinent Past Medical, Surgical and Family History Reviewed and noted above.     CC  Nancy Smith MD  [address] [address]      Thank you for allowing me to participate in the care of your patient.      Sincerely,     Mary Green PA-C     [facility]  cc:   Nancy Smith MD  [address] [address]

## 2024-09-03 NOTE — PROGRESS NOTES
"  Cardiology Clinic Progress Note    Service Date: September 3, 2024    Primary Cardiologist: was Dr. Smith      Reason for Visit: afib, hfpef     HPI:   Jayashree Carson is a 74-year-old woman with past medical history significant for the followin.  Permanent atrial fibrillation with Watchman in place  2.  CKD  3.  HFpEF  4.  Moderate TR  5.  Elevated pulmonary pressures with RVSP 49 + RAP on echo   6.  COPD  7.  Wheelchair-bound due to history of CVA with left-sided weakness.    Radha is very frustrated today, she states she hates her water pill, she is incontinent and uses briefs and because of this she often has rashes.  She denies chest pain, orthopnea, shortness of breath or PND.  She is weight in her wheelchair and her weights been stable at about 226 pounds.  She expresses frustrations about them not fixing her wheelchair, brace has not been put on right and overall things not getting done.  She's not sure she needs a water pill at all.    Social History:  She lives at [facility].  She has a daughter who typically advocates for her but she is busy taking care of her granddaughter who has cancer.  Patients live there about 1 year.    Physical Exam:  /80   Pulse 71   Resp 18   Ht 1.499 m (4' 11\")   Wt 102.5 kg (226 lb)   SpO2 95%   BMI 45.65 kg/m     Elderly, frail-appearing woman in no acute distress.  Obvious left-sided weakness and contractures.  Heart is irregularly irregular, I do not appreciate murmur rub or gallop.  Lungs are clear without wheezes rales or rhonchi.  Right leg without edema, left with 1+ pitting edema to her mid shin.  Skin is warm and dry.    Data reviewed:  Last labs  show creatinine 0.87 potassium 4.4 dig level 1.1 hemoglobin 10.0    Assessment and Plan:  Permanent atrial fibrillation with Watchman device in place rates appear controlled on exam today per plan on aspirin.    2.  Heart failure with preserved ejection fraction primarily with right-sided " symptoms, patient does not feel like she needs the water pill although I suspect she clearly does.  We will switch her to a 3-day a week dosing at a higher dose this will be 80 mg on Monday Wednesday Friday to see if this at least gives her a few days off.  She is otherwise on spironolactone 25 mg daily, and had been on Jardiance although this must have been discontinued since she has been in the hospital.  Given her incontinence I do think it is reasonable as she is high risk for UTI.  Will see if we can manage her volume status with other medications.  I will not add Entresto at this time as I do not want her to develop hypotension.    3.  CKD, repeat BMP today this will help us assess diuretic use as well.    This patient should remain on a low-sodium diet.  Hopefully we can keep her weight stable with 3 times weekly diuretic dosing.  Please call if her weight goes up more than 3 pounds in a day or 5 pounds in a week will likely need to go back to either daily dosing or just more days during the week.  Will see her back in 3 months with labs before that visit.  She will otherwise get a hemoglobin, BMP, BNP today.  Thank you for allowing me to participate in this patient's care.    Mary Green PA-C  [facility] Cardiology     This note was written using Dragon voice recognition system, please excuse any misspelled names, or nonsensical words and call with any questions.      The longitudinal plan of care for the diagnosis(es)/condition(s) as documented were addressed during this visit. Due to the added complexity in care, I will continue to support Jayashree in the subsequent management and with ongoing continuity of care.    Orders this visit:  Orders Placed This Encounter   Procedures    Basic metabolic panel    Hemoglobin    TSH with free T4 reflex    N terminal pro BNP outpatient    Basic metabolic panel    N terminal pro BNP outpatient    CBC with platelets    Follow-Up with Cardiology IVON     Orders Placed  This Encounter   Medications    cetirizine (ZYRTEC) 10 MG tablet    furosemide (LASIX) 40 MG tablet     Si mg on Monday, Wednesday and Friday     Dispense:  90 tablet     Refill:  3     Medications Discontinued During This Encounter   Medication Reason    furosemide (LASIX) 40 MG tablet      Encounter Diagnosis   Name Primary?    Chronic right heart failure (H)        Current Medications:  Current Outpatient Medications   Medication Sig Dispense Refill    aspirin 81 MG EC tablet Take 81 mg by mouth daily      atorvastatin (LIPITOR) 40 MG tablet Take 40 mg by mouth daily      calcium carbonate (TUMS) 500 MG chewable tablet Take 500 mg by mouth daily      cetirizine (ZYRTEC) 10 MG tablet       digoxin (LANOXIN) 62.5 MCG tablet Take 1 tablet (62.5 mcg) by mouth daily      fluticasone (FLONASE) 50 MCG/ACT nasal spray Spray 1 spray into both nostrils daily      furosemide (LASIX) 40 MG tablet 80 mg on Monday, Wednesday and Friday 90 tablet 3    gabapentin (NEURONTIN) 100 MG capsule Take 2 capsules (200 mg) by mouth 3 times daily      metoprolol tartrate (LOPRESSOR) 25 MG tablet Take 1 tablet (25 mg) by mouth 2 times daily      multivitamin w/minerals (THERA-VIT-M) tablet Take 1 tablet by mouth daily      predniSONE (DELTASONE) 5 MG tablet Take 5 mg by mouth daily      spironolactone (ALDACTONE) 25 MG tablet Take 25 mg by mouth daily      traMADol (ULTRAM) 50 MG tablet Take 1 tablet (50 mg) by mouth every 8 hours as needed for severe pain 20 tablet 0    acetaminophen (TYLENOL) 500 MG tablet Take 2 tablets (1,000 mg) by mouth 3 times daily      albuterol (PROAIR HFA/PROVENTIL HFA/VENTOLIN HFA) 108 (90 Base) MCG/ACT inhaler Inhale 2 puffs into the lungs every 6 hours as needed for shortness of breath, wheezing or cough      albuterol (PROVENTIL) (2.5 MG/3ML) 0.083% neb solution Take 2.5 mg by nebulization every 6 hours as needed for shortness of breath, wheezing or cough         Allergies Reviewed and  Updated:  Allergies   Allergen Reactions    Oxycodone Confusion     Patient had confusion, paranoid, aggression following administration.  Per family this has happened with narcotics in the past as well (details unknown)    Blood-Group Specific Substance Unknown     Patient has a non-specific antibody.  Blood Product orders may be delayed.  Draw one red top and two purple top tubes for ALL Type and Screen/ Type and Crossmatch orders.    Iodinated Contrast Media      RASH    Lisinopril Unknown     Taken off per cardiology    Sulfamethoxazole-Trimethoprim      STOMACH CRAMPS       Review of Systems:  Skin:        Eyes:       ENT:       Respiratory:  Positive for dyspnea on exertion  Cardiovascular:  Negative for    Gastroenterology:      Genitourinary:       Musculoskeletal:       Neurologic:       Psychiatric:       Heme/Lymph/Imm:       Endocrine:          Pertinent Past Medical, Surgical and Family History Reviewed and noted above.     CC  Nancy Smith MD  [id number] [address]

## 2024-09-10 ENCOUNTER — LAB REQUISITION (OUTPATIENT)
Dept: LAB | Facility: CLINIC | Age: 74
End: 2024-09-10
Payer: COMMERCIAL

## 2024-09-10 DIAGNOSIS — I13.0 HYPERTENSIVE HEART AND CHRONIC KIDNEY DISEASE WITH HEART FAILURE AND STAGE 1 THROUGH STAGE 4 CHRONIC KIDNEY DISEASE, OR UNSPECIFIED CHRONIC KIDNEY DISEASE (H): ICD-10-CM

## 2024-09-11 LAB
ANION GAP SERPL CALCULATED.3IONS-SCNC: 12 MMOL/L (ref 7–15)
BUN SERPL-MCNC: 49.2 MG/DL (ref 8–23)
CALCIUM SERPL-MCNC: 10 MG/DL (ref 8.8–10.4)
CHLORIDE SERPL-SCNC: 106 MMOL/L (ref 98–107)
CREAT SERPL-MCNC: 1.29 MG/DL (ref 0.51–0.95)
EGFRCR SERPLBLD CKD-EPI 2021: 43 ML/MIN/1.73M2
GLUCOSE SERPL-MCNC: 82 MG/DL (ref 70–99)
HCO3 SERPL-SCNC: 24 MMOL/L (ref 22–29)
POTASSIUM SERPL-SCNC: 4.9 MMOL/L (ref 3.4–5.3)
SODIUM SERPL-SCNC: 142 MMOL/L (ref 135–145)

## 2024-09-11 PROCEDURE — 36415 COLL VENOUS BLD VENIPUNCTURE: CPT | Mod: ORL | Performed by: FAMILY MEDICINE

## 2024-09-11 PROCEDURE — 80048 BASIC METABOLIC PNL TOTAL CA: CPT | Mod: ORL | Performed by: FAMILY MEDICINE

## 2024-09-11 PROCEDURE — P9603 ONE-WAY ALLOW PRORATED MILES: HCPCS | Mod: ORL | Performed by: FAMILY MEDICINE

## 2024-09-13 ENCOUNTER — TELEPHONE (OUTPATIENT)
Dept: CARDIOLOGY | Facility: CLINIC | Age: 74
End: 2024-09-13
Payer: COMMERCIAL

## 2024-09-13 NOTE — TELEPHONE ENCOUNTER
[facility] Call Center    Phone Message    May a detailed message be left on voicemail: yes     Reason for Call: Other: pts daughter would like lab orders sent to [facility]. There phone number is [phone]. Please reach out to situate getting lab orders to them      Action Taken: Other: cardiology     Travel Screening: Not Applicable      Thank you!  [facility]        Date of Service:
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
## 2024-09-16 NOTE — TELEPHONE ENCOUNTER
Lab orders were faxed to [facility] at [phone]. Noted that labs were to be done prior to visit on 12/10/24.     Arabella Boogie on 9/16/2024 at 8:42 AM

## 2024-12-08 ENCOUNTER — LAB REQUISITION (OUTPATIENT)
Dept: LAB | Facility: CLINIC | Age: 74
End: 2024-12-08
Payer: COMMERCIAL

## 2024-12-08 DIAGNOSIS — I50.812 CHRONIC RIGHT HEART FAILURE (H): ICD-10-CM

## 2024-12-09 ENCOUNTER — LAB REQUISITION (OUTPATIENT)
Dept: LAB | Facility: CLINIC | Age: 74
End: 2024-12-09
Payer: COMMERCIAL

## 2024-12-09 DIAGNOSIS — I50.812 CHRONIC RIGHT HEART FAILURE (H): ICD-10-CM

## 2024-12-09 LAB
ANION GAP SERPL CALCULATED.3IONS-SCNC: 11 MMOL/L (ref 7–15)
BUN SERPL-MCNC: 32.9 MG/DL (ref 8–23)
CALCIUM SERPL-MCNC: 9.2 MG/DL (ref 8.8–10.4)
CHLORIDE SERPL-SCNC: 108 MMOL/L (ref 98–107)
CREAT SERPL-MCNC: 1.09 MG/DL (ref 0.51–0.95)
EGFRCR SERPLBLD CKD-EPI 2021: 52 ML/MIN/1.73M2
ERYTHROCYTE [DISTWIDTH] IN BLOOD BY AUTOMATED COUNT: 13.8 % (ref 10–15)
GLUCOSE SERPL-MCNC: 77 MG/DL (ref 70–99)
HCO3 SERPL-SCNC: 22 MMOL/L (ref 22–29)
HCT VFR BLD AUTO: 36 % (ref 35–47)
HGB BLD-MCNC: 11.3 G/DL (ref 11.7–15.7)
MCH RBC QN AUTO: 32.5 PG (ref 26.5–33)
MCHC RBC AUTO-ENTMCNC: 31.4 G/DL (ref 31.5–36.5)
MCV RBC AUTO: 103 FL (ref 78–100)
NT-PROBNP SERPL-MCNC: 1565 PG/ML (ref 0–1800)
PLATELET # BLD AUTO: 251 10E3/UL (ref 150–450)
POTASSIUM SERPL-SCNC: 4.4 MMOL/L (ref 3.4–5.3)
RBC # BLD AUTO: 3.48 10E6/UL (ref 3.8–5.2)
SODIUM SERPL-SCNC: 141 MMOL/L (ref 135–145)
WBC # BLD AUTO: 10.2 10E3/UL (ref 4–11)

## 2024-12-09 PROCEDURE — 36415 COLL VENOUS BLD VENIPUNCTURE: CPT | Mod: ORL | Performed by: FAMILY MEDICINE

## 2024-12-09 PROCEDURE — 80048 BASIC METABOLIC PNL TOTAL CA: CPT | Mod: ORL | Performed by: FAMILY MEDICINE

## 2024-12-09 PROCEDURE — 83880 ASSAY OF NATRIURETIC PEPTIDE: CPT | Mod: ORL | Performed by: FAMILY MEDICINE

## 2024-12-09 PROCEDURE — P9603 ONE-WAY ALLOW PRORATED MILES: HCPCS | Mod: ORL | Performed by: FAMILY MEDICINE

## 2024-12-09 PROCEDURE — 85027 COMPLETE CBC AUTOMATED: CPT | Mod: ORL | Performed by: FAMILY MEDICINE

## 2024-12-10 ENCOUNTER — OFFICE VISIT (OUTPATIENT)
Dept: CARDIOLOGY | Facility: CLINIC | Age: 74
End: 2024-12-10
Payer: COMMERCIAL

## 2024-12-10 VITALS
WEIGHT: 247 LBS | OXYGEN SATURATION: 97 % | DIASTOLIC BLOOD PRESSURE: 62 MMHG | BODY MASS INDEX: 49.89 KG/M2 | SYSTOLIC BLOOD PRESSURE: 108 MMHG | TEMPERATURE: 97.2 F | HEART RATE: 76 BPM

## 2024-12-10 DIAGNOSIS — I48.91 ATRIAL FIBRILLATION WITH RVR (H): Primary | ICD-10-CM

## 2024-12-10 DIAGNOSIS — I50.812 CHRONIC RIGHT HEART FAILURE (H): ICD-10-CM

## 2024-12-10 ASSESSMENT — PAIN SCALES - GENERAL: PAINLEVEL_OUTOF10: MODERATE PAIN (4)

## 2024-12-10 NOTE — PROGRESS NOTES
Cardiology Clinic Progress Note    Service Date: 12/10/2024     Primary Cardiologist: was Dr. Smith      Reason for Visit: afib, hfpef     HPI:   Jayashree Carson is a 74-year-old woman with past medical history significant for the followin.  Permanent atrial fibrillation with Watchman in place  2.  CKD  3.  HFpEF  4.  Moderate TR  5.  Elevated pulmonary pressures with RVSP 49 + RAP on echo   6.  COPD  7.  Wheelchair-bound due to history of CVA with left-sided weakness.    Natasha comes back to clinic today and is doing somewhat better.  We switched her diuretic to 3 times a week at a higher dose so she can have some days off and she still states the days on the diuretic are horrible but the days off are somewhat better.  She comes in today with her daughter Morelia who is a great advocate for her and feels like things are relatively stable.  She denies increased shortness of breath orthopnea, PND, syncope or presyncope.  She is not walking much at baseline as the aids were supposed to walk her daily rarely do.  We did get weights today weight at the end of October was about 240 pounds it is trended up some but more recently has been between 242 and 246 pounds.  We have not gotten any calls for that her weight gain.    Social History:  She lives at [facility].  Her daughter Morelia comes in today.  Notes there is been a lot of turnover at her facility.  Patient's granddaughter also has cancer who her daughter is supporting as well.  Patient has lived there about 1 year.    Physical Exam:  /62 (BP Location: Right arm, Patient Position: Sitting, Cuff Size: Adult Large)   Pulse 76   Temp 97.2  F (36.2  C) (Temporal)   Wt 112 kg (247 lb)   SpO2 97%   BMI 49.89 kg/m     Elderly, frail-appearing woman in no acute distress.  Obvious left-sided weakness and contractures.  Heart is irregularly irregular, I do not appreciate murmur rub or gallop.  Lungs are clear without wheezes rales or rhonchi.   Right leg without edema, left with 1+ pitting edema to her mid shin.  Skin is warm and dry.  Stable exam from previous.    Data reviewed:  BMP, hemoglobin, BNP.  EKG today shows A-fib heart rate 74, T wave flattening in inferior leads.    Assessment and Plan:  Permanent atrial fibrillation with Watchman device in place, rates controlled on EKG today.  She is appropriately on aspirin.      2.  Heart failure with preserved ejection fraction primarily with right-sided symptoms, have trended up slightly since we switched her to 3 times weekly dosing of Lasix at 80 mg daily.  That being said her lungs are perfectly clear and she has no edema or other signs of heart failure.  At this point we will continue to follow weights.  I have asked to be called if weight increases by 3 pounds overnight or 5 pounds in a week or if her weight hits 250 pounds.  If her weight does hit 250 pounds my orders are to stop Lasix and switch to torsemide 60 mg daily which would be an increased dose.  This is, the first time her BNP has been normal since we have been checking it and that is quite reassuring.    3.  CKD, with creatinine slightly elevated at 1.1 but stable.    4.  Mild anemia at 11.3 [facility].    I am very pleased to see this patient doing well.  Please do not hesitate to call as either her facility or her daughter if there is additional concerns.  I will see her back in 3 months with a BMP and BNP at that visit, sooner with concerns.    Mary Green PA-C  [facility]     This note was written using Dragon voice recognition system, please excuse any misspelled names, or nonsensical words and call with any questions.      The longitudinal plan of care for the diagnosis(es)/condition(s) as documented were addressed during this visit. Due to the added complexity in care, I will continue to support Jayashree in the subsequent management and with ongoing continuity of care.    Orders  this visit:  Orders Placed This Encounter   Procedures    Basic metabolic panel    Follow-Up with Cardiology IVON    EKG 12-lead complete w/read - Clinics     Orders Placed This Encounter   Medications    simethicone 80 MG TABS     Sig: Take 1 tablet by mouth every 6 hours.     There are no discontinued medications.    Encounter Diagnoses   Name Primary?    Chronic right heart failure (H)     Atrial fibrillation with RVR (H) Yes         Current Medications:  Current Outpatient Medications   Medication Sig Dispense Refill    acetaminophen (TYLENOL) 500 MG tablet Take 2 tablets (1,000 mg) by mouth 3 times daily      albuterol (PROAIR HFA/PROVENTIL HFA/VENTOLIN HFA) 108 (90 Base) MCG/ACT inhaler Inhale 2 puffs into the lungs every 6 hours as needed for shortness of breath, wheezing or cough      albuterol (PROVENTIL) (2.5 MG/3ML) 0.083% neb solution Take 2.5 mg by nebulization every 6 hours as needed for shortness of breath, wheezing or cough      aspirin 81 MG EC tablet Take 81 mg by mouth daily      atorvastatin (LIPITOR) 40 MG tablet Take 40 mg by mouth daily      calcium carbonate (TUMS) 500 MG chewable tablet Take 500 mg by mouth daily      cetirizine (ZYRTEC) 10 MG tablet       digoxin (LANOXIN) 62.5 MCG tablet Take 1 tablet (62.5 mcg) by mouth daily      fluticasone (FLONASE) 50 MCG/ACT nasal spray Spray 1 spray into both nostrils daily      furosemide (LASIX) 40 MG tablet 80 mg on Monday, Wednesday and Friday 90 tablet 3    gabapentin (NEURONTIN) 100 MG capsule Take 2 capsules (200 mg) by mouth 3 times daily      metoprolol tartrate (LOPRESSOR) 25 MG tablet Take 1 tablet (25 mg) by mouth 2 times daily      multivitamin w/minerals (THERA-VIT-M) tablet Take 1 tablet by mouth daily      predniSONE (DELTASONE) 5 MG tablet Take 5 mg by mouth daily      simethicone 80 MG TABS Take 1 tablet by mouth every 6 hours.      spironolactone (ALDACTONE) 25 MG tablet Take 25 mg by mouth daily      traMADol (ULTRAM) 50 MG tablet  Take 1 tablet (50 mg) by mouth every 8 hours as needed for severe pain 20 tablet 0       Allergies Reviewed and Updated:  Allergies   Allergen Reactions    Oxycodone Confusion     Patient had confusion, paranoid, aggression following administration.  Per family this has happened with narcotics in the past as well (details unknown)    Blood-Group Specific Substance Unknown     Patient has a non-specific antibody.  Blood Product orders may be delayed.  Draw one red top and two purple top tubes for ALL Type and Screen/ Type and Crossmatch orders.    Iodinated Contrast Media      RASH    Lisinopril Unknown     Taken off per cardiology    Sulfamethoxazole-Trimethoprim      STOMACH CRAMPS       Review of Systems:  Skin:        Eyes:       ENT:       Respiratory:    cough, wheezing  Cardiovascular:  lightheadedness, dizziness    Gastroenterology:      Genitourinary:       Musculoskeletal:       Neurologic:    numbness or tingling of hands  Psychiatric:       Heme/Lymph/Imm:       Endocrine:          Pertinent Past Medical, Surgical and Family History Reviewed and noted above.     CC  Nancy Smith MD  [address] [facility] [address] [place] [id number]

## 2024-12-10 NOTE — LETTER
12/10/2024    [facility]  [address] [address] [facility] [address]    RE: Jayashree Carson       Dear Colleague,     I had the pleasure of seeing Jayashree Carson in the [facility].    Cardiology Clinic Progress Note    Service Date: 12/10/2024     Primary Cardiologist: was Dr. Smith      Reason for Visit: afib, hfpef     HPI:   Jayashree Carson is a 74-year-old woman with past medical history significant for the followin.  Permanent atrial fibrillation with Watchman in place  2.  CKD  3.  HFpEF  4.  Moderate TR  5.  Elevated pulmonary pressures with RVSP 49 + RAP on echo   6.  COPD  7.  Wheelchair-bound due to history of CVA with left-sided weakness.    Natasha comes back to clinic today and is doing somewhat better.  We switched her diuretic to 3 times a week at a higher dose so she can have some days off and she still states the days on the diuretic are horrible but the days off are somewhat better.  She comes in today with her daughter Morelia who is a great advocate for her and feels like things are relatively stable.  She denies increased shortness of breath orthopnea, PND, syncope or presyncope.  She is not walking much at baseline as the aids were supposed to walk her daily rarely do.  We did get weights today weight at the end of October was about 240 pounds it is trended up some but more recently has been between 242 and 246 pounds.  We have not gotten any calls for that her weight gain.    Social History:  She lives at [facility].  Her daughter Morelia comes in today.  Notes there is been a lot of turnover at her facility.  Patient's granddaughter also has cancer who her daughter is supporting as well.  Patient has lived there about 1 year.    Physical Exam:  /62 (BP Location: Right arm, Patient Position: Sitting, Cuff Size: Adult Large)   Pulse 76   Temp 97.2  F (36.2  C) (Temporal)   Wt 112 kg (247 lb)   SpO2 97%   BMI 49.89  kg/m     Elderly, frail-appearing woman in no acute distress.  Obvious left-sided weakness and contractures.  Heart is irregularly irregular, I do not appreciate murmur rub or gallop.  Lungs are clear without wheezes rales or rhonchi.  Right leg without edema, left with 1+ pitting edema to her mid shin.  Skin is warm and dry.  Stable exam from previous.    Data reviewed:  BMP, hemoglobin, BNP.  EKG today shows A-fib heart rate 74, T wave flattening in inferior leads.    Assessment and Plan:  Permanent atrial fibrillation with Watchman device in place, rates controlled on EKG today.  She is appropriately on aspirin.      2.  Heart failure with preserved ejection fraction primarily with right-sided symptoms, have trended up slightly since we switched her to 3 times weekly dosing of Lasix at 80 mg daily.  That being said her lungs are perfectly clear and she has no edema or other signs of heart failure.  At this point we will continue to follow weights.  I have asked to be called if weight increases by 3 pounds overnight or 5 pounds in a week or if her weight hits 250 pounds.  If her weight does hit 250 pounds my orders are to stop Lasix and switch to torsemide 60 mg daily which would be an increased dose.  This is, the first time her BNP has been normal since we have been checking it and that is quite reassuring.    3.  CKD, with creatinine slightly elevated at 1.1 but stable.    4.  Mild anemia at 11.3 recommend they trend this at [facility].    I am very pleased to see this patient doing well.  Please do not hesitate to call as either her facility or her daughter if there is additional concerns.  I will see her back in 3 months with a BMP and BNP at that visit, sooner with concerns.    MARION Rossi [facility] Cardiology     This note was written using Dragon voice recognition system, please excuse any misspelled names, or nonsensical words and call with any questions.      The longitudinal plan of  care for the diagnosis(es)/condition(s) as documented were addressed during this visit. Due to the added complexity in care, I will continue to support Jayashree in the subsequent management and with ongoing continuity of care.    Orders this visit:  Orders Placed This Encounter   Procedures     Basic metabolic panel     Follow-Up with Cardiology IVON     EKG 12-lead complete w/read - Clinics     Orders Placed This Encounter   Medications     simethicone 80 MG TABS     Sig: Take 1 tablet by mouth every 6 hours.     There are no discontinued medications.    Encounter Diagnoses   Name Primary?     Chronic right heart failure (H)      Atrial fibrillation with RVR (H) Yes         Current Medications:  Current Outpatient Medications   Medication Sig Dispense Refill     acetaminophen (TYLENOL) 500 MG tablet Take 2 tablets (1,000 mg) by mouth 3 times daily       albuterol (PROAIR HFA/PROVENTIL HFA/VENTOLIN HFA) 108 (90 Base) MCG/ACT inhaler Inhale 2 puffs into the lungs every 6 hours as needed for shortness of breath, wheezing or cough       albuterol (PROVENTIL) (2.5 MG/3ML) 0.083% neb solution Take 2.5 mg by nebulization every 6 hours as needed for shortness of breath, wheezing or cough       aspirin 81 MG EC tablet Take 81 mg by mouth daily       atorvastatin (LIPITOR) 40 MG tablet Take 40 mg by mouth daily       calcium carbonate (TUMS) 500 MG chewable tablet Take 500 mg by mouth daily       cetirizine (ZYRTEC) 10 MG tablet        digoxin (LANOXIN) 62.5 MCG tablet Take 1 tablet (62.5 mcg) by mouth daily       fluticasone (FLONASE) 50 MCG/ACT nasal spray Spray 1 spray into both nostrils daily       furosemide (LASIX) 40 MG tablet 80 mg on Monday, Wednesday and Friday 90 tablet 3     gabapentin (NEURONTIN) 100 MG capsule Take 2 capsules (200 mg) by mouth 3 times daily       metoprolol tartrate (LOPRESSOR) 25 MG tablet Take 1 tablet (25 mg) by mouth 2 times daily       multivitamin w/minerals (THERA-VIT-M) tablet Take 1  tablet by mouth daily       predniSONE (DELTASONE) 5 MG tablet Take 5 mg by mouth daily       simethicone 80 MG TABS Take 1 tablet by mouth every 6 hours.       spironolactone (ALDACTONE) 25 MG tablet Take 25 mg by mouth daily       traMADol (ULTRAM) 50 MG tablet Take 1 tablet (50 mg) by mouth every 8 hours as needed for severe pain 20 tablet 0       Allergies Reviewed and Updated:  Allergies   Allergen Reactions     Oxycodone Confusion     Patient had confusion, paranoid, aggression following administration.  Per family this has happened with narcotics in the past as well (details unknown)     Blood-Group Specific Substance Unknown     Patient has a non-specific antibody.  Blood Product orders may be delayed.  Draw one red top and two purple top tubes for ALL Type and Screen/ Type and Crossmatch orders.     Iodinated Contrast Media      RASH     Lisinopril Unknown     Taken off per cardiology     Sulfamethoxazole-Trimethoprim      STOMACH CRAMPS       Review of Systems:  Skin:        Eyes:       ENT:       Respiratory:    cough, wheezing  Cardiovascular:  lightheadedness, dizziness    Gastroenterology:      Genitourinary:       Musculoskeletal:       Neurologic:    numbness or tingling of hands  Psychiatric:       Heme/Lymph/Imm:       Endocrine:          Pertinent Past Medical, Surgical and Family History Reviewed and noted above.     CC  Nancy Smith MD  [address] [facility] [address] [place] [id number]      Thank you for allowing me to participate in the care of your patient.      Sincerely,     Mary Green PA-C     [facility]  cc:   Xavier Rachel MD  No address on file

## 2024-12-31 ENCOUNTER — TELEPHONE (OUTPATIENT)
Dept: ADMISSION | Facility: CLINIC | Age: 74
End: 2024-12-31
Payer: COMMERCIAL

## 2024-12-31 DIAGNOSIS — I50.812 CHRONIC RIGHT HEART FAILURE (H): Primary | ICD-10-CM

## 2024-12-31 NOTE — TELEPHONE ENCOUNTER
Ivy Shoemaker, Pat Foster, RN    Have her take lasix 80mg daily for the next two weeks and then get her in for appt with BMP    [facility] relayed orders as above.     Will send AMBER Rossi an update for her to review when back in office Will work on follow up visit. Nursing home can draw lab ([facility]) in weeks.

## 2024-12-31 NOTE — TELEPHONE ENCOUNTER
Reason for Call:  Other Weight gain overnight    Detailed comments: Ana M with [facility] in [place] called to notify her cardiologist that she has had a 5 lb weight increase overnight. Typically she is around 240-241. This morning she was 245.  She has had 1-2 lb weight gains on other occasions overnight previously.    Phone Number Patient can be reached at: Other phone number:  Ana M [phone]    Best Time: anytime    Can we leave a detailed message on this number? YES    Call taken on 12/31/2024 at 11:00 AM by Joanne Gipson

## 2024-12-31 NOTE — TELEPHONE ENCOUNTER
Ana M with [facility] in [place] called to notify AMBER Rossi that patient had a 5 lb weight increase overnight. Typically patient's weight is around 240-241lbs. This morning 12/31/24 patient's weight was 245lbs. She has had 1-2 lb weight gains on other occasions overnight previously per [facility] staff. No new symptoms, no increased of swelling, or shortness of breath. Patient likes to snack on her own in her room and staff said that patient has an open bag sun bag of chips in her room now.     Patient currently taking lasix 80mg MWF and spironolactone 25mg daily.    Component      Latest Ref Rng 9/11/2024  5:43 AM 12/9/2024  6:49 AM   Sodium      135 - 145 mmol/L 142  141    Potassium      3.4 - 5.3 mmol/L 4.9  4.4    Chloride      98 - 107 mmol/L 106  108 (H)    Carbon Dioxide (CO2)      22 - 29 mmol/L 24  22    Anion Gap      7 - 15 mmol/L 12  11    Urea Nitrogen      8.0 - 23.0 mg/dL 49.2 (H)  32.9 (H)    Creatinine      0.51 - 0.95 mg/dL 1.29 (H)  1.09 (H)    GFR Estimate      >60 mL/min/1.73m2 43 (L)  52 (L)    Calcium      8.8 - 10.4 mg/dL 10.0  9.2      AMBER Rossi is currently out of the office until 1/7/25 and was seen by Dr. Luis person who no longer practices within [facility]. Will route to covering provider for further recommendations.     From AMBER Rossi OV note from 12/10/24:  Heart failure with preserved ejection fraction primarily with right-sided symptoms, have trended up slightly since we switched her to 3 times weekly dosing of Lasix at 80 mg daily. That being said her lungs are perfectly clear and she has no edema or other signs of heart failure. At this point we will continue to follow weights. I have asked to be called if weight increases by 3 pounds overnight or 5 pounds in a week or if her weight hits 250 pounds. If her weight does hit 250 pounds my orders are to stop Lasix and switch to torsemide 60 mg daily which would be an increased dose. This is,  the first time her BNP has been normal since we have been checking it and that is quite reassuring.     Patient's next cardiology visit is with AMBER Rossi on 4/17/25

## 2025-01-03 ENCOUNTER — LAB REQUISITION (OUTPATIENT)
Dept: LAB | Facility: CLINIC | Age: 75
End: 2025-01-03
Payer: COMMERCIAL

## 2025-01-03 DIAGNOSIS — I50.9 HEART FAILURE, UNSPECIFIED (H): ICD-10-CM

## 2025-01-06 LAB
ANION GAP SERPL CALCULATED.3IONS-SCNC: 13 MMOL/L (ref 7–15)
BUN SERPL-MCNC: 42.1 MG/DL (ref 8–23)
CALCIUM SERPL-MCNC: 10.2 MG/DL (ref 8.8–10.4)
CHLORIDE SERPL-SCNC: 101 MMOL/L (ref 98–107)
CREAT SERPL-MCNC: 1.4 MG/DL (ref 0.51–0.95)
EGFRCR SERPLBLD CKD-EPI 2021: 39 ML/MIN/1.73M2
GLUCOSE SERPL-MCNC: 81 MG/DL (ref 70–99)
HCO3 SERPL-SCNC: 27 MMOL/L (ref 22–29)
MAGNESIUM SERPL-MCNC: 2.1 MG/DL (ref 1.7–2.3)
POTASSIUM SERPL-SCNC: 4.4 MMOL/L (ref 3.4–5.3)
SODIUM SERPL-SCNC: 141 MMOL/L (ref 135–145)

## 2025-01-06 PROCEDURE — 80048 BASIC METABOLIC PNL TOTAL CA: CPT | Mod: ORL | Performed by: FAMILY MEDICINE

## 2025-01-06 PROCEDURE — P9603 ONE-WAY ALLOW PRORATED MILES: HCPCS | Mod: ORL | Performed by: FAMILY MEDICINE

## 2025-01-06 PROCEDURE — 83735 ASSAY OF MAGNESIUM: CPT | Mod: ORL | Performed by: FAMILY MEDICINE

## 2025-01-06 PROCEDURE — 36415 COLL VENOUS BLD VENIPUNCTURE: CPT | Mod: ORL | Performed by: FAMILY MEDICINE

## 2025-01-07 ENCOUNTER — LAB REQUISITION (OUTPATIENT)
Dept: LAB | Facility: CLINIC | Age: 75
End: 2025-01-07
Payer: COMMERCIAL

## 2025-01-07 DIAGNOSIS — I50.812 CHRONIC RIGHT HEART FAILURE (H): ICD-10-CM

## 2025-01-07 NOTE — TELEPHONE ENCOUNTER
Update appreciated.   Glad to see wts are down, will await labs before making changes.    Mary Green PA-C 1/7/2025 9:53 AM

## 2025-01-07 NOTE — TELEPHONE ENCOUNTER
BMP and BNP lab orders placed. Labs could be drawn tomorrow Wednesday 1/8/25 as the facility draws labs on M, W, F.   Will fax lab orders and AMBER Rossi recommendations to [facility].   Fax # for [facility] [phone]    Weights in lbs:  12/31/24 - 245 (day when assisted living called, increased lasix to 80mg daily)   1/1/25 - 244  1/2/25 - 243  1/3/25 - 243  1/4/25 - 241 1/5/25 - 242 1/6/25 - 241 1/7/25 -242    Sending update to AMBER Rossi regarding patient's weights.

## 2025-01-07 NOTE — TELEPHONE ENCOUNTER
I think it's reasonable to manage her over the phone for the moment as I know transportation is very difficult for them.  Please have them draw a bmp and bnp today or as soon as they are able this week and we'll adjust.  Please also get update on current wt.    Mary Green PA-C 1/7/2025 9:18 AM

## 2025-01-08 LAB
ANION GAP SERPL CALCULATED.3IONS-SCNC: 14 MMOL/L (ref 7–15)
BUN SERPL-MCNC: 52.6 MG/DL (ref 8–23)
CALCIUM SERPL-MCNC: 10.2 MG/DL (ref 8.8–10.4)
CHLORIDE SERPL-SCNC: 100 MMOL/L (ref 98–107)
CREAT SERPL-MCNC: 1.54 MG/DL (ref 0.51–0.95)
EGFRCR SERPLBLD CKD-EPI 2021: 35 ML/MIN/1.73M2
GLUCOSE SERPL-MCNC: 87 MG/DL (ref 70–99)
HCO3 SERPL-SCNC: 27 MMOL/L (ref 22–29)
NT-PROBNP SERPL-MCNC: 1533 PG/ML (ref 0–1800)
POTASSIUM SERPL-SCNC: 4.2 MMOL/L (ref 3.4–5.3)
SODIUM SERPL-SCNC: 141 MMOL/L (ref 135–145)

## 2025-01-08 PROCEDURE — P9603 ONE-WAY ALLOW PRORATED MILES: HCPCS | Mod: ORL | Performed by: FAMILY MEDICINE

## 2025-01-08 PROCEDURE — 83880 ASSAY OF NATRIURETIC PEPTIDE: CPT | Mod: ORL | Performed by: FAMILY MEDICINE

## 2025-01-08 PROCEDURE — 36415 COLL VENOUS BLD VENIPUNCTURE: CPT | Mod: ORL | Performed by: FAMILY MEDICINE

## 2025-01-08 PROCEDURE — 80048 BASIC METABOLIC PNL TOTAL CA: CPT | Mod: ORL | Performed by: FAMILY MEDICINE

## 2025-01-08 NOTE — TELEPHONE ENCOUNTER
Labs completed today. Routing to AMBER Rossi to review.       Component      Latest Ref Rng 12/9/2024  6:49 AM 1/6/2025  9:28 AM 1/8/2025  7:30 AM   Sodium      135 - 145 mmol/L 141  141  141    Potassium      3.4 - 5.3 mmol/L 4.4  4.4  4.2    Chloride      98 - 107 mmol/L 108 (H)  101  100    Carbon Dioxide (CO2)      22 - 29 mmol/L 22  27  27    Anion Gap      7 - 15 mmol/L 11  13  14    Urea Nitrogen      8.0 - 23.0 mg/dL 32.9 (H)  42.1 (H)  52.6 (H)    Creatinine      0.51 - 0.95 mg/dL 1.09 (H)  1.40 (H)  1.54 (H)    GFR Estimate      >60 mL/min/1.73m2 52 (L)  39 (L)  35 (L)    Calcium      8.8 - 10.4 mg/dL 9.2  10.2  10.2    N-Terminal Pro Bnp      0 - 1,800 pg/mL   1,533    Glucose      70 - 99 mg/dL   87

## 2025-01-08 NOTE — TELEPHONE ENCOUNTER
Cr up significantly suggesting some dehydration.   Recommend going back to furosemide 80 mg three times a week on Mon, Wed and Fri.  Repeat bmp in 2 wks.    Let's set up sliding scale orders:  Wt less than 245 continue lasix 80 mg three times a week.    Wt >245 give lasix 80 mg the following 2 days regardless of what day it is, then go back to NAT CLANCY F. Sara More, PA-C 1/8/2025 4:25 PM

## 2025-01-08 NOTE — TELEPHONE ENCOUNTER
Faxed AMBER Rossi orders to [facility] [phone]. BMP order placed to be drawn in 2 weeks.

## 2025-01-09 NOTE — TELEPHONE ENCOUNTER
Myriam with [facility] is calling.  She does not understand the instructions faxed at all.  She questioned everything and what if this falls on this day, then what, etc, etc, etc.  It would be best to speak with her directly    Thank you,    Her number is: [phone]    Joanne Gipson

## 2025-01-09 NOTE — TELEPHONE ENCOUNTER
Let's just stick with the baseline 80 mg three times a week.    If they call you can then use the standing orders.  Does that work?  Mary Green PA-C 1/9/2025 10:52 AM

## 2025-01-09 NOTE — TELEPHONE ENCOUNTER
Yes, that will work. Called Myriam back to relay AMBER Rossi recommendations. Nursing home will call cardiology for further instructions. If patient's weight is greater than 245#, then cardiology nursing staff will go by AMBER Rossi sliding scale.     Sliding Scale:  Wt less than 245 continue lasix 80 mg three times a week.    Wt >245 give lasix 80 mg the following 2 days regardless of what day it is, then go back to M W, F.

## 2025-01-09 NOTE — TELEPHONE ENCOUNTER
Returned Myriam's call. She is worried that the way the sliding scale order reads that leaves room for error. Will route back to AMBER Rossi to see if there is another way to re write or change sliding scale.

## 2025-01-21 ENCOUNTER — LAB REQUISITION (OUTPATIENT)
Dept: LAB | Facility: CLINIC | Age: 75
End: 2025-01-21
Payer: COMMERCIAL

## 2025-01-21 DIAGNOSIS — I50.9 HEART FAILURE, UNSPECIFIED (H): ICD-10-CM

## 2025-01-21 DIAGNOSIS — I50.812 CHRONIC RIGHT HEART FAILURE (H): ICD-10-CM

## 2025-01-22 ENCOUNTER — TRANSFERRED RECORDS (OUTPATIENT)
Dept: HEALTH INFORMATION MANAGEMENT | Facility: CLINIC | Age: 75
End: 2025-01-22

## 2025-01-22 LAB
ANION GAP SERPL CALCULATED.3IONS-SCNC: 13 MMOL/L (ref 7–15)
BUN SERPL-MCNC: 30.3 MG/DL (ref 8–23)
CALCIUM SERPL-MCNC: 9.9 MG/DL (ref 8.8–10.4)
CHLORIDE SERPL-SCNC: 106 MMOL/L (ref 98–107)
CREAT SERPL-MCNC: 1.32 MG/DL (ref 0.51–0.95)
EGFRCR SERPLBLD CKD-EPI 2021: 42 ML/MIN/1.73M2
GLUCOSE SERPL-MCNC: 78 MG/DL (ref 70–99)
HCO3 SERPL-SCNC: 24 MMOL/L (ref 22–29)
NT-PROBNP SERPL-MCNC: 2412 PG/ML (ref 0–1800)
POTASSIUM SERPL-SCNC: 4.4 MMOL/L (ref 3.4–5.3)
SODIUM SERPL-SCNC: 143 MMOL/L (ref 135–145)

## 2025-01-22 PROCEDURE — 80048 BASIC METABOLIC PNL TOTAL CA: CPT | Mod: ORL | Performed by: FAMILY MEDICINE

## 2025-01-22 PROCEDURE — P9604 ONE-WAY ALLOW PRORATED TRIP: HCPCS | Mod: ORL | Performed by: FAMILY MEDICINE

## 2025-01-22 PROCEDURE — 83880 ASSAY OF NATRIURETIC PEPTIDE: CPT | Mod: ORL | Performed by: FAMILY MEDICINE

## 2025-01-22 PROCEDURE — 36415 COLL VENOUS BLD VENIPUNCTURE: CPT | Mod: ORL | Performed by: FAMILY MEDICINE

## 2025-01-27 ENCOUNTER — PATIENT OUTREACH (OUTPATIENT)
Dept: CARDIOLOGY | Facility: CLINIC | Age: 75
End: 2025-01-27
Payer: COMMERCIAL

## 2025-01-27 ENCOUNTER — TELEPHONE (OUTPATIENT)
Dept: ADMISSION | Facility: CLINIC | Age: 75
End: 2025-01-27
Payer: COMMERCIAL

## 2025-01-27 NOTE — TELEPHONE ENCOUNTER
Reason for Call:  Other call back    Detailed comments: [facility] RN Myriam calling to report fluctuation in her weight and need to increase dosage of medicine. Please call for clarification     Phone Number Patient can be reached at: [phone]     Best Time: any    Can we leave a detailed message on this number? YES    Call taken on 1/27/2025 at 10:38 AM by Mindy Gan

## 2025-01-27 NOTE — TELEPHONE ENCOUNTER
Spoke with Myriam who said patient's weight is 249# today (Monday 1/27/25)   Weakness in legs, but no swelling   No increased in shortness of breath above patients baseline.     Per AMBER Rossi Sliding Scale patient is to get an extra dose of lasix tomorrow Tuesday 80mg.     Myriam verbalized understanding. She will notify us if patient's weight does not go down after having lasix 80mg M, Tues, Wed.     Will send update to AMBER Rossi     Sliding Scale:  Wt less than 245 continue lasix 80 mg three times a week.    Wt >245 give lasix 80 mg the following 2 days regardless of what day it is, then go back to M, W, F.

## 2025-01-27 NOTE — PROGRESS NOTES
[facility] -      [facility]. Pt's lung sounds are clear and she has been snacking more. Called facility. Unable to leave VM.     1/26 246 lbs  1/25 248 lbs      Shruti Vazquez RN BAN   11:39 AM 1/27/2025    Nurse line M-F 8a-4p: [phone]

## 2025-01-29 NOTE — PROGRESS NOTES
[facility] -      Chart reviewed due to no call back. Weight gain addressed in another encounter.     Shruti Vazquez RN BAN   3:21 PM 1/29/2025    Nurse line M-F 8a-4p: [phone]

## 2025-02-02 ENCOUNTER — HOSPITAL ENCOUNTER (EMERGENCY)
Facility: CLINIC | Age: 75
Discharge: HOME | End: 2025-02-02
Attending: EMERGENCY MEDICINE | Admitting: EMERGENCY MEDICINE
Payer: COMMERCIAL

## 2025-02-02 VITALS
BODY MASS INDEX: 44.65 KG/M2 | HEART RATE: 90 BPM | WEIGHT: 252 LBS | OXYGEN SATURATION: 92 % | DIASTOLIC BLOOD PRESSURE: 111 MMHG | RESPIRATION RATE: 20 BRPM | TEMPERATURE: 99.6 F | SYSTOLIC BLOOD PRESSURE: 134 MMHG | HEIGHT: 63 IN

## 2025-02-02 DIAGNOSIS — T50.905A ADVERSE EFFECTS OF MEDICATION, INITIAL ENCOUNTER: ICD-10-CM

## 2025-02-02 DIAGNOSIS — R53.1 GENERALIZED WEAKNESS: ICD-10-CM

## 2025-02-02 DIAGNOSIS — N39.0 URINARY TRACT INFECTION IN FEMALE: ICD-10-CM

## 2025-02-02 LAB
ALBUMIN SERPL BCG-MCNC: 4.7 G/DL (ref 3.5–5.2)
ALBUMIN UR-MCNC: 10 MG/DL
ALP SERPL-CCNC: 65 U/L (ref 40–150)
ALT SERPL W P-5'-P-CCNC: 28 U/L (ref 0–50)
ANION GAP SERPL CALCULATED.3IONS-SCNC: 14 MMOL/L (ref 7–15)
APPEARANCE UR: CLEAR
AST SERPL W P-5'-P-CCNC: 22 U/L (ref 0–45)
ATRIAL RATE - MUSE: NORMAL BPM
BACTERIA #/AREA URNS HPF: ABNORMAL /HPF
BASOPHILS # BLD AUTO: 0 10E3/UL (ref 0–0.2)
BASOPHILS NFR BLD AUTO: 0 %
BILIRUB SERPL-MCNC: 1.3 MG/DL
BILIRUB UR QL STRIP: NEGATIVE
BUN SERPL-MCNC: 35 MG/DL (ref 8–23)
CALCIUM SERPL-MCNC: 10 MG/DL (ref 8.8–10.4)
CHLORIDE SERPL-SCNC: 106 MMOL/L (ref 98–107)
COLOR UR AUTO: YELLOW
CREAT SERPL-MCNC: 1.25 MG/DL (ref 0.51–0.95)
DIASTOLIC BLOOD PRESSURE - MUSE: NORMAL MMHG
EGFRCR SERPLBLD CKD-EPI 2021: 44 ML/MIN/1.73M2
EOSINOPHIL # BLD AUTO: 0 10E3/UL (ref 0–0.7)
EOSINOPHIL NFR BLD AUTO: 0 %
ERYTHROCYTE [DISTWIDTH] IN BLOOD BY AUTOMATED COUNT: 14.3 % (ref 10–15)
FLUAV RNA SPEC QL NAA+PROBE: NEGATIVE
FLUBV RNA RESP QL NAA+PROBE: NEGATIVE
GLUCOSE SERPL-MCNC: 123 MG/DL (ref 70–99)
GLUCOSE UR STRIP-MCNC: NEGATIVE MG/DL
HCO3 SERPL-SCNC: 24 MMOL/L (ref 22–29)
HCT VFR BLD AUTO: 38.6 % (ref 35–47)
HGB BLD-MCNC: 12.9 G/DL (ref 11.7–15.7)
HGB UR QL STRIP: NEGATIVE
HOLD SPECIMEN: NORMAL
HOLD SPECIMEN: NORMAL
IMM GRANULOCYTES # BLD: 0.1 10E3/UL
IMM GRANULOCYTES NFR BLD: 1 %
INTERPRETATION ECG - MUSE: NORMAL
KETONES UR STRIP-MCNC: NEGATIVE MG/DL
LACTATE SERPL-SCNC: 1.4 MMOL/L (ref 0.7–2)
LEUKOCYTE ESTERASE UR QL STRIP: ABNORMAL
LYMPHOCYTES # BLD AUTO: 1.7 10E3/UL (ref 0.8–5.3)
LYMPHOCYTES NFR BLD AUTO: 14 %
MCH RBC QN AUTO: 33.2 PG (ref 26.5–33)
MCHC RBC AUTO-ENTMCNC: 33.4 G/DL (ref 31.5–36.5)
MCV RBC AUTO: 100 FL (ref 78–100)
MONOCYTES # BLD AUTO: 0.9 10E3/UL (ref 0–1.3)
MONOCYTES NFR BLD AUTO: 7 %
NEUTROPHILS # BLD AUTO: 9.3 10E3/UL (ref 1.6–8.3)
NEUTROPHILS NFR BLD AUTO: 77 %
NITRATE UR QL: POSITIVE
NRBC # BLD AUTO: 0 10E3/UL
NRBC BLD AUTO-RTO: 0 /100
NT-PROBNP SERPL-MCNC: 4113 PG/ML (ref 0–1800)
P AXIS - MUSE: NORMAL DEGREES
PH UR STRIP: 6 [PH] (ref 5–7)
PLATELET # BLD AUTO: 243 10E3/UL (ref 150–450)
POTASSIUM SERPL-SCNC: 4.6 MMOL/L (ref 3.4–5.3)
PR INTERVAL - MUSE: NORMAL MS
PROCALCITONIN SERPL IA-MCNC: 0.06 NG/ML
PROT SERPL-MCNC: 7.6 G/DL (ref 6.4–8.3)
QRS DURATION - MUSE: 88 MS
QT - MUSE: 344 MS
QTC - MUSE: 427 MS
R AXIS - MUSE: 34 DEGREES
RBC # BLD AUTO: 3.88 10E6/UL (ref 3.8–5.2)
RBC URINE: 1 /HPF
RSV RNA SPEC NAA+PROBE: NEGATIVE
SARS-COV-2 RNA RESP QL NAA+PROBE: NEGATIVE
SODIUM SERPL-SCNC: 144 MMOL/L (ref 135–145)
SP GR UR STRIP: 1.02 (ref 1–1.03)
SQUAMOUS EPITHELIAL: <1 /HPF
SYSTOLIC BLOOD PRESSURE - MUSE: NORMAL MMHG
T AXIS - MUSE: 189 DEGREES
TROPONIN T SERPL HS-MCNC: 36 NG/L
TROPONIN T SERPL HS-MCNC: 36 NG/L
UROBILINOGEN UR STRIP-MCNC: NORMAL MG/DL
VENTRICULAR RATE- MUSE: 93 BPM
WBC # BLD AUTO: 12.1 10E3/UL (ref 4–11)
WBC URINE: 19 /HPF

## 2025-02-02 PROCEDURE — 87186 SC STD MICRODIL/AGAR DIL: CPT | Performed by: EMERGENCY MEDICINE

## 2025-02-02 PROCEDURE — 85004 AUTOMATED DIFF WBC COUNT: CPT | Performed by: EMERGENCY MEDICINE

## 2025-02-02 PROCEDURE — 81001 URINALYSIS AUTO W/SCOPE: CPT | Performed by: EMERGENCY MEDICINE

## 2025-02-02 PROCEDURE — 99284 EMERGENCY DEPT VISIT MOD MDM: CPT | Performed by: EMERGENCY MEDICINE

## 2025-02-02 PROCEDURE — 93005 ELECTROCARDIOGRAM TRACING: CPT | Performed by: EMERGENCY MEDICINE

## 2025-02-02 PROCEDURE — 83605 ASSAY OF LACTIC ACID: CPT | Performed by: EMERGENCY MEDICINE

## 2025-02-02 PROCEDURE — 82947 ASSAY GLUCOSE BLOOD QUANT: CPT | Performed by: EMERGENCY MEDICINE

## 2025-02-02 PROCEDURE — 82565 ASSAY OF CREATININE: CPT | Performed by: EMERGENCY MEDICINE

## 2025-02-02 PROCEDURE — 87040 BLOOD CULTURE FOR BACTERIA: CPT | Performed by: EMERGENCY MEDICINE

## 2025-02-02 PROCEDURE — 84145 PROCALCITONIN (PCT): CPT | Performed by: EMERGENCY MEDICINE

## 2025-02-02 PROCEDURE — 36415 COLL VENOUS BLD VENIPUNCTURE: CPT | Performed by: EMERGENCY MEDICINE

## 2025-02-02 PROCEDURE — 87637 SARSCOV2&INF A&B&RSV AMP PRB: CPT | Performed by: EMERGENCY MEDICINE

## 2025-02-02 PROCEDURE — 93010 ELECTROCARDIOGRAM REPORT: CPT | Performed by: EMERGENCY MEDICINE

## 2025-02-02 PROCEDURE — 84484 ASSAY OF TROPONIN QUANT: CPT | Performed by: EMERGENCY MEDICINE

## 2025-02-02 PROCEDURE — 83880 ASSAY OF NATRIURETIC PEPTIDE: CPT | Performed by: EMERGENCY MEDICINE

## 2025-02-02 RX ORDER — BACLOFEN 5 MG/1
5 TABLET ORAL 3 TIMES DAILY
COMMUNITY
Start: 2025-01-28

## 2025-02-02 RX ORDER — ACETAMINOPHEN 500 MG
500 TABLET ORAL 3 TIMES DAILY PRN
COMMUNITY

## 2025-02-02 RX ORDER — OSELTAMIVIR PHOSPHATE 30 MG/1
30 CAPSULE ORAL DAILY
COMMUNITY
Start: 2025-01-24 | End: 2025-02-06

## 2025-02-02 RX ORDER — METOPROLOL SUCCINATE 50 MG/1
50 TABLET, EXTENDED RELEASE ORAL DAILY
COMMUNITY
Start: 2025-01-01

## 2025-02-02 RX ORDER — FUROSEMIDE 80 MG/1
80 TABLET ORAL
COMMUNITY

## 2025-02-02 RX ORDER — CEPHALEXIN 500 MG/1
500 CAPSULE ORAL 3 TIMES DAILY
Qty: 9 CAPSULE | Refills: 0 | Status: SHIPPED | OUTPATIENT
Start: 2025-02-02

## 2025-02-02 RX ORDER — CEPHALEXIN 500 MG/1
500 CAPSULE ORAL 3 TIMES DAILY
Qty: 9 CAPSULE | Refills: 0 | Status: SHIPPED | OUTPATIENT
Start: 2025-02-02 | End: 2025-02-02

## 2025-02-02 ASSESSMENT — ENCOUNTER SYMPTOMS
ACTIVITY CHANGE: 1
SHORTNESS OF BREATH: 0
ABDOMINAL PAIN: 0
FATIGUE: 1

## 2025-02-02 ASSESSMENT — ACTIVITIES OF DAILY LIVING (ADL)
ADLS_ACUITY_SCORE: 67
ADLS_ACUITY_SCORE: 67
ADLS_ACUITY_SCORE: 63
ADLS_ACUITY_SCORE: 63
ADLS_ACUITY_SCORE: 67
ADLS_ACUITY_SCORE: 67

## 2025-02-02 ASSESSMENT — COLUMBIA-SUICIDE SEVERITY RATING SCALE - C-SSRS
6. HAVE YOU EVER DONE ANYTHING, STARTED TO DO ANYTHING, OR PREPARED TO DO ANYTHING TO END YOUR LIFE?: NO
2. HAVE YOU ACTUALLY HAD ANY THOUGHTS OF KILLING YOURSELF IN THE PAST MONTH?: NO
1. IN THE PAST MONTH, HAVE YOU WISHED YOU WERE DEAD OR WISHED YOU COULD GO TO SLEEP AND NOT WAKE UP?: NO

## 2025-02-02 NOTE — ED PROVIDER NOTES
History     Chief Complaint   Patient presents with    Generalized Weakness     HPI  Jayashree Carson is a 74 year old female who presents with cognitive change and generalized weakness.  Staff at the [facility] recognized change and recommended she be evaluated emergency department.  Patient has been receiving tramadol more routinely for her pain complaints.  Recently's been getting tramadol for 3 doses per day.  Patient also was recently started on baclofen on January 28.  No report of fever, chills or night sweats.      Significant past medical history is chronic atrial fibrillation with intermittent RVR, chronic right sided heart failure, neuropathic pain, hypertension, COPD/pulmonary hypertension, CKD stage IIIb.      Allergies:  Allergies   Allergen Reactions    Oxycodone Confusion     Patient had confusion, paranoid, aggression following administration.  Per family this has happened with narcotics in the past as well (details unknown)    Blood-Group Specific Substance Unknown     Patient has a non-specific antibody.  Blood Product orders may be delayed.  Draw one red top and two purple top tubes for ALL Type and Screen/ Type and Crossmatch orders.    Iodinated Contrast Media      RASH    Lisinopril Unknown     Taken off per cardiology    Sulfamethoxazole-Trimethoprim      STOMACH CRAMPS       Problem List:    Patient Active Problem List    Diagnosis Date Noted    Atrial fibrillation with RVR (H) 05/09/2024     Priority: High    Morbid obesity (H) 05/18/2024     Priority: Medium    Malnutrition 05/17/2024     Priority: Medium    Chronic right heart failure (H) 05/16/2024     Priority: Medium    Metabolic encephalopathy 05/16/2024     Priority: Medium    Paranoia (H) 05/16/2024     Priority: Medium    Neuropathic pain 05/16/2024     Priority: Medium    Presence of Watchman left atrial appendage closure device 05/10/2024     Priority: Medium    History of CVA (cerebrovascular accident) with  residual left sided weakness 05/10/2024     Priority: Medium    Essential hypertension 05/10/2024     Priority: Medium    COPD (chronic obstructive pulmonary disease) (H) 05/10/2024     Priority: Medium    Pulmonary hypertension (H) 05/10/2024     Priority: Medium    Hyperkalemia 05/09/2024     Priority: Medium    Acute renal failure superimposed on stage 3a chronic kidney disease, unspecified acute renal failure type (H) 05/09/2024     Priority: Medium    SIRS (systemic inflammatory response syndrome) (H) 05/09/2024     Priority: Medium    Increased anion gap metabolic acidosis 05/09/2024     Priority: Medium    Arterial hypotension 05/09/2024     Priority: Medium    Asymptomatic bacteriuria 05/09/2024     Priority: Medium        Past Medical History:    No past medical history on file.    Past Surgical History:    No past surgical history on file.    Family History:    No family history on file.    Social History:  Marital Status:  Single [1]  Social History     Tobacco Use    Smoking status: Never    Smokeless tobacco: Never        Medications:    acetaminophen (TYLENOL) 500 MG tablet  albuterol (PROAIR HFA/PROVENTIL HFA/VENTOLIN HFA) 108 (90 Base) MCG/ACT inhaler  albuterol (PROVENTIL) (2.5 MG/3ML) 0.083% neb solution  aspirin 81 MG EC tablet  atorvastatin (LIPITOR) 40 MG tablet  calcium carbonate (TUMS) 500 MG chewable tablet  cetirizine (ZYRTEC) 10 MG tablet  digoxin (LANOXIN) 62.5 MCG tablet  fluticasone (FLONASE) 50 MCG/ACT nasal spray  furosemide (LASIX) 40 MG tablet  gabapentin (NEURONTIN) 100 MG capsule  metoprolol tartrate (LOPRESSOR) 25 MG tablet  multivitamin w/minerals (THERA-VIT-M) tablet  predniSONE (DELTASONE) 5 MG tablet  simethicone 80 MG TABS  spironolactone (ALDACTONE) 25 MG tablet  traMADol (ULTRAM) 50 MG tablet          Review of Systems   Constitutional:  Positive for activity change and fatigue.   Respiratory:  Negative for shortness of breath.    Cardiovascular:  Positive for leg swelling.  "Negative for chest pain.   Gastrointestinal:  Negative for abdominal pain.   All other systems reviewed and are negative.      Physical Exam   BP: 113/87  Pulse: 107  Temp: 97.6  F (36.4  C)  Resp: 22  Height: 160 cm (5' 3\")  Weight: 114.3 kg (252 lb)  SpO2: 93 %      Physical Exam  Vital signs reviewed  Morbidly obese  H EENT exam-normal  Heart irregularly irregular consistent with atrial fibrillation.  Rate initially 123 bpm.  Repeat heart rate = 91 bpm.  Respiratory: No cough or congestion.  Poor inspiratory effort with decreased breath sounds at lung bases.  No crackles to suggest atelectasis or infiltrate.  No consolidation.  No wheezing.  Abdomen: Morbidly obese.  Slightly firm.  No complaints of pain or tenderness with palpation or percussion.  Genitalia: Urinary continence causing some erythema in the labia but no skin breakdown  Extremities show +1 nonpitting edema with morbid obesity  Neurological examination: Generalized weakness with NIHSS = 0  National Institutes of Health Stroke Scale  Exam Interval: arrival   Score    Level of consciousness: (0)   Alert, keenly responsive    LOC questions: (0)   Answers both questions correctly    LOC commands: (0)   Performs both tasks correctly    Best gaze: (0)   Normal    Visual: (0)   No visual loss    Facial palsy: (0)   Normal symmetrical movements    Motor arm (left): (0)   No drift    Motor arm (right): (0)   No drift    Motor leg (left): (0)   No drift    Motor leg (right): (0)   No drift    Limb ataxia: (0)   Absent    Sensory: (0)   Normal- no sensory loss    Best language: (0)   Normal- no aphasia    Dysarthria: (0)   Normal    Extinction and inattention: (0)   No abnormality        Total Score:  0      ED Course        Procedures    EKG:  Interpretation by Jose L Salgado DO.   Indication: Generalized weakness  Atrial fibrillation.  Rate 93 bpm.  ST-T abnormalities inferolateral with no signs for acute ST elevation.  No ventricular ectopy.  Normal " axis.  Impressions-abnormal EKG with underlying atrial fibrillation and nonspecific ST-T wave abnormalities that have been present in prior EKGs.  Unchanged.                           Medications - No data to display    Assessments & Plan (with Medical Decision Making) Natasha is 74 years of age.  Came from [facility] due to cognitive change/confusion and generalized weakness.  Patient was recently started on baclofen.  This is known to cause high risk for weakness, nausea, cognitive change.  Workup identified a urinary tract infection starting with no systemic involvement.  Procalcitonin was normal.  Lactic acid normal.  No leukocytosis identified fever.  Normal neurological examination therefore no head CT warranted.  Perineal irritation from a incontinence but no skin breakdown.  Stable CKD stage IIIb  Stable chronic right-sided heart failure  Plan: For nursing home staff/orders:  Discharge back to the nursing home.  Stop baclofen  Stop tramadol-most likely is also contributing to confusion  Start Tylenol 1000 mg every 6-8 hours as needed for pain with a maximum dose of 3000 mg every 24 hours  Treat UTI with cephalexin 5 mg 3 times daily x 3 days  Urine culture pending     I have reviewed the nursing notes.    I have reviewed the findings, diagnosis, plan and need for follow up with the patient.          New Prescriptions    No medications on file       Final diagnoses:   Generalized weakness   Adverse effects of medication, initial encounter - Baclofen side effect   Urinary tract infection in female       2/2/2025   [facility] EMERGENCY DEPT       Jose L Salgado,   02/02/25 1422

## 2025-02-02 NOTE — ED NOTES
Pt ready for discharge back to [facility], waiting for EMS transport back, no EMS ETA at this time.

## 2025-02-02 NOTE — ED TRIAGE NOTES
Patient brought to ED via EMS from [facility] with concerns about staff noticing some confusion and general weakness. She was started on Baclofen 1/28.

## 2025-02-02 NOTE — MEDICATION SCRIBE - ADMISSION MEDICATION HISTORY
Medication Scribe Admission Medication History    Admission medication history is complete. The information provided in this note is only as accurate as the sources available at the time of the update.    Information Source(s): Facility (U/NH/) medication list/MAR via N/A    Pertinent Information: [facility] MAR- morning medications has not been administered     Changes made to PTA medication list:  Added: Oseltamivir Phosphate 30 mg daily  Baclofen 5 mg TID  Acetaminophen 500 mg PRN   Artificial Tears QID PRN   Added per MAR indications of current orders   Deleted: Calcium Carbonate 500 mg - no longer taking   Changed: Metoprolol Tartrate 25 mg changed to Succinate ER 50 mg per MAR supported by reconcile list and dispense HX     Allergies reviewed with patient and updates made in EHR: no    Medication History Completed By: ALONDRA DARNELL 2/2/2025 1:29 PM    PTA Med List   Medication Sig Last Dose/Taking    acetaminophen (TYLENOL) 500 MG tablet Take 500 mg by mouth 3 times daily as needed for mild pain. More than a month    acetaminophen (TYLENOL) 500 MG tablet Take 2 tablets (1,000 mg) by mouth 3 times daily 2/1/2025 Bedtime    albuterol (PROAIR HFA/PROVENTIL HFA/VENTOLIN HFA) 108 (90 Base) MCG/ACT inhaler Inhale 2 puffs into the lungs every 6 hours as needed for shortness of breath, wheezing or cough 2/2/2025 at  5:44 AM    albuterol (PROVENTIL) (2.5 MG/3ML) 0.083% neb solution Take 2.5 mg by nebulization every 6 hours as needed for shortness of breath, wheezing or cough More than a month    aspirin 81 MG EC tablet Take 81 mg by mouth daily 2/1/2025 Morning    atorvastatin (LIPITOR) 40 MG tablet Take 40 mg by mouth at bedtime. 2/1/2025 Bedtime    Baclofen (LIORESAL) 5 MG tablet Take 5 mg by mouth 3 times daily. 2/1/2025 Bedtime    cetirizine (ZYRTEC) 10 MG tablet Take 10 mg by mouth daily. 2/1/2025 Morning    digoxin (LANOXIN) 62.5 MCG tablet Take 1 tablet (62.5 mcg) by mouth daily 2/1/2025 Morning     fluticasone (FLONASE) 50 MCG/ACT nasal spray Spray 1 spray into both nostrils daily More than a month    furosemide (LASIX) 80 MG tablet Take 80 mg by mouth. Take 1 tablet (80 mg) Mondays Wednesdays and Fridays 1/31/2025 Morning    gabapentin (NEURONTIN) 100 MG capsule Take 2 capsules (200 mg) by mouth 3 times daily 2/1/2025 Bedtime    hypromellose (ARTIFICIAL TEARS) 0.5 % SOLN ophthalmic solution Place 1 drop into both eyes 4 times daily as needed for dry eyes. 1/25/2025 at 11:47 AM    metoprolol succinate ER (TOPROL XL) 50 MG 24 hr tablet Take 50 mg by mouth daily. 2/1/2025 Morning    multivitamin w/minerals (THERA-VIT-M) tablet Take 1 tablet by mouth daily 2/1/2025 at  5:00 PM    oseltamivir (TAMIFLU) 30 MG capsule Take 30 mg by mouth daily. 2/1/2025 Morning    predniSONE (DELTASONE) 5 MG tablet Take 5 mg by mouth daily 2/1/2025 Morning    simethicone 80 MG TABS Take 1 tablet by mouth every 6 hours. 2/2/2025 at  5:44 AM    spironolactone (ALDACTONE) 25 MG tablet Take 25 mg by mouth daily 2/1/2025 Morning    traMADol (ULTRAM) 50 MG tablet Take 1 tablet (50 mg) by mouth every 8 hours as needed for severe pain 2/2/2025 at  5:43 AM

## 2025-02-02 NOTE — DISCHARGE INSTRUCTIONS
Instructions regarding [facility] staff:    1.  Continue current medications and care plan with exception of discontinuing use of baclofen.    2.  Identified have a urinary tract infection.  Urine culture pending and results should be available in 24 hours.  Call will be placed to the nursing home if a change in antibiotic therapy is warranted    3.  Cephalexin 500 mg 3 times daily for 3 days    4.  Please stop tramadol this could be contributing to cognitive impairment    5.  Tylenol 1000 mg every 6-8 hours as needed for pain complaint.  Maximum recommended dose is 3000 mg every 24 hours

## 2025-02-03 LAB — BACTERIA UR CULT: ABNORMAL

## 2025-02-04 ENCOUNTER — TELEPHONE (OUTPATIENT)
Dept: NURSING | Facility: CLINIC | Age: 75
End: 2025-02-04
Payer: COMMERCIAL

## 2025-02-04 NOTE — TELEPHONE ENCOUNTER
[facility]    Reason for call: Lab Result Notification     Lab Result (including Rx patient on, if applicable).  If culture, copy of lab report at bottom.  Lab Result: Urine Culture  2/2/25 Prescribed CephALEXin (KEFLEX) 500 MG capsule Take 1 capsule (500 mg) by mouth 3 times daily. - Oral (SUSCEPTIBLE)    Creatinine Level (mg/dl)   Creatinine   Date Value Ref Range Status   02/02/2025 1.25 (H) 0.51 - 0.95 mg/dL Final    Creatinine clearance (ml/min), if applicable    Serum creatinine: 1.25 mg/dL (H) 02/02/25 1122  Estimated creatinine clearance: 46.7 mL/min (A)     RN Recommendations/Instructions per [place] ED lab result protocol:   [facility] ED lab result protocol utilized: Urine Culture    Patient's current Symptoms:   Unable to assess.  Pt resides at [facility].  Urine Culture results faxed to [phone]        Sarah Scales RN

## 2025-02-06 LAB — BACTERIA BLD CULT: NORMAL

## 2025-02-10 ENCOUNTER — TELEPHONE (OUTPATIENT)
Dept: CARDIOLOGY | Facility: CLINIC | Age: 75
End: 2025-02-10
Payer: COMMERCIAL

## 2025-02-11 ENCOUNTER — DOCUMENTATION ONLY (OUTPATIENT)
Dept: OTHER | Facility: CLINIC | Age: 75
End: 2025-02-11
Payer: COMMERCIAL

## 2025-03-03 ENCOUNTER — TELEPHONE (OUTPATIENT)
Dept: CARDIOLOGY | Facility: CLINIC | Age: 75
End: 2025-03-03
Payer: COMMERCIAL

## 2025-03-03 DIAGNOSIS — I50.812 CHRONIC RIGHT HEART FAILURE (H): Primary | ICD-10-CM

## 2025-03-03 NOTE — TELEPHONE ENCOUNTER
[facility] Call Center    Phone Message    May a detailed message be left on voicemail: yes     Reason for Call: Other: Kieran RN is calling to report a 3 pound weight gain over the past 24 hours. Weight has increased to 248 pounds. Pt weight was 243 the day before yesterday.      Action Taken: TE SENT    Travel Screening: Not Applicable     Date of Service:                     Thank you!  [facility]

## 2025-03-03 NOTE — TELEPHONE ENCOUNTER
Kieran QUEEN is calling to report a 3 pound weight gain over the past 24 hours.    Saturday 3/1 - 243#  Segundo 3/2 - 245 #  Monday 3/3 - 248 #    Per AMBER Rossi sliding scale recommendations noted below gave instructions to give patient a one time dose of lasix 80mg daily tomorrow Tuesday 3/4/25.   [facility] Fax [phone]       As of 1/8/25 sliding scale orders per AMBER Rossi:  Wt less than 245 continue lasix 80 mg three times a week.   Wt >245 give lasix 80 mg the following 2 days regardless of what day it is, then go back to M, W, F.    Will route to AMBER Rossi to give an update.

## 2025-03-04 NOTE — TELEPHONE ENCOUNTER
Ana M from [facility] called with update on weights on patient form over the weekend. Ana M said no new or worsening symptoms such as shortness of breath or swelling.     Weights:  2/13 246# Th no lasix  2/14 246# F lasix 80mg  2/15 245# Sat lasix 80mg   2/16 244# Sun no lasix  2/17 245# M lasix 80mg    Will further discuss plan with AMBER Rossi tomorrow morning 2/18/25 when provider is back in office and will then give [facility] nurses a call with further recommendations [phone].   
Called [facility] facility spoke with nurse to let them know we are keeping same plan and continue to notify us as they have been. Nurse verbalized understanding.   
[facility] Nurse called again with an updated weight on patient.     2/9 246#  2/10 246#  2/11 245#  2/12 245#  2/13 246#    Instructed to give lasix 80mg on Saturday 2/15/25 following sliding scale and call on Monday 2/17/25 with an update on patient's weights. Patient's scheduled lasix 80mg MWF.     Will send AMBER Rossi an update to see if need to make any further adjustments and when she would like next BMP drawn.         
Myriam Nurse from [facility] calling to report weight gain today and yesterday.     Patient's weight's:  2/9 246#  2/10 246#    Per AMBER Rossi Sliding scale verbal orders given to Anu to give extra lasix 80mg tomorrow Tuesday 2/11/25 then go back to typical regimen (Lasix 80mg MWF).    Will send AMBRE Rossi an update to review when returns to office 2/18/25.     Sliding Scale as of 1/9/25:  Wt less than 245 continue lasix 80 mg three times a week.    Wt >245 give lasix 80 mg the following 2 days regardless of what day it is, then go back to M, W, F.    Most recent Chem panels and N-Terminal pro BNP     Component      Latest Ref Rng 1/22/2025  9:02 AM 2/2/2025  11:22 AM   Sodium      135 - 145 mmol/L 143  144    Potassium      3.4 - 5.3 mmol/L 4.4  4.6    Carbon Dioxide (CO2)      22 - 29 mmol/L 24  24    Anion Gap      7 - 15 mmol/L 13  14    Urea Nitrogen      8.0 - 23.0 mg/dL 30.3 (H)  35.0 (H)    Creatinine      0.51 - 0.95 mg/dL 1.32 (H)  1.25 (H)    GFR Estimate      >60 mL/min/1.73m2 42 (L)  44 (L)    Calcium      8.8 - 10.4 mg/dL 9.9  10.0    Chloride      98 - 107 mmol/L 106  106    Glucose      70 - 99 mg/dL  123 (H)    Alkaline Phosphatase      40 - 150 U/L  65    AST      0 - 45 U/L  22    ALT      0 - 50 U/L  28    Protein Total      6.4 - 8.3 g/dL  7.6    Albumin      3.5 - 5.2 g/dL  4.7    Bilirubin Total      <=1.2 mg/dL  1.3 (H)    N-Terminal Pro Bnp      0 - 1,800 pg/mL 2,412 (H)     N-Terminal Pro BNP Inpatient      0 - 1,800 pg/mL  4,113 (H)       Legend:  (H) High  (L) Low  
Update appreciated.   Cr and BUN improved, bnp elev.    IF we need to continue doing lots of prn lasix, would switch to bumex 3 mg M, W, F and see if that's more effective .   Mary Green PA-C 2/17/2025 4:00 PM      
Cold/Sinus

## 2025-03-05 NOTE — TELEPHONE ENCOUNTER
Called Myriam with recommendations from AMBER Rossi. They will draw lab BMP on Friday 3/8/25 and continue to give lasix 80mg daily through weekend and call with an update on a Monday morning 3/10/25. Will fax orders to facility.

## 2025-03-05 NOTE — TELEPHONE ENCOUNTER
Myriam nurse from [facility] called to report no change in weight after patient had an additional one time dose of lasix 80mg on Tuesday 3/4/25.  No change with symptoms, no increase in shortness of breath or swelling.  So far patient has had Lasix 80mg Monday,Tuesday, Wednesday and will give additional one time dose 80mg on Thursday, then will have scheduled dose on Friday.   Weights:  Tuesday 3/4 - 248#  Wednesday 3/5 - 248#    Myriam is wondering if patient should have BMP drawn on Friday and if weight is still up what would the plan be for over the weekend?     Routing to AMBER Rossi for further recommendation.

## 2025-03-05 NOTE — TELEPHONE ENCOUNTER
Bmp tomorrow or Friday please.    If wt remains up over the wknd continue with lasix 80 mg daily.  If that's a problem with orders, plan can be just 80 mg through the weekend then they update us on Monday.   Mary Green PA-C 3/5/2025 2:44 PM

## 2025-03-06 ENCOUNTER — LAB REQUISITION (OUTPATIENT)
Dept: LAB | Facility: CLINIC | Age: 75
End: 2025-03-06
Payer: COMMERCIAL

## 2025-03-06 DIAGNOSIS — R63.5 ABNORMAL WEIGHT GAIN: ICD-10-CM

## 2025-03-07 ENCOUNTER — MEDICAL CORRESPONDENCE (OUTPATIENT)
Dept: HEALTH INFORMATION MANAGEMENT | Facility: CLINIC | Age: 75
End: 2025-03-07

## 2025-03-07 LAB
ANION GAP SERPL CALCULATED.3IONS-SCNC: 12 MMOL/L (ref 7–15)
BUN SERPL-MCNC: 42.4 MG/DL (ref 8–23)
CALCIUM SERPL-MCNC: 9.5 MG/DL (ref 8.8–10.4)
CHLORIDE SERPL-SCNC: 103 MMOL/L (ref 98–107)
CREAT SERPL-MCNC: 1.35 MG/DL (ref 0.51–0.95)
EGFRCR SERPLBLD CKD-EPI 2021: 41 ML/MIN/1.73M2
GLUCOSE SERPL-MCNC: 90 MG/DL (ref 70–99)
HCO3 SERPL-SCNC: 26 MMOL/L (ref 22–29)
POTASSIUM SERPL-SCNC: 4 MMOL/L (ref 3.4–5.3)
SODIUM SERPL-SCNC: 141 MMOL/L (ref 135–145)

## 2025-03-07 PROCEDURE — 80048 BASIC METABOLIC PNL TOTAL CA: CPT | Mod: ORL | Performed by: FAMILY MEDICINE

## 2025-03-07 PROCEDURE — 36415 COLL VENOUS BLD VENIPUNCTURE: CPT | Mod: ORL | Performed by: FAMILY MEDICINE

## 2025-03-07 PROCEDURE — P9603 ONE-WAY ALLOW PRORATED MILES: HCPCS | Mod: ORL | Performed by: FAMILY MEDICINE

## 2025-03-07 NOTE — TELEPHONE ENCOUNTER
Received VM from Kieran, with [facility]. BMP results are back, asked if we needed them faxed. Returned call, Kieran gone, but spoke with oncoming RN. Reviewed we have lab results viewable, no need to fax. Plan through weekend is 80mg Lasix daily with update/further recommendations on Monday. Nurse stated understanding.         Future Appointments   Date Time Provider Department Center   4/17/2025  9:35 AM More, Mary Kaiser PA-C [facility]      Lucy Ojeda, RN, BSN  03/07/25 at 2:46 PM

## 2025-03-10 RX ORDER — FUROSEMIDE 80 MG/1
80 TABLET ORAL DAILY
Qty: 90 TABLET | Refills: 3 | Status: SHIPPED | OUTPATIENT
Start: 2025-03-10

## 2025-03-10 RX ORDER — FUROSEMIDE 80 MG/1
80 TABLET ORAL DAILY
Qty: 90 TABLET | Refills: 3 | Status: SHIPPED | OUTPATIENT
Start: 2025-03-10 | End: 2025-03-10

## 2025-03-10 NOTE — TELEPHONE ENCOUNTER
Lasix changed to 80mg daily. BMP order placed to be drawn in one week. AMBER Rossi would like an update on patient's weight in one week from facility nursing. Faxed AMBER Rossi orders to [facility] [phone].

## 2025-03-10 NOTE — TELEPHONE ENCOUNTER
Received call from [facility] to report that patient's weights are now stable with patient being on lasix 80mg daily x1 week. Patient had BMP drawn on 3/7/25 (labs attached below by another RN on 3/7/25). Myriam faxed weight log to cardiology in [place]. Will have it scanned into epic chart.     3/3/25 248  3/4/25 247  3/5/25 248  3/6/25 245  3/7/25 244  3/8/25 243  3/9/25 242  3/10/25 243     Will route to AMBER Rossi to review and give further recommendation.

## 2025-03-10 NOTE — TELEPHONE ENCOUNTER
I'm glad wt is down, Cr stable.    Let's continue on lasix 80 mg daily, repeat bmp in 1 wk and update on wt in 1 wk as well please.  Mary Green PA-C 3/10/2025 2:23 PM

## 2025-03-16 ENCOUNTER — LAB REQUISITION (OUTPATIENT)
Dept: LAB | Facility: CLINIC | Age: 75
End: 2025-03-16
Payer: COMMERCIAL

## 2025-03-16 DIAGNOSIS — R63.5 ABNORMAL WEIGHT GAIN: ICD-10-CM

## 2025-03-17 LAB
ANION GAP SERPL CALCULATED.3IONS-SCNC: 14 MMOL/L (ref 7–15)
BUN SERPL-MCNC: 39.4 MG/DL (ref 8–23)
CALCIUM SERPL-MCNC: 9.2 MG/DL (ref 8.8–10.4)
CHLORIDE SERPL-SCNC: 102 MMOL/L (ref 98–107)
CREAT SERPL-MCNC: 1.55 MG/DL (ref 0.51–0.95)
EGFRCR SERPLBLD CKD-EPI 2021: 34 ML/MIN/1.73M2
GLUCOSE SERPL-MCNC: 96 MG/DL (ref 70–99)
HCO3 SERPL-SCNC: 24 MMOL/L (ref 22–29)
POTASSIUM SERPL-SCNC: 4.1 MMOL/L (ref 3.4–5.3)
SODIUM SERPL-SCNC: 140 MMOL/L (ref 135–145)

## 2025-03-17 PROCEDURE — 80048 BASIC METABOLIC PNL TOTAL CA: CPT | Mod: ORL | Performed by: FAMILY MEDICINE

## 2025-03-17 PROCEDURE — P9604 ONE-WAY ALLOW PRORATED TRIP: HCPCS | Mod: ORL | Performed by: FAMILY MEDICINE

## 2025-03-17 PROCEDURE — 36415 COLL VENOUS BLD VENIPUNCTURE: CPT | Mod: ORL | Performed by: FAMILY MEDICINE

## 2025-04-17 ENCOUNTER — OFFICE VISIT (OUTPATIENT)
Dept: CARDIOLOGY | Facility: CLINIC | Age: 75
End: 2025-04-17
Payer: COMMERCIAL

## 2025-04-17 VITALS
DIASTOLIC BLOOD PRESSURE: 70 MMHG | BODY MASS INDEX: 43.59 KG/M2 | OXYGEN SATURATION: 95 % | HEIGHT: 63 IN | WEIGHT: 246 LBS | HEART RATE: 74 BPM | SYSTOLIC BLOOD PRESSURE: 112 MMHG

## 2025-04-17 DIAGNOSIS — I50.812 CHRONIC RIGHT HEART FAILURE (H): ICD-10-CM

## 2025-04-17 DIAGNOSIS — M79.2 NEUROPATHIC PAIN: ICD-10-CM

## 2025-04-17 LAB
ANION GAP SERPL CALCULATED.3IONS-SCNC: 9 MMOL/L (ref 7–15)
BUN SERPL-MCNC: 38 MG/DL (ref 8–23)
CALCIUM SERPL-MCNC: 9.9 MG/DL (ref 8.8–10.4)
CHLORIDE SERPL-SCNC: 102 MMOL/L (ref 98–107)
CREAT SERPL-MCNC: 1.33 MG/DL (ref 0.51–0.95)
EGFRCR SERPLBLD CKD-EPI 2021: 41 ML/MIN/1.73M2
GLUCOSE SERPL-MCNC: 104 MG/DL (ref 70–99)
HCO3 SERPL-SCNC: 29 MMOL/L (ref 22–29)
POTASSIUM SERPL-SCNC: 4.4 MMOL/L (ref 3.4–5.3)
SODIUM SERPL-SCNC: 140 MMOL/L (ref 135–145)

## 2025-04-17 RX ORDER — ACETAMINOPHEN 500 MG
TABLET ORAL
Qty: 120 TABLET | Refills: 3 | Status: SHIPPED | OUTPATIENT
Start: 2025-04-17

## 2025-04-17 RX ORDER — TORSEMIDE 20 MG/1
TABLET ORAL
Qty: 48 TABLET | Refills: 3 | Status: SHIPPED | OUTPATIENT
Start: 2025-04-17

## 2025-04-17 ASSESSMENT — PAIN SCALES - GENERAL: PAINLEVEL_OUTOF10: NO PAIN (0)

## 2025-04-17 NOTE — PROGRESS NOTES
"  Cardiology Clinic Progress Note    Service Date: 2025     Primary Cardiologist: was Dr. Smith      Reason for Visit: afib, hfpef     HPI:   Jayashree Carson is a 74-year-old woman with past medical history significant for the followin.  Permanent atrial fibrillation with Watchman in place  2.  CKD  3.  HFpEF  4.  Moderate TR  5.  Elevated pulmonary pressures with RVSP 49 + RAP on echo   6.  COPD  7.  Wheelchair-bound due to history of CVA with left-sided weakness.    Natasha comes back to clinic today and is frustrated.  She is dealing with constipation as painful bowel movements.  She denies shortness of breath or noticing that her legs are edematous.  She says no one tells her her legs are swollen.  She hates the water pill she thinks is contributing to her constipation and given she is requires assistance her briefs are not changed frequently enough and she states \"sick of sitting in her own urine\".  She ambulates very little.  She also complains of significant pain and not getting her Tylenol when she needs it.    Social History:  She lives at [facility].  Her daughter Morelia comes in today.  Notes there is been a lot of turnover at her facility.  Patient's granddaughter also has cancer who her daughter is supporting as well.  Patient has lived there about 1 year.    Physical Exam:  /70 (BP Location: Right arm, Patient Position: Sitting, Cuff Size: Adult Large)   Pulse 74   Ht 1.6 m (5' 3\")   Wt 111.6 kg (246 lb)   SpO2 95%   BMI 43.58 kg/m     Elderly, frail-appearing woman in no acute distress.  Obvious left-sided weakness and contractures.  Heart is irregularly irregular, I do not appreciate murmur rub or gallop.  Lungs are clear without wheezes rales or rhonchi.  Right leg without edema, trace edema on the left varicosities  noted.      Data reviewed:  Multiple BMPs multiple phone visits    Assessment and Plan:  Permanent atrial fibrillation with Watchman device in place, " rates controlled on EKG today.  She is appropriately on aspirin.      2.  Heart failure with preserved ejection fraction primarily with right-sided symptoms with her being relatively euvolemic today at a weight of 246 pounds at her facility.  She is quite frustrated with the daily diuretic and will switch today to torsemide 80 mg Monday Wednesdays Fridays this will at least give her a few days off and hopefully help with her constipation.  Since her lungs are clear and she has minimal edema at this weight I think it is reasonable for them to call for a weight of 250 or higher.  If her weight hits 250 we will plan on increasing torsemide to 100 mg Monday Wednesday Friday.  Creatinine has been mildly elevated on this regimen, potassium has been normal.  Otherwise we will continue spironolactone for HFpEF, not a good candidate for SGLT2 inhibitor given her incontinence, Entresto would be an option although she has also had issues with hypotension.  As such we will continue briskly with just diuretic.    3.  CKD, creatinine stable.    4.  CVA with left-sided weakness wheelchair-bound.    5.  Constipation, will defer to facility for additional stool softeners if change in diuretic does not help.    5.  Knee and back pain which seems to worsen after sitting in her wheelchair all morning.  I will change her Tylenol to be 500 mg in the morning, 1000 mg at lunch, and continue at 500 mg in the evening.  In addition she will get 500 mg as needed as needed for total of 3000 mg daily.    We'll try to find a happy medium with her diuretic and her other symptoms.  Will follow her relatively closely and see her back in 3 months with a BMP before that visit.  Call sooner with concerns.    Mary Green PA-C  [facility] Cardiology     This note was written using Dragon voice recognition system, please excuse any misspelled names, or nonsensical words and call with any questions.      The longitudinal plan of care for the  diagnosis(es)/condition(s) as documented were addressed during this visit. Due to the added complexity in care, I will continue to support Jayashree in the subsequent management and with ongoing continuity of care.    Orders this visit:  Orders Placed This Encounter   Procedures    Basic metabolic panel    Follow-Up with Cardiology [facility]     Orders Placed This Encounter   Medications    acetaminophen (TYLENOL) 500 MG tablet     Sig: Take 1 tablet (500 mg) by mouth every morning AND 2 tablets (1,000 mg) daily (with lunch) AND 1 tablet (500 mg) every evening.     Dispense:  120 tablet     Refill:  3    torsemide (DEMADEX) 20 MG tablet     Si mg on Mon, Wed, Fri, call for wt gain >250 pounds     Dispense:  48 tablet     Refill:  3     Medications Discontinued During This Encounter   Medication Reason    Baclofen (LIORESAL) 5 MG tablet Therapy completed (No AVS)    cephALEXin (KEFLEX) 500 MG capsule Therapy completed (No AVS)    traMADol (ULTRAM) 50 MG tablet Therapy completed (No AVS)    furosemide (LASIX) 80 MG tablet     acetaminophen (TYLENOL) 500 MG tablet        Encounter Diagnoses   Name Primary?    Chronic right heart failure (H)     Neuropathic pain          Current Medications:  Current Outpatient Medications   Medication Sig Dispense Refill    acetaminophen (TYLENOL) 500 MG tablet Take 1 tablet (500 mg) by mouth every morning AND 2 tablets (1,000 mg) daily (with lunch) AND 1 tablet (500 mg) every evening. 120 tablet 3    acetaminophen (TYLENOL) 500 MG tablet Take 500 mg by mouth 3 times daily as needed for mild pain.      albuterol (PROAIR HFA/PROVENTIL HFA/VENTOLIN HFA) 108 (90 Base) MCG/ACT inhaler Inhale 2 puffs into the lungs every 6 hours as needed for shortness of breath, wheezing or cough      albuterol (PROVENTIL) (2.5 MG/3ML) 0.083% neb solution Take 2.5 mg by nebulization every 6 hours as needed for shortness of breath, wheezing or cough      aspirin 81 MG EC tablet Take 81 mg by mouth daily       atorvastatin (LIPITOR) 40 MG tablet Take 40 mg by mouth at bedtime.      cetirizine (ZYRTEC) 10 MG tablet Take 10 mg by mouth daily.      digoxin (LANOXIN) 62.5 MCG tablet Take 1 tablet (62.5 mcg) by mouth daily      fluticasone (FLONASE) 50 MCG/ACT nasal spray Spray 1 spray into both nostrils daily      gabapentin (NEURONTIN) 100 MG capsule Take 2 capsules (200 mg) by mouth 3 times daily      hypromellose (ARTIFICIAL TEARS) 0.5 % SOLN ophthalmic solution Place 1 drop into both eyes 4 times daily as needed for dry eyes.      metoprolol succinate ER (TOPROL XL) 50 MG 24 hr tablet Take 50 mg by mouth daily.      multivitamin w/minerals (THERA-VIT-M) tablet Take 1 tablet by mouth daily      predniSONE (DELTASONE) 5 MG tablet Take 5 mg by mouth daily      simethicone 80 MG TABS Take 1 tablet by mouth every 6 hours.      spironolactone (ALDACTONE) 25 MG tablet Take 25 mg by mouth daily      torsemide (DEMADEX) 20 MG tablet 80 mg on Mon, Wed, Fri, call for wt gain >250 pounds 48 tablet 3       Allergies Reviewed and Updated:  Allergies   Allergen Reactions    Oxycodone Confusion     Patient had confusion, paranoid, aggression following administration.  Per family this has happened with narcotics in the past as well (details unknown)    Blood-Group Specific Substance Unknown     Patient has a non-specific antibody.  Blood Product orders may be delayed.  Draw one red top and two purple top tubes for ALL Type and Screen/ Type and Crossmatch orders.    Iodinated Contrast Media      RASH    Lisinopril Unknown     Taken off per cardiology    Sulfamethoxazole-Trimethoprim      STOMACH CRAMPS       Review of Systems:  Skin:        Eyes:       ENT:       Respiratory:  Negative for shortness of breath, dyspnea on exertion, cough, wheezing  Cardiovascular:  Negative for, palpitations, chest pain, edema, fatigue, lightheadedness, dizziness    Gastroenterology:      Genitourinary:       Musculoskeletal:       Neurologic:  Negative for  headaches, numbness or tingling of hands, numbness or tingling of feet  Psychiatric:       Heme/Lymph/Imm:       Endocrine:          Pertinent Past Medical, Surgical and Family History Reviewed and noted above.     CC  Nancy Smith MD  [address] [address]

## 2025-04-17 NOTE — LETTER
"2025    [facility]  [address] [facility] [address]    RE: Jayashree Carson       Dear Colleague,     I had the pleasure of seeing Jayashree Carson in the [facility].    Cardiology Clinic Progress Note    Service Date: 2025     Primary Cardiologist: was Dr. mSith      Reason for Visit: afib, hfpef     HPI:   Jayashree Carson is a 74-year-old woman with past medical history significant for the followin.  Permanent atrial fibrillation with Watchman in place  2.  CKD  3.  HFpEF  4.  Moderate TR  5.  Elevated pulmonary pressures with RVSP 49 + RAP on echo   6.  COPD  7.  Wheelchair-bound due to history of CVA with left-sided weakness.    Natasha comes back to clinic today and is frustrated.  She is dealing with constipation as painful bowel movements.  She denies shortness of breath or noticing that her legs are edematous.  She says no one tells her her legs are swollen.  She hates the water pill she thinks is contributing to her constipation and given she is requires assistance her briefs are not changed frequently enough and she states \"sick of sitting in her own urine\".  She ambulates very little.  She also complains of significant pain and not getting her Tylenol when she needs it.    Social History:  She lives at [facility].  Her daughter Morelia comes in today.  Notes there is been a lot of turnover at her facility.  Patient's granddaughter also has cancer who her daughter is supporting as well.  Patient has lived there about 1 year.    Physical Exam:  /70 (BP Location: Right arm, Patient Position: Sitting, Cuff Size: Adult Large)   Pulse 74   Ht 1.6 m (5' 3\")   Wt 111.6 kg (246 lb)   SpO2 95%   BMI 43.58 kg/m     Elderly, frail-appearing woman in no acute distress.  Obvious left-sided weakness and contractures.  Heart is irregularly irregular, I do not appreciate murmur rub or gallop.  Lungs are clear without wheezes " rales or rhonchi.  Right leg without edema, trace edema on the left varicosities  noted.      Data reviewed:  Multiple BMPs multiple phone visits    Assessment and Plan:  Permanent atrial fibrillation with Watchman device in place, rates controlled on EKG today.  She is appropriately on aspirin.      2.  Heart failure with preserved ejection fraction primarily with right-sided symptoms with her being relatively euvolemic today at a weight of 246 pounds at her facility.  She is quite frustrated with the daily diuretic and will switch today to torsemide 80 mg Monday Wednesdays Fridays this will at least give her a few days off and hopefully help with her constipation.  Since her lungs are clear and she has minimal edema at this weight I think it is reasonable for them to call for a weight of 250 or higher.  If her weight hits 250 we will plan on increasing torsemide to 100 mg Monday Wednesday Friday.  Creatinine has been mildly elevated on this regimen, potassium has been normal.  Otherwise we will continue spironolactone for HFpEF, not a good candidate for SGLT2 inhibitor given her incontinence, Entresto would be an option although she has also had issues with hypotension.  As such we will continue briskly with just diuretic.    3.  CKD, creatinine stable.    4.  CVA with left-sided weakness wheelchair-bound.    5.  Constipation, will defer to facility for additional stool softeners if change in diuretic does not help.    5.  Knee and back pain which seems to worsen after sitting in her wheelchair all morning.  I will change her Tylenol to be 500 mg in the morning, 1000 mg at lunch, and continue at 500 mg in the evening.  In addition she will get 500 mg as needed as needed for total of 3000 mg daily.    We'll try to find a happy medium with her diuretic and her other symptoms.  Will follow her relatively closely and see her back in 3 months with a BMP before that visit.  Call sooner with concerns.    Mary Green,  [facility] CLANC[facility] Cardiology     This note was written using Dragon voice recognition system, please excuse any misspelled names, or nonsensical words and call with any questions.      The longitudinal plan of care for the diagnosis(es)/condition(s) as documented were addressed during this visit. Due to the added complexity in care, I will continue to support Jayashree in the subsequent management and with ongoing continuity of care.    Orders this visit:  Orders Placed This Encounter   Procedures     Basic metabolic panel     Follow-Up with Cardiology IVON     Orders Placed This Encounter   Medications     acetaminophen (TYLENOL) 500 MG tablet     Sig: Take 1 tablet (500 mg) by mouth every morning AND 2 tablets (1,000 mg) daily (with lunch) AND 1 tablet (500 mg) every evening.     Dispense:  120 tablet     Refill:  3     torsemide (DEMADEX) 20 MG tablet     Si mg on Mon, Wed, Fri, call for wt gain >250 pounds     Dispense:  48 tablet     Refill:  3     Medications Discontinued During This Encounter   Medication Reason     Baclofen (LIORESAL) 5 MG tablet Therapy completed (No AVS)     cephALEXin (KEFLEX) 500 MG capsule Therapy completed (No AVS)     traMADol (ULTRAM) 50 MG tablet Therapy completed (No AVS)     furosemide (LASIX) 80 MG tablet      acetaminophen (TYLENOL) 500 MG tablet        Encounter Diagnoses   Name Primary?     Chronic right heart failure (H)      Neuropathic pain          Current Medications:  Current Outpatient Medications   Medication Sig Dispense Refill     acetaminophen (TYLENOL) 500 MG tablet Take 1 tablet (500 mg) by mouth every morning AND 2 tablets (1,000 mg) daily (with lunch) AND 1 tablet (500 mg) every evening. 120 tablet 3     acetaminophen (TYLENOL) 500 MG tablet Take 500 mg by mouth 3 times daily as needed for mild pain.       albuterol (PROAIR HFA/PROVENTIL HFA/VENTOLIN HFA) 108 (90 Base) MCG/ACT inhaler Inhale 2 puffs into the lungs every 6 hours as needed for shortness of  breath, wheezing or cough       albuterol (PROVENTIL) (2.5 MG/3ML) 0.083% neb solution Take 2.5 mg by nebulization every 6 hours as needed for shortness of breath, wheezing or cough       aspirin 81 MG EC tablet Take 81 mg by mouth daily       atorvastatin (LIPITOR) 40 MG tablet Take 40 mg by mouth at bedtime.       cetirizine (ZYRTEC) 10 MG tablet Take 10 mg by mouth daily.       digoxin (LANOXIN) 62.5 MCG tablet Take 1 tablet (62.5 mcg) by mouth daily       fluticasone (FLONASE) 50 MCG/ACT nasal spray Spray 1 spray into both nostrils daily       gabapentin (NEURONTIN) 100 MG capsule Take 2 capsules (200 mg) by mouth 3 times daily       hypromellose (ARTIFICIAL TEARS) 0.5 % SOLN ophthalmic solution Place 1 drop into both eyes 4 times daily as needed for dry eyes.       metoprolol succinate ER (TOPROL XL) 50 MG 24 hr tablet Take 50 mg by mouth daily.       multivitamin w/minerals (THERA-VIT-M) tablet Take 1 tablet by mouth daily       predniSONE (DELTASONE) 5 MG tablet Take 5 mg by mouth daily       simethicone 80 MG TABS Take 1 tablet by mouth every 6 hours.       spironolactone (ALDACTONE) 25 MG tablet Take 25 mg by mouth daily       torsemide (DEMADEX) 20 MG tablet 80 mg on Mon, Wed, Fri, call for wt gain >250 pounds 48 tablet 3       Allergies Reviewed and Updated:  Allergies   Allergen Reactions     Oxycodone Confusion     Patient had confusion, paranoid, aggression following administration.  Per family this has happened with narcotics in the past as well (details unknown)     Blood-Group Specific Substance Unknown     Patient has a non-specific antibody.  Blood Product orders may be delayed.  Draw one red top and two purple top tubes for ALL Type and Screen/ Type and Crossmatch orders.     Iodinated Contrast Media      RASH     Lisinopril Unknown     Taken off per cardiology     Sulfamethoxazole-Trimethoprim      STOMACH CRAMPS       Review of Systems:  Skin:        Eyes:       ENT:       Respiratory:   Negative for shortness of breath, dyspnea on exertion, cough, wheezing  Cardiovascular:  Negative for, palpitations, chest pain, edema, fatigue, lightheadedness, dizziness    Gastroenterology:      Genitourinary:       Musculoskeletal:       Neurologic:  Negative for headaches, numbness or tingling of hands, numbness or tingling of feet  Psychiatric:       Heme/Lymph/Imm:       Endocrine:          Pertinent Past Medical, Surgical and Family History Reviewed and noted above.     CC  Nancy Smith MD  [address]  [place] [address]    Thank you for allowing me to participate in the care of your patient.      Sincerely,     Mary Green PA-C     [facility]  cc:   Mary Green PA-C  [address]  [place] [phone]

## 2025-05-12 ENCOUNTER — TELEPHONE (OUTPATIENT)
Dept: CARDIOLOGY | Facility: CLINIC | Age: 75
End: 2025-05-12
Payer: COMMERCIAL

## 2025-05-12 DIAGNOSIS — I50.812 CHRONIC RIGHT HEART FAILURE (H): ICD-10-CM

## 2025-05-12 RX ORDER — TORSEMIDE 20 MG/1
TABLET ORAL
Qty: 60 TABLET | Refills: 3 | Status: SHIPPED | OUTPATIENT
Start: 2025-05-12

## 2025-05-12 NOTE — TELEPHONE ENCOUNTER
Pls increase torsemide to 100 mg M, W, Fri, repeat bmp next week.   Mary Green PA-C 5/12/2025 12:53 PM

## 2025-05-12 NOTE — TELEPHONE ENCOUNTER
Returned call to Kieran. He said patient's weight have been increasing. He said not sure if the weight had jumped up due to yesterdays holiday (Mother's Day)? Will route to AMBER Rossi to see if she would further increase Torsemide 80mg MWF to 100mg MWF.     Weights:  5/9 - 247#  5/10 - 247#  5/11 - 248# (Mother's Day)  5/12 - 251#      LOV note with AMBER Rossi on 4/17/25  Assessment and Plan:  Permanent atrial fibrillation with Watchman device in place, rates controlled on EKG today.  She is appropriately on aspirin.       2.  Heart failure with preserved ejection fraction primarily with right-sided symptoms with her being relatively euvolemic today at a weight of 246 pounds at her facility.  She is quite frustrated with the daily diuretic and will switch today to torsemide 80 mg Monday Wednesdays Fridays this will at least give her a few days off and hopefully help with her constipation.  Since her lungs are clear and she has minimal edema at this weight I think it is reasonable for them to call for a weight of 250 or higher.  If her weight hits 250 we will plan on increasing torsemide to 100 mg Monday Wednesday Friday.  Creatinine has been mildly elevated on this regimen, potassium has been normal.  Otherwise we will continue spironolactone for HFpEF, not a good candidate for SGLT2 inhibitor given her incontinence, Entresto would be an option although she has also had issues with hypotension.  As such we will continue briskly with just diuretic.     3.  CKD, creatinine stable.     4.  CVA with left-sided weakness wheelchair-bound.     5.  Constipation, will defer to facility for additional stool softeners if change in diuretic does not help.     5.  Knee and back pain which seems to worsen after sitting in her wheelchair all morning.  I will change her Tylenol to be 500 mg in the morning, 1000 mg at lunch, and continue at 500 mg in the evening.  In addition she will get 500 mg as needed as needed for total of  3000 mg daily.     We'll try to find a happy medium with her diuretic and her other symptoms.  Will follow her relatively closely and see her back in 3 months with a BMP before that visit.  Call sooner with concerns.     MARION Rossi [facility]       ----- Message from Latisha JOHNS sent at 5/12/2025 12:26 PM CDT -----  Regarding: Please call [facility]  Ms. Alli Bell's nursing facility (nurse Alcaraz) left a VM requesting a call back to discuss her weight. I deleted the VM before realizing she's general [facility] patient. Will you please call them to follow-up? [phone] you!Latisha

## 2025-05-16 ENCOUNTER — PATIENT OUTREACH (OUTPATIENT)
Dept: CARDIOLOGY | Facility: CLINIC | Age: 75
End: 2025-05-16
Payer: COMMERCIAL

## 2025-05-16 NOTE — PROGRESS NOTES
[facility]      Weights:  5/9 - 247 lbs  5/10 - 247 lbs  5/11 - 248 lbs (Mother's Day)  5/12 - 251lbs    5/12/25 Mary More increased torsemide to 100 mg M, W, Fri, repeat bmp next week.     5/16/25 Kieran QUEEN from Natasha's living facility called to report her weight today was 251 lbs. I attempted to call back but no one picked up and no VM was available to gather more information.     Note: I made a second attempt today. Per the nurse Kieran, Natasha is not having breathing difficulties, shortness of breath or Dyspnea on exertion. He reports that Natasha has no increased swelling and that she has been taking the larger dose of Torsemide Mon, Wed and Fri of this week. I will also forward to [place] team for follow up on Monday.     Genny OLSON, RN  12:36 PM   Nurse line [facility] 8am-4pm   [phone]

## 2025-05-18 ENCOUNTER — LAB REQUISITION (OUTPATIENT)
Dept: LAB | Facility: CLINIC | Age: 75
End: 2025-05-18
Payer: COMMERCIAL

## 2025-05-18 DIAGNOSIS — I50.9 HEART FAILURE, UNSPECIFIED (H): ICD-10-CM

## 2025-05-19 LAB
ANION GAP SERPL CALCULATED.3IONS-SCNC: 11 MMOL/L (ref 7–15)
BUN SERPL-MCNC: 35.1 MG/DL (ref 8–23)
CALCIUM SERPL-MCNC: 9.7 MG/DL (ref 8.8–10.4)
CHLORIDE SERPL-SCNC: 106 MMOL/L (ref 98–107)
CREAT SERPL-MCNC: 1.17 MG/DL (ref 0.51–0.95)
EGFRCR SERPLBLD CKD-EPI 2021: 48 ML/MIN/1.73M2
GLUCOSE SERPL-MCNC: 149 MG/DL (ref 70–99)
HCO3 SERPL-SCNC: 24 MMOL/L (ref 22–29)
POTASSIUM SERPL-SCNC: 4.2 MMOL/L (ref 3.4–5.3)
SODIUM SERPL-SCNC: 141 MMOL/L (ref 135–145)

## 2025-05-19 PROCEDURE — 80048 BASIC METABOLIC PNL TOTAL CA: CPT | Mod: ORL | Performed by: FAMILY MEDICINE

## 2025-05-19 PROCEDURE — 36415 COLL VENOUS BLD VENIPUNCTURE: CPT | Mod: ORL | Performed by: FAMILY MEDICINE

## 2025-05-19 PROCEDURE — P9603 ONE-WAY ALLOW PRORATED MILES: HCPCS | Mod: ORL | Performed by: FAMILY MEDICINE

## 2025-05-19 NOTE — PROGRESS NOTES
Labs drawn after increasing Torsemide to 100mg MWF.    Patient's weight was still 251# on Friday 5/16/25 Saturday 5/17 - 247#  Sunday 5/18 - 247#  Today 5/19 -  249#    Will route to AMBER Rossi for further recommendations.       Component      Latest Ref Rng 4/17/2025  9:14 AM 5/19/2025  5:23 AM   Sodium      135 - 145 mmol/L 140  141    Potassium      3.4 - 5.3 mmol/L 4.4  4.2    Chloride      98 - 107 mmol/L 102  106    Carbon Dioxide (CO2)      22 - 29 mmol/L 29  24    Anion Gap      7 - 15 mmol/L 9  11    Urea Nitrogen      8.0 - 23.0 mg/dL 38.0 (H)  35.1 (H)    Creatinine      0.51 - 0.95 mg/dL 1.33 (H)  1.17 (H)    GFR Estimate      >60 mL/min/1.73m2 41 (L)  48 (L)    Calcium      8.8 - 10.4 mg/dL 9.9  9.7    Glucose      70 - 99 mg/dL 104 (H)        Legend:  (H) High  (L) Low

## 2025-06-15 ENCOUNTER — HEALTH MAINTENANCE LETTER (OUTPATIENT)
Age: 75
End: 2025-06-15

## 2025-06-17 ENCOUNTER — LAB REQUISITION (OUTPATIENT)
Dept: LAB | Facility: CLINIC | Age: 75
End: 2025-06-17
Payer: COMMERCIAL

## 2025-06-17 DIAGNOSIS — I50.9 HEART FAILURE, UNSPECIFIED (H): ICD-10-CM

## 2025-06-18 LAB
ANION GAP SERPL CALCULATED.3IONS-SCNC: 15 MMOL/L (ref 7–15)
BUN SERPL-MCNC: 40.7 MG/DL (ref 8–23)
CALCIUM SERPL-MCNC: 9.4 MG/DL (ref 8.8–10.4)
CHLORIDE SERPL-SCNC: 102 MMOL/L (ref 98–107)
CREAT SERPL-MCNC: 1.33 MG/DL (ref 0.51–0.95)
EGFRCR SERPLBLD CKD-EPI 2021: 41 ML/MIN/1.73M2
HCO3 SERPL-SCNC: 23 MMOL/L (ref 22–29)
POTASSIUM SERPL-SCNC: 3.9 MMOL/L (ref 3.4–5.3)
SODIUM SERPL-SCNC: 140 MMOL/L (ref 135–145)

## 2025-06-18 PROCEDURE — 36415 COLL VENOUS BLD VENIPUNCTURE: CPT | Mod: ORL | Performed by: FAMILY MEDICINE

## 2025-06-18 PROCEDURE — 80048 BASIC METABOLIC PNL TOTAL CA: CPT | Mod: ORL | Performed by: FAMILY MEDICINE

## 2025-06-18 PROCEDURE — P9603 ONE-WAY ALLOW PRORATED MILES: HCPCS | Mod: ORL | Performed by: FAMILY MEDICINE

## 2025-06-19 LAB — GLUCOSE SERPL-MCNC: 95 MG/DL (ref 70–99)

## 2025-06-23 ENCOUNTER — TELEPHONE (OUTPATIENT)
Dept: LAB | Facility: CLINIC | Age: 75
End: 2025-06-23
Payer: COMMERCIAL

## 2025-06-23 DIAGNOSIS — I50.812 CHRONIC RIGHT HEART FAILURE (H): ICD-10-CM

## 2025-06-23 NOTE — TELEPHONE ENCOUNTER
1354: VM from Kieran, nurse at [facility]   Weight today 6/23 251 lbs   Yesterday 6/22 250 lbs   6/21 247 lbs   If new orders, please call [phone]    Will call facility tomorrow 6/24 to see if weight continues to trend up or if going back down.

## 2025-06-23 NOTE — TELEPHONE ENCOUNTER
Labs look excellent.    No medication changes, follow for now.    Mary Green PA-C 6/23/2025 10:50 AM

## 2025-06-23 NOTE — TELEPHONE ENCOUNTER
Nursing home nurse left a VM 6/22 stating patient's weight was 250 lbs, up from 247 lbs the previous day 6/21. No new shortness of breath, and with +1 edema.     Patient currently on spironolactone to 50 mg daily and torsemide 100 mg M, W, F    Most recent labs   Component      Latest Ref Rng 6/18/2025  9:46 AM   Sodium      135 - 145 mmol/L 140    Potassium      3.4 - 5.3 mmol/L 3.9    Chloride      98 - 107 mmol/L 102    Carbon Dioxide (CO2)      22 - 29 mmol/L 23    Anion Gap      7 - 15 mmol/L 15    Urea Nitrogen      8.0 - 23.0 mg/dL 40.7 (H)    Creatinine      0.51 - 0.95 mg/dL 1.33 (H)    GFR Estimate      >60 mL/min/1.73m2 41 (L)    Calcium      8.8 - 10.4 mg/dL 9.4       Legend:  (H) High  (L) Low    Will route to AMBER Rossi to review and give further recommendations.

## 2025-06-24 NOTE — TELEPHONE ENCOUNTER
Called Kieran nurse [facility] who cares for patient to get updated weight on patient this morning. Patient is back down one pound. Asked Kieran to call with update tomorrow to make sure patient continues to trend down or stay same.     6/24 250 lbs  6/23 251 lbs   6/22 250 lbs   6/21 247 lbs

## 2025-06-25 NOTE — TELEPHONE ENCOUNTER
Received message from [facility] Miguel regarding patient weight up 2 lb in a day. 250lbs yesterday and 252 today. Will route to Mary More, to review and give further recommendations.     Patient currently on spironolactone to 50 mg daily and torsemide 100 mg M, W, F     6/25 252 lbs  6/24 250 lbs  6/23 251 lbs   6/22 250 lbs   6/21 247 lbs

## 2025-06-25 NOTE — TELEPHONE ENCOUNTER
Ana M QUEEN at [facility] left message regarding message below.    RN also noted pt. Has +2 pitting edema.     Arabella Boogie on 6/25/2025 at 3:32 PM

## 2025-06-26 NOTE — TELEPHONE ENCOUNTER
Please have her take torsemide 100 mg daily Fr, Sat, Sun, Mon this week and reassess next week.  Repeat bmp Wed or thurs next week pls.    Mary Green PA-C 6/26/2025 2:14 PM

## 2025-06-26 NOTE — TELEPHONE ENCOUNTER
Order for repeat BMP placed for next week Tuesday 7/2 or Wednesday 7/3. Faxed AMBER Rossi recommendations for patient to take Torsemide 100mg Friday 6/27, Saturday 6/28, Sunday 6/29, and Monday 6/30 then reassess next week. [facility] can call [place] [facility] at [phone].

## 2025-07-01 ENCOUNTER — LAB REQUISITION (OUTPATIENT)
Dept: LAB | Facility: CLINIC | Age: 75
End: 2025-07-01
Payer: COMMERCIAL

## 2025-07-01 DIAGNOSIS — I50.9 HEART FAILURE, UNSPECIFIED (H): ICD-10-CM

## 2025-07-02 LAB
ANION GAP SERPL CALCULATED.3IONS-SCNC: 14 MMOL/L (ref 7–15)
BUN SERPL-MCNC: 47.4 MG/DL (ref 8–23)
CALCIUM SERPL-MCNC: 10.1 MG/DL (ref 8.8–10.4)
CHLORIDE SERPL-SCNC: 102 MMOL/L (ref 98–107)
CREAT SERPL-MCNC: 1.36 MG/DL (ref 0.51–0.95)
EGFRCR SERPLBLD CKD-EPI 2021: 40 ML/MIN/1.73M2
GLUCOSE SERPL-MCNC: 98 MG/DL (ref 70–99)
HCO3 SERPL-SCNC: 24 MMOL/L (ref 22–29)
POTASSIUM SERPL-SCNC: 4.2 MMOL/L (ref 3.4–5.3)
SODIUM SERPL-SCNC: 140 MMOL/L (ref 135–145)

## 2025-07-02 PROCEDURE — 80048 BASIC METABOLIC PNL TOTAL CA: CPT | Mod: ORL | Performed by: FAMILY MEDICINE

## 2025-07-02 PROCEDURE — 36415 COLL VENOUS BLD VENIPUNCTURE: CPT | Mod: ORL | Performed by: FAMILY MEDICINE

## 2025-07-02 PROCEDURE — P9603 ONE-WAY ALLOW PRORATED MILES: HCPCS | Mod: ORL | Performed by: FAMILY MEDICINE

## 2025-07-07 ENCOUNTER — TELEPHONE (OUTPATIENT)
Dept: CARDIOLOGY | Facility: CLINIC | Age: 75
End: 2025-07-07
Payer: COMMERCIAL

## 2025-07-07 DIAGNOSIS — I50.812 CHRONIC RIGHT HEART FAILURE (H): ICD-10-CM

## 2025-07-07 NOTE — TELEPHONE ENCOUNTER
[facility] -      Nurse Kieran with [facility] at [phone] is calling due pt's weight gain.     7/7 253 lbs  7/6 252 lbs  7/5 251    Will route to  Cardiology Team for further assessment.     Future Appointments   Date Time Provider Department Center   7/23/2025  9:35 AM Norma, Mary Kaiser PA-C [facility]     Shruti Vazquez RN BAN   12:20 PM 7/7/2025    Nurse line M-F 8a-4p: [phone]

## 2025-07-07 NOTE — TELEPHONE ENCOUNTER
7/3/25 per Dr. Kuhn who was covering provider while AMBER Rossi was out of the office last week.     Ran Kuhn MD to Me      7/3/25  7:41 AM  Looks like there is improvement, return to prior regimen with BMP at follow up visit.        Patient should will go back to taking Torsemide 100mg MWF along with continuing Spironolactone 50mg daily. She will have repeat BMP at her next cardiology visit with AMBER Rossi on 7/23/25.          Update from [facility] today regarding patients increased weight gain. Will route to AMBER Rossi to review and give further recommendations.     7/7 253 lbs  7/6 252 lbs  7/5 251 lbs  7/4 251 lbs  7/3 250 lbs  7/2 250 lbs  7/1 249 lbs

## 2025-07-08 RX ORDER — SPIRONOLACTONE 100 MG/1
100 TABLET, FILM COATED ORAL DAILY
Qty: 90 TABLET | Refills: 3 | Status: SHIPPED | OUTPATIENT
Start: 2025-07-08

## 2025-07-08 NOTE — TELEPHONE ENCOUNTER
Pls increase spironolactone to 100 mg daily.  Repeat bmp early next week. Do not need to call with updated weights until bmp is back.    Mary Green PA-C 7/8/2025 9:05 AM

## 2025-07-08 NOTE — TELEPHONE ENCOUNTER
Faxed updated recommendations to [facility] [phone]. Updated Spironolactone dosing from 50mg to 100mg daily and placed BMP order to be drawn early next week.

## 2025-07-09 NOTE — TELEPHONE ENCOUNTER
Received v/m from [facility] nurse Myriam that now patient is on Spironolactone 100mg daily her weight is down from 253# yesterday 7/8/25 to 251# today 7/9/25.     Called Myriam back to let her know we appreciate the update but okay to wait to call with further updates until we have BMP results next week. Myriam verbalized understanding will also leave note for her coworkers as well.

## 2025-07-16 ENCOUNTER — PATIENT OUTREACH (OUTPATIENT)
Dept: CARDIOLOGY | Facility: CLINIC | Age: 75
End: 2025-07-16
Payer: COMMERCIAL

## 2025-07-16 NOTE — PROGRESS NOTES
Update appreciated.     No changes this week, will wait to assess pt in clinic and see bmp prior to that clinic visit.    Mary Green PA-C 7/16/2025 3:57 PM

## 2025-07-16 NOTE — PROGRESS NOTES
[facility]      5/12/25 Increased torsemide to 100 mg MWF    7/8/25 Spironolactone dosing from 50mg to 100mg daily and placed BMP     7/16/25 RN from [facility] called today to report that Jayashree's weight was up again. Lungs are clear, breathing is comfortable, some edema present but not pitting. Vitals 104/68, HR 71, 94% O2 on room air. Assisted living missed the BMP this week and offered to do it on the 18th but they are already going to do one on the 21st in preparation for upcoming OV. Nurse endorses that Jayashree loves going shopping for snacks with her kids and that it could be contributing.     Patient's weight   5/16      251 lbs   5/17      247 lbs  5/18      247 lbs  6/10      251 lbs  6/11      251 lbs  7/15      249 lbs  7/16      253 lbs   7/17      251 lbs    GDMT: Torsemide 100mg Mon/Wed/Fri              Spironolactone 100 daily              Digoxin 62.5mcg daily              Metoprolol Succinate 50mg daily    Future Appointments   Date Time Provider Department Center   7/23/2025  9:35 AM Mary Green PA-C [facility]      Note: I will forward this to Mary Green for her to review and give recommendations.     Update appreciated.     No changes this week, will wait to assess pt in clinic and see bmp prior to that clinic visit.    Mary Green PA-C 7/16/2025 3:57 PM     Genny BYRNESN, RN  11:56 AM   Nurse line [facility] 8am-4pm   [phone]

## 2025-07-20 ENCOUNTER — LAB REQUISITION (OUTPATIENT)
Dept: LAB | Facility: CLINIC | Age: 75
End: 2025-07-20
Payer: COMMERCIAL

## 2025-07-20 DIAGNOSIS — I50.9 HEART FAILURE, UNSPECIFIED (H): ICD-10-CM

## 2025-07-21 LAB
ANION GAP SERPL CALCULATED.3IONS-SCNC: 13 MMOL/L (ref 7–15)
BUN SERPL-MCNC: 36.1 MG/DL (ref 8–23)
CALCIUM SERPL-MCNC: 10 MG/DL (ref 8.8–10.4)
CHLORIDE SERPL-SCNC: 104 MMOL/L (ref 98–107)
CREAT SERPL-MCNC: 1.47 MG/DL (ref 0.51–0.95)
EGFRCR SERPLBLD CKD-EPI 2021: 36 ML/MIN/1.73M2
GLUCOSE SERPL-MCNC: 94 MG/DL (ref 70–99)
HCO3 SERPL-SCNC: 21 MMOL/L (ref 22–29)
NT-PROBNP SERPL-MCNC: 1852 PG/ML (ref 0–624)
POTASSIUM SERPL-SCNC: 5.3 MMOL/L (ref 3.4–5.3)
SODIUM SERPL-SCNC: 138 MMOL/L (ref 135–145)

## 2025-07-21 PROCEDURE — 36415 COLL VENOUS BLD VENIPUNCTURE: CPT | Mod: ORL | Performed by: FAMILY MEDICINE

## 2025-07-21 PROCEDURE — P9603 ONE-WAY ALLOW PRORATED MILES: HCPCS | Mod: ORL | Performed by: FAMILY MEDICINE

## 2025-07-21 PROCEDURE — 80048 BASIC METABOLIC PNL TOTAL CA: CPT | Mod: ORL | Performed by: FAMILY MEDICINE

## 2025-07-21 PROCEDURE — 83880 ASSAY OF NATRIURETIC PEPTIDE: CPT | Mod: ORL | Performed by: FAMILY MEDICINE

## 2025-07-23 ENCOUNTER — TELEPHONE (OUTPATIENT)
Dept: CARDIOLOGY | Facility: CLINIC | Age: 75
End: 2025-07-23

## 2025-07-23 ENCOUNTER — OFFICE VISIT (OUTPATIENT)
Dept: CARDIOLOGY | Facility: CLINIC | Age: 75
End: 2025-07-23
Payer: COMMERCIAL

## 2025-07-23 VITALS
RESPIRATION RATE: 17 BRPM | HEIGHT: 63 IN | OXYGEN SATURATION: 95 % | SYSTOLIC BLOOD PRESSURE: 116 MMHG | HEART RATE: 76 BPM | DIASTOLIC BLOOD PRESSURE: 64 MMHG | BODY MASS INDEX: 43.59 KG/M2

## 2025-07-23 DIAGNOSIS — I50.812 CHRONIC RIGHT HEART FAILURE (H): ICD-10-CM

## 2025-07-23 RX ORDER — SACUBITRIL AND VALSARTAN 24; 26 MG/1; MG/1
0.5 TABLET, FILM COATED ORAL 2 TIMES DAILY
Qty: 90 TABLET | Refills: 3 | Status: SHIPPED | OUTPATIENT
Start: 2025-07-23

## 2025-07-23 RX ORDER — SPIRONOLACTONE 50 MG/1
50 TABLET, FILM COATED ORAL DAILY
Qty: 90 TABLET | Refills: 3 | Status: SHIPPED | OUTPATIENT
Start: 2025-07-23

## 2025-07-23 ASSESSMENT — PAIN SCALES - GENERAL: PAINLEVEL_OUTOF10: NO PAIN (0)

## 2025-07-23 NOTE — LETTER
"2025    [facility] [place] [facility]  [address] [address] [facility] [address]    RE: Jayashree Carson       Dear Colleague,     I had the pleasure of seeing Jayashree Carson in the [facility].    Cardiology Clinic Progress Note    Service Date: 2025     Primary Cardiologist: was Dr. Smith      Reason for Visit: afib, hfpef     HPI:   Jayashree Carson is a 74-year-old woman with past medical history significant for the followin.  Permanent atrial fibrillation with Watchman in place  2.  CKD  3.  HFpEF  4.  Moderate TR  5.  Elevated pulmonary pressures with RVSP 49 + RAP on echo   6.  COPD  7.  Wheelchair-bound due to history of CVA with left-sided weakness.    She comes in for routine follow-up and continues to be frustrated.  We have had multiple phone calls over the last several months of her weights being up and have increased torsemide to daily and eventually increase spironolactone to 100 mg daily daily.  Even with this she says she is edematous.  She is unsure if she is short of breath that she is really immobile.  She does note that her left leg is extra heavy this is her stroke side and the more heavy it is the harder it is for her to do things.  She continues to hate the water pill noting that she is sick of sitting in her own urine.  They have not been ambulating her nor doing range of motion on her stroke side per patient and her daughter.  They are quite frustrated with the care.    Social History:  She lives at [facility].  Her daughter Morelia comes in today.  Notes there is been a lot of turnover at her facility.  Patient's granddaughter also has cancer who her daughter is supporting as well.  Patient has lived there about 1 year.    Physical Exam:  /64 (BP Location: Right arm, Patient Position: Sitting, Cuff Size: Adult Large)   Pulse 76   Resp 17   Ht 1.6 m (5' 2.99\")   SpO2 95%   BMI 43.59 kg/m     Elderly, frail-appearing woman " in no acute distress.  Obvious left-sided weakness and contractures.  Heart is irregularly irregular, I do not appreciate murmur rub or gallop.  Lungs are clear without wheezes rales or rhonchi.  Bilateral edema 1-2+ to mid shin.    Data reviewed:  Multiple BMPs multiple phone visits    Assessment and Plan:  Permanent atrial fibrillation with Watchman device in place, well-controlled, with increasing creatinine going to discontinue digoxin as I do not want her to become bradycardic.  In addition we will minimize her medication burden    2.  Heart failure with preserved ejection fraction primarily with right-sided symptoms with her dry weight being likely to 45-50.  We have sent call weight of 250 and in spite of increasing her spironolactone to 100 mg a day she remains at 252 and 253 in the last several days.  Unfortunately her creatinine is up and her potassium is at 5.3.  Her biggest concern is being on the water pill even though she desperately needs it.  We discussed options of using a Miramontes catheter so she has less incontinence and she vehemently refuses that.  In an effort to decrease her water pill Jacki I am going to add Entresto 12/13 mg twice daily.  We will decrease spironolactone to 50 mg a day to allow some renal function continue her on torsemide 100 mg Monday Wednesday Friday.  I will have my team reach out to them next week and see where she is at she needs a BMP in 1 week.  Not a candidate for SGLT2 inhibitor given her history of sepsis with UTIs, incontinence, and potential for yeast infection excetra.    In addition, I am going to talk to her primary care provider about getting her on as Wegovy or Ozempic as with weight loss she definitely require less water pill.    3.  CKD, baseline creatinine between 1.2 and 1.5.  Stable.    4.  CVA with left-sided weakness, uses wheelchair, can ambulate but patient feels she is not getting enough physical therapy.  Physical therapy ordered again today.    5.   Moderate TR, will reassess with complete echocardiogram in 4 to 6 weeks.    Obesity with BMI 44 contributing to all of the above will discuss with Dr. Garcia the possibility of weight loss medications.    Thank you for allowing me to participate in this patient's care, we will see her back in about 6 weeks with a BMP and BNP before that visit.  She will also get a BMP in 1 week.      Mary Green PA-C  [facility]     This note was written using Dragon voice recognition system, please excuse any misspelled names, or nonsensical words and call with any questions.      45 minutes was spent in counseling coronation of care including discussion with primary care provider.  The longitudinal plan of care for the diagnosis(es)/condition(s) as documented were addressed during this visit. Due to the added complexity in care, I will continue to support Jayashree in the subsequent management and with ongoing continuity of care.    Orders this visit:  Orders Placed This Encounter   Procedures     Basic metabolic panel     Hemoglobin     NT-proBNP     Follow-Up with Cardiology IVON CORE     Echocardiogram Complete     Orders Placed This Encounter   Medications     sacubitril-valsartan (ENTRESTO) 24-26 MG per tablet     Sig: Take 0.5 tablets by mouth 2 times daily.     Dispense:  90 tablet     Refill:  3     spironolactone (ALDACTONE) 50 MG tablet     Sig: Take 1 tablet (50 mg) by mouth daily.     Dispense:  90 tablet     Refill:  3     Medications Discontinued During This Encounter   Medication Reason     digoxin (LANOXIN) 62.5 MCG tablet      spironolactone (ALDACTONE) 100 MG tablet          Encounter Diagnosis   Name Primary?     Chronic right heart failure (H)          Current Medications:  Current Outpatient Medications   Medication Sig Dispense Refill     acetaminophen (TYLENOL) 500 MG tablet Take 1 tablet (500 mg) by mouth every morning AND 2 tablets (1,000 mg) daily (with lunch) AND 1 tablet (500 mg) every  evening. 120 tablet 3     acetaminophen (TYLENOL) 500 MG tablet Take 500 mg by mouth 3 times daily as needed for mild pain.       albuterol (PROAIR HFA/PROVENTIL HFA/VENTOLIN HFA) 108 (90 Base) MCG/ACT inhaler Inhale 2 puffs into the lungs every 6 hours as needed for shortness of breath, wheezing or cough       albuterol (PROVENTIL) (2.5 MG/3ML) 0.083% neb solution Take 2.5 mg by nebulization every 6 hours as needed for shortness of breath, wheezing or cough       aspirin 81 MG EC tablet Take 81 mg by mouth daily       atorvastatin (LIPITOR) 40 MG tablet Take 40 mg by mouth at bedtime.       cetirizine (ZYRTEC) 10 MG tablet Take 10 mg by mouth daily.       fluticasone (FLONASE) 50 MCG/ACT nasal spray Spray 1 spray into both nostrils daily (Patient taking differently: Spray 1 spray into both nostrils as needed.)       gabapentin (NEURONTIN) 100 MG capsule Take 2 capsules (200 mg) by mouth 3 times daily       hypromellose (ARTIFICIAL TEARS) 0.5 % SOLN ophthalmic solution Place 1 drop into both eyes 4 times daily as needed for dry eyes.       metoprolol succinate ER (TOPROL XL) 50 MG 24 hr tablet Take 50 mg by mouth daily.       multivitamin w/minerals (THERA-VIT-M) tablet Take 1 tablet by mouth daily       predniSONE (DELTASONE) 5 MG tablet Take 5 mg by mouth daily       sacubitril-valsartan (ENTRESTO) 24-26 MG per tablet Take 0.5 tablets by mouth 2 times daily. 90 tablet 3     simethicone 80 MG TABS Take 1 tablet by mouth every 6 hours.       spironolactone (ALDACTONE) 50 MG tablet Take 1 tablet (50 mg) by mouth daily. 90 tablet 3     torsemide (DEMADEX) 20 MG tablet 100mg on Mon, Wed, Fri, call for wt gain >250 pounds 60 tablet 3       Allergies Reviewed and Updated:  Allergies   Allergen Reactions     Oxycodone Confusion     Patient had confusion, paranoid, aggression following administration.  Per family this has happened with narcotics in the past as well (details unknown)     Blood-Group Specific Substance  Unknown     Patient has a non-specific antibody.  Blood Product orders may be delayed.  Draw one red top and two purple top tubes for ALL Type and Screen/ Type and Crossmatch orders.     Iodinated Contrast Media      RASH     Lisinopril Unknown     Taken off per cardiology     Sulfamethoxazole-Trimethoprim      STOMACH CRAMPS       Review of Systems:  Skin:        Eyes:       ENT:       Respiratory:  Positive for shortness of breath, dyspnea on exertion  Cardiovascular:  Negative for, palpitations, chest pain, lightheadedness, dizziness, syncope or near-syncope Positive for, edema, fatigue  Gastroenterology:      Genitourinary:       Musculoskeletal:       Neurologic:  Negative for numbness or tingling of feet, numbness or tingling of hands, headaches  Psychiatric:       Heme/Lymph/Imm:       Endocrine:          Pertinent Past Medical, Surgical and Family History Reviewed and noted above.     CC  Nancy Smith MD  [address] [address]    Thank you for allowing me to participate in the care of your patient.      Sincerely,     Mary Green PA-C     [facility]  cc:   Xavier Rachel MD  No address on file

## 2025-07-23 NOTE — PROGRESS NOTES
"  Cardiology Clinic Progress Note    Service Date: 2025     Primary Cardiologist: was Dr. Smith      Reason for Visit: afib, hfpef     HPI:   Jayashree Carson is a 74-year-old woman with past medical history significant for the followin.  Permanent atrial fibrillation with Watchman in place  2.  CKD  3.  HFpEF  4.  Moderate TR  5.  Elevated pulmonary pressures with RVSP 49 + RAP on echo   6.  COPD  7.  Wheelchair-bound due to history of CVA with left-sided weakness.    She comes in for routine follow-up and continues to be frustrated.  We have had multiple phone calls over the last several months of her weights being up and have increased torsemide to daily and eventually increase spironolactone to 100 mg daily daily.  Even with this she says she is edematous.  She is unsure if she is short of breath that she is really immobile.  She does note that her left leg is extra heavy this is her stroke side and the more heavy it is the harder it is for her to do things.  She continues to hate the water pill noting that she is sick of sitting in her own urine.  They have not been ambulating her nor doing range of motion on her stroke side per patient and her daughter.  They are quite frustrated with the care.    Social History:  She lives at [facility].  Her daughter Morelia comes in today.  Notes there is been a lot of turnover at her facility.  Patient's granddaughter also has cancer who her daughter is supporting as well.  Patient has lived there about 1 year.    Physical Exam:  /64 (BP Location: Right arm, Patient Position: Sitting, Cuff Size: Adult Large)   Pulse 76   Resp 17   Ht 1.6 m (5' 2.99\")   SpO2 95%   BMI 43.59 kg/m     Elderly, frail-appearing woman in no acute distress.  Obvious left-sided weakness and contractures.  Heart is irregularly irregular, I do not appreciate murmur rub or gallop.  Lungs are clear without wheezes rales or rhonchi.  Bilateral edema 1-2+ to mid " shin.    Data reviewed:  Multiple BMPs multiple phone visits    Assessment and Plan:  Permanent atrial fibrillation with Watchman device in place, well-controlled, with increasing creatinine going to discontinue digoxin as I do not want her to become bradycardic.  In addition we will minimize her medication burden    2.  Heart failure with preserved ejection fraction primarily with right-sided symptoms with her dry weight being likely to 45-50.  We have sent call weight of 250 and in spite of increasing her spironolactone to 100 mg a day she remains at 252 and 253 in the last several days.  Unfortunately her creatinine is up and her potassium is at 5.3.  Her biggest concern is being on the water pill even though she desperately needs it.  We discussed options of using a Miramontes catheter so she has less incontinence and she vehemently refuses that.  In an effort to decrease her water pill Jacki I am going to add Entresto 12/13 mg twice daily.  We will decrease spironolactone to 50 mg a day to allow some renal function continue her on torsemide 100 mg Monday Wednesday Friday.  I will have my team reach out to them next week and see where she is at she needs a BMP in 1 week.  Not a candidate for SGLT2 inhibitor given her history of sepsis with UTIs, incontinence, and potential for yeast infection excetra.    In addition, I am going to talk to her primary care provider about getting her on as Wegovy or Ozempic as with weight loss she definitely require less water pill.    3.  CKD, baseline creatinine between 1.2 and 1.5.  Stable.    4.  CVA with left-sided weakness, uses wheelchair, can ambulate but patient feels she is not getting enough physical therapy.  Physical therapy ordered again today.    5.  Moderate TR, will reassess with complete echocardiogram in 4 to 6 weeks.    Obesity with BMI 44 contributing to all of the above will discuss with Dr. Garcia the possibility of weight loss medications.    Thank you for  allowing me to participate in this patient's care, we will see her back in about 6 weeks with a BMP and BNP before that visit.  She will also get a BMP in 1 week.      Mary Green PA-C  [facility]     This note was written using Dragon voice recognition system, please excuse any misspelled names, or nonsensical words and call with any questions.      45 minutes was spent in counseling coronation of care including discussion with primary care provider.  The longitudinal plan of care for the diagnosis(es)/condition(s) as documented were addressed during this visit. Due to the added complexity in care, I will continue to support Jayashree in the subsequent management and with ongoing continuity of care.    Orders this visit:  Orders Placed This Encounter   Procedures    Basic metabolic panel    Hemoglobin    NT-proBNP    Follow-Up with Cardiology IVON CORE    Echocardiogram Complete     Orders Placed This Encounter   Medications    sacubitril-valsartan (ENTRESTO) 24-26 MG per tablet     Sig: Take 0.5 tablets by mouth 2 times daily.     Dispense:  90 tablet     Refill:  3    spironolactone (ALDACTONE) 50 MG tablet     Sig: Take 1 tablet (50 mg) by mouth daily.     Dispense:  90 tablet     Refill:  3     Medications Discontinued During This Encounter   Medication Reason    digoxin (LANOXIN) 62.5 MCG tablet     spironolactone (ALDACTONE) 100 MG tablet          Encounter Diagnosis   Name Primary?    Chronic right heart failure (H)          Current Medications:  Current Outpatient Medications   Medication Sig Dispense Refill    acetaminophen (TYLENOL) 500 MG tablet Take 1 tablet (500 mg) by mouth every morning AND 2 tablets (1,000 mg) daily (with lunch) AND 1 tablet (500 mg) every evening. 120 tablet 3    acetaminophen (TYLENOL) 500 MG tablet Take 500 mg by mouth 3 times daily as needed for mild pain.      albuterol (PROAIR HFA/PROVENTIL HFA/VENTOLIN HFA) 108 (90 Base) MCG/ACT inhaler Inhale 2 puffs into the  lungs every 6 hours as needed for shortness of breath, wheezing or cough      albuterol (PROVENTIL) (2.5 MG/3ML) 0.083% neb solution Take 2.5 mg by nebulization every 6 hours as needed for shortness of breath, wheezing or cough      aspirin 81 MG EC tablet Take 81 mg by mouth daily      atorvastatin (LIPITOR) 40 MG tablet Take 40 mg by mouth at bedtime.      cetirizine (ZYRTEC) 10 MG tablet Take 10 mg by mouth daily.      fluticasone (FLONASE) 50 MCG/ACT nasal spray Spray 1 spray into both nostrils daily (Patient taking differently: Spray 1 spray into both nostrils as needed.)      gabapentin (NEURONTIN) 100 MG capsule Take 2 capsules (200 mg) by mouth 3 times daily      hypromellose (ARTIFICIAL TEARS) 0.5 % SOLN ophthalmic solution Place 1 drop into both eyes 4 times daily as needed for dry eyes.      metoprolol succinate ER (TOPROL XL) 50 MG 24 hr tablet Take 50 mg by mouth daily.      multivitamin w/minerals (THERA-VIT-M) tablet Take 1 tablet by mouth daily      predniSONE (DELTASONE) 5 MG tablet Take 5 mg by mouth daily      sacubitril-valsartan (ENTRESTO) 24-26 MG per tablet Take 0.5 tablets by mouth 2 times daily. 90 tablet 3    simethicone 80 MG TABS Take 1 tablet by mouth every 6 hours.      spironolactone (ALDACTONE) 50 MG tablet Take 1 tablet (50 mg) by mouth daily. 90 tablet 3    torsemide (DEMADEX) 20 MG tablet 100mg on Mon, Wed, Fri, call for wt gain >250 pounds 60 tablet 3       Allergies Reviewed and Updated:  Allergies   Allergen Reactions    Oxycodone Confusion     Patient had confusion, paranoid, aggression following administration.  Per family this has happened with narcotics in the past as well (details unknown)    Blood-Group Specific Substance Unknown     Patient has a non-specific antibody.  Blood Product orders may be delayed.  Draw one red top and two purple top tubes for ALL Type and Screen/ Type and Crossmatch orders.    Iodinated Contrast Media      RASH    Lisinopril Unknown     Taken  off per cardiology    Sulfamethoxazole-Trimethoprim      STOMACH CRAMPS       Review of Systems:  Skin:        Eyes:       ENT:       Respiratory:  Positive for shortness of breath, dyspnea on exertion  Cardiovascular:  Negative for, palpitations, chest pain, lightheadedness, dizziness, syncope or near-syncope Positive for, edema, fatigue  Gastroenterology:      Genitourinary:       Musculoskeletal:       Neurologic:  Negative for numbness or tingling of feet, numbness or tingling of hands, headaches  Psychiatric:       Heme/Lymph/Imm:       Endocrine:          Pertinent Past Medical, Surgical and Family History Reviewed and noted above.     CC  Nancy Smith MD  [address] [facility] [address]  [place] [address]

## 2025-07-29 ENCOUNTER — LAB REQUISITION (OUTPATIENT)
Dept: LAB | Facility: CLINIC | Age: 75
End: 2025-07-29
Payer: COMMERCIAL

## 2025-07-29 DIAGNOSIS — I50.9 HEART FAILURE, UNSPECIFIED (H): ICD-10-CM

## 2025-07-30 LAB
ANION GAP SERPL CALCULATED.3IONS-SCNC: 11 MMOL/L (ref 7–15)
BUN SERPL-MCNC: 66.6 MG/DL (ref 8–23)
CALCIUM SERPL-MCNC: 9.2 MG/DL (ref 8.8–10.4)
CHLORIDE SERPL-SCNC: 105 MMOL/L (ref 98–107)
CREAT SERPL-MCNC: 1.58 MG/DL (ref 0.51–0.95)
EGFRCR SERPLBLD CKD-EPI 2021: 33 ML/MIN/1.73M2
GLUCOSE SERPL-MCNC: 91 MG/DL (ref 70–99)
HCO3 SERPL-SCNC: 20 MMOL/L (ref 22–29)
POTASSIUM SERPL-SCNC: 4.9 MMOL/L (ref 3.4–5.3)
SODIUM SERPL-SCNC: 136 MMOL/L (ref 135–145)

## 2025-07-30 PROCEDURE — P9604 ONE-WAY ALLOW PRORATED TRIP: HCPCS | Mod: ORL | Performed by: FAMILY MEDICINE

## 2025-07-30 PROCEDURE — 80048 BASIC METABOLIC PNL TOTAL CA: CPT | Mod: ORL | Performed by: FAMILY MEDICINE

## 2025-07-30 PROCEDURE — 36415 COLL VENOUS BLD VENIPUNCTURE: CPT | Mod: ORL | Performed by: FAMILY MEDICINE

## 2025-08-05 ENCOUNTER — LAB REQUISITION (OUTPATIENT)
Dept: LAB | Facility: CLINIC | Age: 75
End: 2025-08-05
Payer: COMMERCIAL

## 2025-08-05 DIAGNOSIS — I50.9 HEART FAILURE, UNSPECIFIED (H): ICD-10-CM

## 2025-08-06 LAB
ANION GAP SERPL CALCULATED.3IONS-SCNC: 15 MMOL/L (ref 7–15)
BUN SERPL-MCNC: 62.7 MG/DL (ref 8–23)
CALCIUM SERPL-MCNC: 9.1 MG/DL (ref 8.8–10.4)
CHLORIDE SERPL-SCNC: 105 MMOL/L (ref 98–107)
CREAT SERPL-MCNC: 1.65 MG/DL (ref 0.51–0.95)
EGFRCR SERPLBLD CKD-EPI 2021: 32 ML/MIN/1.73M2
GLUCOSE SERPL-MCNC: 88 MG/DL (ref 70–99)
HCO3 SERPL-SCNC: 18 MMOL/L (ref 22–29)
POTASSIUM SERPL-SCNC: 5.3 MMOL/L (ref 3.4–5.3)
SODIUM SERPL-SCNC: 138 MMOL/L (ref 135–145)

## 2025-08-06 PROCEDURE — P9603 ONE-WAY ALLOW PRORATED MILES: HCPCS | Mod: ORL | Performed by: FAMILY MEDICINE

## 2025-08-06 PROCEDURE — 36415 COLL VENOUS BLD VENIPUNCTURE: CPT | Mod: ORL | Performed by: FAMILY MEDICINE

## 2025-08-06 PROCEDURE — 80048 BASIC METABOLIC PNL TOTAL CA: CPT | Mod: ORL | Performed by: FAMILY MEDICINE

## 2025-08-12 ENCOUNTER — LAB REQUISITION (OUTPATIENT)
Dept: LAB | Facility: CLINIC | Age: 75
End: 2025-08-12
Payer: COMMERCIAL

## 2025-08-24 ENCOUNTER — LAB REQUISITION (OUTPATIENT)
Dept: LAB | Facility: CLINIC | Age: 75
End: 2025-08-24
Payer: COMMERCIAL

## 2025-08-24 DIAGNOSIS — R60.9 EDEMA, UNSPECIFIED: ICD-10-CM

## 2025-08-24 DIAGNOSIS — I50.9 HEART FAILURE, UNSPECIFIED (H): ICD-10-CM

## 2025-08-25 LAB
ANION GAP SERPL CALCULATED.3IONS-SCNC: 14 MMOL/L (ref 7–15)
BUN SERPL-MCNC: 57.2 MG/DL (ref 8–23)
CALCIUM SERPL-MCNC: 10.4 MG/DL (ref 8.8–10.4)
CHLORIDE SERPL-SCNC: 105 MMOL/L (ref 98–107)
CREAT SERPL-MCNC: 1.55 MG/DL (ref 0.51–0.95)
EGFRCR SERPLBLD CKD-EPI 2021: 34 ML/MIN/1.73M2
EST. AVERAGE GLUCOSE BLD GHB EST-MCNC: 134 MG/DL
GLUCOSE SERPL-MCNC: 82 MG/DL (ref 70–99)
HBA1C MFR BLD: 6.3 %
HCO3 SERPL-SCNC: 23 MMOL/L (ref 22–29)
POTASSIUM SERPL-SCNC: 3.9 MMOL/L (ref 3.4–5.3)
SODIUM SERPL-SCNC: 142 MMOL/L (ref 135–145)

## 2025-09-02 ENCOUNTER — LAB REQUISITION (OUTPATIENT)
Dept: LAB | Facility: CLINIC | Age: 75
End: 2025-09-02
Payer: COMMERCIAL

## 2025-09-02 DIAGNOSIS — I50.9 HEART FAILURE, UNSPECIFIED (H): ICD-10-CM

## 2025-09-02 DIAGNOSIS — D64.9 ANEMIA, UNSPECIFIED: ICD-10-CM

## 2025-09-03 LAB
ANION GAP SERPL CALCULATED.3IONS-SCNC: 13 MMOL/L (ref 7–15)
BUN SERPL-MCNC: 46.6 MG/DL (ref 8–23)
CALCIUM SERPL-MCNC: 9.8 MG/DL (ref 8.8–10.4)
CHLORIDE SERPL-SCNC: 102 MMOL/L (ref 98–107)
CREAT SERPL-MCNC: 1.54 MG/DL (ref 0.51–0.95)
EGFRCR SERPLBLD CKD-EPI 2021: 34 ML/MIN/1.73M2
GLUCOSE SERPL-MCNC: 90 MG/DL (ref 70–99)
HCO3 SERPL-SCNC: 28 MMOL/L (ref 22–29)
HGB BLD-MCNC: 10.6 G/DL (ref 11.7–15.7)
MCV RBC AUTO: 106.8 FL (ref 78–100)
NT-PROBNP SERPL-MCNC: 3402 PG/ML (ref 0–624)
POTASSIUM SERPL-SCNC: 3.7 MMOL/L (ref 3.4–5.3)
SODIUM SERPL-SCNC: 143 MMOL/L (ref 135–145)